# Patient Record
Sex: FEMALE | Race: ASIAN | NOT HISPANIC OR LATINO | Employment: OTHER | URBAN - METROPOLITAN AREA
[De-identification: names, ages, dates, MRNs, and addresses within clinical notes are randomized per-mention and may not be internally consistent; named-entity substitution may affect disease eponyms.]

---

## 2018-10-23 ENCOUNTER — OFFICE VISIT (OUTPATIENT)
Dept: URGENT CARE | Facility: CLINIC | Age: 43
End: 2018-10-23
Payer: MEDICAID

## 2018-10-23 VITALS
HEIGHT: 63 IN | DIASTOLIC BLOOD PRESSURE: 68 MMHG | OXYGEN SATURATION: 95 % | BODY MASS INDEX: 23.92 KG/M2 | TEMPERATURE: 100.1 F | WEIGHT: 135 LBS | HEART RATE: 107 BPM | RESPIRATION RATE: 16 BRPM | SYSTOLIC BLOOD PRESSURE: 115 MMHG

## 2018-10-23 DIAGNOSIS — N61.0 MASTITIS, LEFT, ACUTE: ICD-10-CM

## 2018-10-23 DIAGNOSIS — T78.40XA ALLERGIC REACTION TO DRUG, INITIAL ENCOUNTER: ICD-10-CM

## 2018-10-23 PROCEDURE — 99204 OFFICE O/P NEW MOD 45 MIN: CPT | Performed by: PHYSICIAN ASSISTANT

## 2018-10-23 RX ORDER — CLINDAMYCIN HYDROCHLORIDE 300 MG/1
300 CAPSULE ORAL 3 TIMES DAILY
COMMUNITY
End: 2018-11-02

## 2018-10-23 RX ORDER — CEPHALEXIN 500 MG/1
500 CAPSULE ORAL 4 TIMES DAILY
Qty: 40 CAP | Refills: 0 | Status: ON HOLD | OUTPATIENT
Start: 2018-10-23 | End: 2018-11-05

## 2018-10-23 ASSESSMENT — ENCOUNTER SYMPTOMS
HEADACHES: 0
PALPITATIONS: 0
SHORTNESS OF BREATH: 0
DIARRHEA: 1
NAUSEA: 0
SORE THROAT: 0
MYALGIAS: 0
COUGH: 0
VOMITING: 0
FEVER: 1
CHILLS: 1

## 2018-10-23 NOTE — PROGRESS NOTES
"Subjective:      Sofia Viramontes is a 43 y.o. female who presents with Medication Reaction (is taking antibiotic for mastitis and stomach flu but is giving her hives x 1 day. Lt breat inlarged and sore. )            Patient is here with complaints of left breast pain x 3 days. Patient started Clindamycin 3 days ago and has developed redness and itching. She denies any throat swelling or shortness of breath associated with the itching. She has low-grade fevers. She is also recovering from the stomach flu which she is gradually improving. She has history of Penicillin allergy and allergy to Sulfa- both of which caused hives in the past. She has no history of anaphylaxis from antibiotics.      History reviewed. No pertinent past medical history.    History reviewed. No pertinent surgical history.    History reviewed. No pertinent family history.    Allergies   Allergen Reactions   • Amoxicillin Hives   • Penicillins Hives   • Septra [Sulfamethoxazole W-Trimethoprim] Hives   • Sulfa Drugs Hives       Medications, Allergies, and current problem list reviewed today in Epic      Review of Systems   Constitutional: Positive for chills and fever. Negative for malaise/fatigue.   HENT: Negative for sore throat.    Respiratory: Negative for cough and shortness of breath.    Cardiovascular: Negative for chest pain, palpitations and leg swelling.   Gastrointestinal: Positive for diarrhea. Negative for nausea and vomiting.   Genitourinary:        Left breast swelling, redness, pain    Musculoskeletal: Negative for myalgias.   Neurological: Negative for headaches.     All other systems reviewed and are negative.          Objective:     /68   Pulse (!) 107   Temp 37.8 °C (100.1 °F)   Resp 16   Ht 1.6 m (5' 3\")   Wt 61.2 kg (135 lb)   SpO2 95%   BMI 23.91 kg/m²      Physical Exam   Constitutional: She is oriented to person, place, and time. She appears well-developed and well-nourished. No distress.   HENT:   Head: " Normocephalic and atraumatic.   Mouth/Throat: Oropharynx is clear and moist.   On oropharyngeal edema. Airway patent   Cardiovascular: Normal rate, regular rhythm and normal heart sounds.  Exam reveals no gallop and no friction rub.    No murmur heard.  Pulmonary/Chest: Effort normal and breath sounds normal. No respiratory distress. She has no wheezes. She has no rales. She exhibits tenderness, edema and swelling. She exhibits no crepitus. Left breast exhibits tenderness. Left breast exhibits no mass, no nipple discharge and no skin change.       Neurological: She is alert and oriented to person, place, and time. No cranial nerve deficit.   Skin: Rash (diffuse erythematous macular rash.) noted.   Psychiatric: She has a normal mood and affect. Her behavior is normal. Judgment and thought content normal.               Assessment/Plan:     1. Mastitis, left, acute  cephALEXin (KEFLEX) 500 MG Cap   2. Allergic reaction to drug, initial encounter         - cephALEXin (KEFLEX) 500 MG Cap; Take 1 Cap by mouth 4 times a day for 10 days.  Dispense: 40 Cap; Refill: 0    Discussed small risk for cross-reactivity reaction associated with Keflex- patient has history of Penicillin allergy. Unfortunately, we have no other options at this time.  Patient willing to try Keflex. Encouraged probiotic use.  WE did discuss Benadryl  Only prn. Limit use to avoid decreased milk supply and CNS depression in toddler.      Differential diagnoses, Supportive care, and indications for immediate follow-up discussed with patient.   Instructed to return to clinic or nearest emergency department for any change in condition, further concerns, or worsening of symptoms.    The patient demonstrated a good understanding and agreed with the treatment plan.    Hien Gonzalez P.A.-C.

## 2018-10-24 ENCOUNTER — OFFICE VISIT (OUTPATIENT)
Dept: INTERNAL MEDICINE | Facility: MEDICAL CENTER | Age: 43
End: 2018-10-24
Payer: MEDICAID

## 2018-10-24 VITALS
HEIGHT: 63 IN | SYSTOLIC BLOOD PRESSURE: 103 MMHG | WEIGHT: 135 LBS | BODY MASS INDEX: 23.92 KG/M2 | HEART RATE: 103 BPM | OXYGEN SATURATION: 95 % | TEMPERATURE: 100 F | DIASTOLIC BLOOD PRESSURE: 71 MMHG

## 2018-10-24 DIAGNOSIS — A08.4 STOMACH FLU: ICD-10-CM

## 2018-10-24 DIAGNOSIS — Z00.00 HEALTHCARE MAINTENANCE: ICD-10-CM

## 2018-10-24 DIAGNOSIS — T36.8X5D: ICD-10-CM

## 2018-10-24 DIAGNOSIS — N61.0 MASTITIS: ICD-10-CM

## 2018-10-24 PROCEDURE — 99204 OFFICE O/P NEW MOD 45 MIN: CPT | Performed by: INTERNAL MEDICINE

## 2018-10-24 RX ORDER — CLINDAMYCIN HYDROCHLORIDE 300 MG/1
300 CAPSULE ORAL 3 TIMES DAILY
COMMUNITY
End: 2018-10-25

## 2018-10-24 ASSESSMENT — PATIENT HEALTH QUESTIONNAIRE - PHQ9: CLINICAL INTERPRETATION OF PHQ2 SCORE: 0

## 2018-10-24 ASSESSMENT — PAIN SCALES - GENERAL: PAINLEVEL: 3=SLIGHT PAIN

## 2018-10-24 NOTE — PROGRESS NOTES
Sofia Graham is a 43 y.o. female who is here to Establish care and is new to the clinic.     CC: Left breast swelling and tenderness, abdominal complaints    HPI:    Patient is a 43-year-old female with no significant past medical history who is here to establish care today.  Patient does have a few complaints today.  First, as her complaint of left breast swelling.  She said it all started on Saturday when her left breast started getting swollen as well as erythematous.  She said that there is also a lot of redness as well as tenderness.  She was having a blocked milk duct at that time and she thinks that is what probably precipitated this.  That night, she says she was awake all night with complaints of nausea and vomiting.  She started taking antibiotics on Sunday.  She started taking clindamycin.  She says she got a lot of itching from the antibiotic.  She is also allergic to a few other antibiotics including penicillins and sulfa drugs.  Patient said that she then started getting a cough, a little bit of a sore throat, abdominal discomfort, and diarrhea.  She does not complain of any blood in her stool or her vomitus.  She is also complaining of vague symptoms of having numbness in her hands.  She did start taking Tylenol, probiotics, naman drinks, as well as Imodium.  She said the Imodium helped a little bit with her symptoms as well.  She then went to urgent care and was given a prescription for Keflex which she has not started taking yet.  She is breast-feeding her daughter but she says her milk supply has diminished and there is really nothing that is coming out of her left breast so she is not feeding well from that breast any longer.  She has not been around anybody who has been sick and has not been out of the city.  She is also complaining of having fever and chills.    She is also complaining of pain behind her head on the right.  She says this occurs on and off but has decreased in  "frequency recently.  We discussed possibly talking about this at the next visit.    She does not have any other past medical history and her surgical history is only significant for .  She does not smoke tobacco, denies any drug use, denied any alcohol use.  She said she had a Pap smear back in 2016 which was normal as well as a mammogram in spring 2016 which was normal.  Her routine labs were done about 5-6 years ago so she would be interested in getting those done again.    No problem-specific Assessment & Plan notes found for this encounter.      She  has no past medical history on file.    There are no active problems to display for this patient.      Allergies:Amoxicillin; Pcn [penicillins]; and Septra [sulfamethoxazole w-trimethoprim]    No current outpatient prescriptions on file.     No current facility-administered medications for this visit.        Social History   Substance Use Topics   • Smoking status: Never Smoker   • Smokeless tobacco: Never Used   • Alcohol use No       Family History   Problem Relation Age of Onset   • Hypertension Mother    • Arthritis Father      Review of Systems:   Pertinent positives as stated in HPI, all others reviewed as negative.    Physical Exam:  Blood pressure 103/71, pulse (!) 103, temperature 37.8 °C (100 °F), temperature source Temporal, height 1.6 m (5' 3\"), weight 61.2 kg (135 lb), SpO2 95 %. Body mass index is 23.91 kg/m².    General Appearance: healthy, alert, no distress, cooperative  Skin: No rashes or lesions.  Head: Normocephalic. No masses appreciated.   Eyes: PERRLA.  Ears: External ears normal.  Nose/Sinuses: Nares normal. Mucosa normal.  Oropharynx:  Oropharynx moist and without lesion. Mild erythema noted.   Neck: Neck supple. No adenopathy.   Lungs: Lungs clear to auscultation bilaterally.  Heart: Tachycardic without murmur, gallop, or rubs.  Breast: Left breast is engorged, erythematous, tender to palpation and warm to touch. No " fluctuant mass felt on examination. No discharge noted. No skin changes noted.   Abdomen: Soft, BS normal. Mild tenderness to palpation in the mid epigastric region. No rebound tenderness, no guarding noted.    Musculoskeletal: Extremities: Upper and lower extremities appear normal. No deformities, edema.   Peripheral Pulses: Pulses: radial=4/4, dorsalis pedis=4/4  Psychiatric: Mood appears normal.     Assessment/Plan:     1. Mastitis  With engorgement of the left breast, tenderness to palpation, erythema, and fever, we will treat this as mastitis.  She was placed on clindamycin earlier but had a reaction to the medication and is now been placed on Keflex.  I recommended for her to start taking the Keflex.  On examination, there is no sensation of an abscess but that is something to consider if her symptoms do not improve.  She does not have any risk factors to cover for MRSA at this point.  Recommended to start taking the Keflex and to call the office in 3 days if there is no improvement in her symptoms.  She has tried warm compresses but they have not helped.  Another thing to consider if her symptoms do not resolve is getting a culture of fluid from her breast and sending it for analysis.  Another thing to worry about if her symptoms do not improve is also breast abscess for which she may need an ultrasound.  We will see how she does and determine further steps from there onwards.  - CBC WITH DIFFERENTIAL; Future    2. Stomach flu  She had symptoms of nausea, vomiting, abdominal pain, and diarrhea before she started the clindamycin.  She may be having a concurrent systemic infection as well which is likely viral in nature.  No signs of concern at this point in relation to that but certainly if her symptoms do not improve, she will need further workup.  I did order basic labs for her to get done.  Recommended to maintain hydration, can take Tylenol for pain.  She has been taking Imodium for the diarrhea.  - CBC  WITH DIFFERENTIAL; Future  - COMP METABOLIC PANEL; Future    3. Healthcare maintenance  Will order routine labs for her.  Pap smear was done in 2016.  Mammogram was also in spring 2016.  I advised her to come back for another visit after this has resolved so that we can talk in more detail about that as well as her other complaints.  - CBC WITH DIFFERENTIAL; Future  - COMP METABOLIC PANEL; Future  - LIPID PROFILE; Future    4. Adverse effect of clindamycin, subsequent encounter  Patient did have intense itching with the clindamycin so she did stop taking that medication.  She does have allergies to penicillin and sulfa drugs.  We will continue to monitor this for now.  No other needs at this time.      Followup: Return in about 4 weeks (around 11/21/2018), or if symptoms worsen or fail to improve.    This note was created using voice recognition software. There may be unintended errors spelling, and grammar or content.

## 2018-10-26 ENCOUNTER — HOSPITAL ENCOUNTER (OUTPATIENT)
Dept: LAB | Facility: MEDICAL CENTER | Age: 43
End: 2018-10-26
Attending: INTERNAL MEDICINE
Payer: MEDICAID

## 2018-10-26 LAB
ALBUMIN SERPL BCP-MCNC: 3.3 G/DL (ref 3.2–4.9)
ALBUMIN/GLOB SERPL: 1 G/DL
ALP SERPL-CCNC: 63 U/L (ref 30–99)
ALT SERPL-CCNC: 16 U/L (ref 2–50)
ANION GAP SERPL CALC-SCNC: 7 MMOL/L (ref 0–11.9)
AST SERPL-CCNC: 11 U/L (ref 12–45)
BASOPHILS # BLD AUTO: 0 % (ref 0–1.8)
BASOPHILS # BLD: 0 K/UL (ref 0–0.12)
BILIRUB SERPL-MCNC: 0.6 MG/DL (ref 0.1–1.5)
BUN SERPL-MCNC: 13 MG/DL (ref 8–22)
CALCIUM SERPL-MCNC: 8.9 MG/DL (ref 8.5–10.5)
CHLORIDE SERPL-SCNC: 102 MMOL/L (ref 96–112)
CHOLEST SERPL-MCNC: 116 MG/DL (ref 100–199)
CO2 SERPL-SCNC: 27 MMOL/L (ref 20–33)
CREAT SERPL-MCNC: 0.76 MG/DL (ref 0.5–1.4)
EOSINOPHIL # BLD AUTO: 0.51 K/UL (ref 0–0.51)
EOSINOPHIL NFR BLD: 7.2 % (ref 0–6.9)
ERYTHROCYTE [DISTWIDTH] IN BLOOD BY AUTOMATED COUNT: 42.7 FL (ref 35.9–50)
FASTING STATUS PATIENT QL REPORTED: NORMAL
GLOBULIN SER CALC-MCNC: 3.2 G/DL (ref 1.9–3.5)
GLUCOSE SERPL-MCNC: 98 MG/DL (ref 65–99)
HCT VFR BLD AUTO: 36.4 % (ref 37–47)
HDLC SERPL-MCNC: 24 MG/DL
HGB BLD-MCNC: 12.1 G/DL (ref 12–16)
LDLC SERPL CALC-MCNC: 59 MG/DL
LYMPHOCYTES # BLD AUTO: 2.24 K/UL (ref 1–4.8)
LYMPHOCYTES NFR BLD: 31.5 % (ref 22–41)
MANUAL DIFF BLD: NORMAL
MCH RBC QN AUTO: 29.6 PG (ref 27–33)
MCHC RBC AUTO-ENTMCNC: 33.2 G/DL (ref 33.6–35)
MCV RBC AUTO: 89 FL (ref 81.4–97.8)
MONOCYTES # BLD AUTO: 0.89 K/UL (ref 0–0.85)
MONOCYTES NFR BLD AUTO: 12.6 % (ref 0–13.4)
MORPHOLOGY BLD-IMP: NORMAL
MYELOCYTES NFR BLD MANUAL: 0.9 %
NEUTROPHILS # BLD AUTO: 3.39 K/UL (ref 2–7.15)
NEUTROPHILS NFR BLD: 44.2 % (ref 44–72)
NEUTS BAND NFR BLD MANUAL: 3.6 % (ref 0–10)
NRBC # BLD AUTO: 0 K/UL
NRBC BLD-RTO: 0 /100 WBC
PLATELET # BLD AUTO: 126 K/UL (ref 164–446)
PLATELET BLD QL SMEAR: NORMAL
PMV BLD AUTO: 11.2 FL (ref 9–12.9)
POTASSIUM SERPL-SCNC: 3.8 MMOL/L (ref 3.6–5.5)
PROT SERPL-MCNC: 6.5 G/DL (ref 6–8.2)
RBC # BLD AUTO: 4.09 M/UL (ref 4.2–5.4)
RBC BLD AUTO: PRESENT
SODIUM SERPL-SCNC: 136 MMOL/L (ref 135–145)
TOXIC GRANULES BLD QL SMEAR: SLIGHT
TRIGL SERPL-MCNC: 165 MG/DL (ref 0–149)
VARIANT LYMPHS BLD QL SMEAR: NORMAL
WBC # BLD AUTO: 7.1 K/UL (ref 4.8–10.8)

## 2018-10-26 PROCEDURE — 85007 BL SMEAR W/DIFF WBC COUNT: CPT

## 2018-10-26 PROCEDURE — 36415 COLL VENOUS BLD VENIPUNCTURE: CPT

## 2018-10-26 PROCEDURE — 80053 COMPREHEN METABOLIC PANEL: CPT

## 2018-10-26 PROCEDURE — 80061 LIPID PANEL: CPT

## 2018-10-26 PROCEDURE — 85027 COMPLETE CBC AUTOMATED: CPT

## 2018-10-29 ENCOUNTER — OFFICE VISIT (OUTPATIENT)
Dept: MEDICAL GROUP | Facility: MEDICAL CENTER | Age: 43
End: 2018-10-29
Attending: NURSE PRACTITIONER
Payer: MEDICAID

## 2018-10-29 VITALS
SYSTOLIC BLOOD PRESSURE: 90 MMHG | WEIGHT: 134 LBS | DIASTOLIC BLOOD PRESSURE: 60 MMHG | TEMPERATURE: 98.3 F | HEART RATE: 78 BPM | RESPIRATION RATE: 16 BRPM | OXYGEN SATURATION: 100 % | HEIGHT: 63 IN | BODY MASS INDEX: 23.74 KG/M2

## 2018-10-29 DIAGNOSIS — N61.0 MASTITIS: ICD-10-CM

## 2018-10-29 DIAGNOSIS — N64.4 BREAST PAIN, LEFT: ICD-10-CM

## 2018-10-29 PROCEDURE — 99212 OFFICE O/P EST SF 10 MIN: CPT | Performed by: NURSE PRACTITIONER

## 2018-10-29 PROCEDURE — 99213 OFFICE O/P EST LOW 20 MIN: CPT | Performed by: NURSE PRACTITIONER

## 2018-10-29 RX ORDER — DOXYCYCLINE 100 MG/1
100 CAPSULE ORAL 2 TIMES DAILY
Qty: 14 CAP | Refills: 0 | Status: SHIPPED | OUTPATIENT
Start: 2018-10-29 | End: 2018-11-13

## 2018-10-29 RX ORDER — CLINDAMYCIN HYDROCHLORIDE 300 MG/1
CAPSULE ORAL
Refills: 0 | COMMUNITY
Start: 2018-10-21 | End: 2018-10-29

## 2018-10-29 RX ORDER — IBUPROFEN 800 MG/1
400 TABLET ORAL EVERY 8 HOURS PRN
Qty: 30 TAB | Refills: 0 | Status: SHIPPED | OUTPATIENT
Start: 2018-10-29 | End: 2018-12-19

## 2018-10-29 RX ORDER — CEPHALEXIN 500 MG/1
CAPSULE ORAL
Refills: 0 | COMMUNITY
Start: 2018-10-23 | End: 2018-11-13

## 2018-10-29 ASSESSMENT — PAIN SCALES - GENERAL: PAINLEVEL: 2=MINIMAL-SLIGHT

## 2018-10-29 ASSESSMENT — PATIENT HEALTH QUESTIONNAIRE - PHQ9: CLINICAL INTERPRETATION OF PHQ2 SCORE: 0

## 2018-10-29 NOTE — ASSESSMENT & PLAN NOTE
Patient reports is red and less swollen and unable to express milk from Left breast. States was to get checked

## 2018-10-29 NOTE — PROGRESS NOTES
"Chief Complaint:   Chief Complaint   Patient presents with   • Breast Problem     mastitis, left        HPI:  Sofia is here today for short appt for \"check up\"    She was just seen as a new patient by [facility] on 10/24/18.  Apparently they have no open appt's to see her today at [facility].    PMH    Allergies to Penicillin, Sulfa, probably Clindamycin.  Breast Feeding     Nev  Report:   No entries    Review of Records:  10/24/18 New Patient appt w [facility], Dr. Vizcarra.  Chief concern was left breast swelling and tenderness, abdominal complaints.  Numbness in hands, sore throat, breastfeeding w no milk flow from left breast.  Started Antibiotic, Clindamycin on 10/21/18 reportedly for probable Mastitis but   Became itchy.  In addition had right head pain.  PMH included , Pap Smear Dec 2016 reportedly normal and mammogram  also normal.  Allergies to Penicillin, Septra, and now Clindamycin---> Dx w Stomach flu, RXN to Clindamycin, Breast Engorgement,  Probable Mastitis and started on Keflex anf f/u by phone in 3 days and labs ordered and office visit in 4 wks. CBC, CMP, Lipid Profile ordered by [facility].      Breast pain, left  Patient reports is red and less swollen and unable to express milk from Left breast. States was to get checked  As above, recently started on Keflex after issue of Itching 'all over' w Clindamycin.  Unable to obtain fluid or breast milk as left breast no milk flow.  Sofia reports less redness to surface of left breast and left swelling and tenderness but still some discomfort  And redness to lower aspect of anterior breast.   Here as [facility] wanted her to have f/u check but no [facility] MD available today.  Pt requesting U/s of breast to r/o abscess. Denies fever or chills. Is no longer breast feeding.   Current antibiotic covers staph but not MRSA although unlikely is possible.  Will have her continue Keflex, add Doxycycline and order u/s per patient request.  She is to call for " "results ( or My Chart and given access info) and f/u w [facility] as discussed.    Patient Active Problem List    Diagnosis Date Noted   • Breast pain, left 10/29/2018       Allergies:Patient has no allergy information on record.    Medicines as of today:  Current Outpatient Prescriptions   Medication Sig Dispense Refill   • cephALEXin (KEFLEX) 500 MG Cap TAKE 1 C PO QID FOR 10 DAYS  0   • ibuprofen (MOTRIN) 800 MG Tab Take 0.5 Tabs by mouth every 8 hours as needed. 30 Tab 0   • doxycycline (MONODOX) 100 MG capsule Take 1 Cap by mouth 2 times a day. 14 Cap 0     No current facility-administered medications for this visit.        Social History   Substance Use Topics   • Smoking status: Never Smoker   • Smokeless tobacco: Never Used   • Alcohol use No       No past medical history on file.    No family history on file.    ROS:  Review of Systems   See HPI Above    Exam:  Blood pressure (!) 90/60, pulse 78, temperature 36.8 °C (98.3 °F), temperature source Temporal, resp. rate 16, height 1.6 m (5' 3\"), weight 60.8 kg (134 lb), last menstrual period 10/23/2018, SpO2 100 %. Body mass index is 23.74 kg/m².    General:  Well nourished, well developed female in NAD  HENT:Head is grossly normal.  Neck: Supple. Trachea is midline.  Pulmonary: Speaks in full sentences easily.  Normal effort. No    Cardiovascular: Regular rate and rhythm.  Chest- Left breast redness to bottom half of anterior breast.  Abdomen-Abdomen is soft  Upper extremities- MAEW  Lower extremities- neg for edema  Neuro- A & O x 4. Speech clear and appropriate.    Current medications, allergies, and problem list reviewed with patient and updated in [facility] today.    Assessment/Plan:  1. Breast pain, left  US-BREAST COMPLETE-LEFT    ibuprofen (MOTRIN) 800 MG Tab   2. Mastitis  US-BREAST COMPLETE-LEFT    ibuprofen (MOTRIN) 800 MG Tab    doxycycline (MONODOX) 100 MG capsule   Call for Results  F/u [facility] PCP as discussed    SEE her PCP in about 3 weeks (around 11/19/2018) " for w [facility] PCP, sooner if worse..

## 2018-10-30 ENCOUNTER — TELEPHONE (OUTPATIENT)
Dept: INTERNAL MEDICINE | Facility: MEDICAL CENTER | Age: 43
End: 2018-10-30

## 2018-10-30 DIAGNOSIS — N61.0 MASTITIS, LEFT, ACUTE: ICD-10-CM

## 2018-10-30 NOTE — PROGRESS NOTES
Please call patient and inform her of the breast ultrasound ordered. The ultrasound is ordered to be urgent and can you schedule the appointment as soon as possible for the patient. Thanks.

## 2018-10-30 NOTE — TELEPHONE ENCOUNTER
I called [facility] [employer] and was able to get patient scheduled for 10/30/18 with check in time at 2pm.  She will be seen at the [address] [place] [address]. I gave tech Dr. Vizcarra personal number so they can call her and give her the STAT results.     Called patient and notified.  She stated she will be there.      I also [facility] and cancelled her appointment for Nov. 1st.

## 2018-10-30 NOTE — TELEPHONE ENCOUNTER
Called [facility] and spoke to Wesley to set an US of breast STAT for pt. An appt was set up for Nov 1 at [facility].    Called pt to inform her that the appointment was set up for 9:00am check-in time for a 9:30am appointment at [facility] [place] [address]. Pt agreed with appt time and I let her know that if she needed to change, she can call [phone] to change appt.

## 2018-10-31 NOTE — TELEPHONE ENCOUNTER
Patient called and stated she had her ultrasound done and they told her there was no abscess, nut it was an infection and they recommend her switching anti biotics.  She also wanted to let you know that her pain is getting worse.  Her pain level is at 6 and she has been having fever and chills again.  She wants to know if we have any suggestions for her.     Please advise.

## 2018-11-01 ENCOUNTER — HOSPITAL ENCOUNTER (OUTPATIENT)
Dept: LAB | Facility: MEDICAL CENTER | Age: 43
End: 2018-11-01
Attending: INTERNAL MEDICINE
Payer: MEDICAID

## 2018-11-01 LAB
BASOPHILS # BLD AUTO: 0.5 % (ref 0–1.8)
BASOPHILS # BLD: 0.08 K/UL (ref 0–0.12)
EOSINOPHIL # BLD AUTO: 0.35 K/UL (ref 0–0.51)
EOSINOPHIL NFR BLD: 2.2 % (ref 0–6.9)
ERYTHROCYTE [DISTWIDTH] IN BLOOD BY AUTOMATED COUNT: 43.8 FL (ref 35.9–50)
HCT VFR BLD AUTO: 35.2 % (ref 37–47)
HGB BLD-MCNC: 11.8 G/DL (ref 12–16)
IMM GRANULOCYTES # BLD AUTO: 0.08 K/UL (ref 0–0.11)
IMM GRANULOCYTES NFR BLD AUTO: 0.5 % (ref 0–0.9)
LYMPHOCYTES # BLD AUTO: 2.67 K/UL (ref 1–4.8)
LYMPHOCYTES NFR BLD: 16.9 % (ref 22–41)
MCH RBC QN AUTO: 31.1 PG (ref 27–33)
MCHC RBC AUTO-ENTMCNC: 33.5 G/DL (ref 33.6–35)
MCV RBC AUTO: 92.6 FL (ref 81.4–97.8)
MONOCYTES # BLD AUTO: 1.3 K/UL (ref 0–0.85)
MONOCYTES NFR BLD AUTO: 8.2 % (ref 0–13.4)
NEUTROPHILS # BLD AUTO: 11.34 K/UL (ref 2–7.15)
NEUTROPHILS NFR BLD: 71.7 % (ref 44–72)
NRBC # BLD AUTO: 0 K/UL
NRBC BLD-RTO: 0 /100 WBC
PLATELET # BLD AUTO: 481 K/UL (ref 164–446)
PMV BLD AUTO: 9.5 FL (ref 9–12.9)
RBC # BLD AUTO: 3.8 M/UL (ref 4.2–5.4)
WBC # BLD AUTO: 15.8 K/UL (ref 4.8–10.8)

## 2018-11-01 PROCEDURE — 36415 COLL VENOUS BLD VENIPUNCTURE: CPT

## 2018-11-01 PROCEDURE — 85025 COMPLETE CBC W/AUTO DIFF WBC: CPT

## 2018-11-01 NOTE — TELEPHONE ENCOUNTER
Patient left a voicemail stating she had an appointment with OBGYN tomorrow morning and she did blood work but she did not know that she needed to be fasting so she will do them tomorrow afternoon.      I called patient back and notified that I would let Dr. Vizcarra know.

## 2018-11-01 NOTE — TELEPHONE ENCOUNTER
Called patient three times without a response.    Called her brother-in-law, Gucci Meyers, and left a message to return call. He returned the call. Advised him that I have not been able to get in touch with the patient. Updated on the results of the Ultrasound. Recommended that patient may need further work up and IV antibiotics because yesterday she stated she was still have a fever, fatigue, and the symptoms of mastitis were the same. He states patient has an appointment with Ob/Gyn tomorrow and that she is doing okay.

## 2018-11-02 ENCOUNTER — HOSPITAL ENCOUNTER (INPATIENT)
Facility: MEDICAL CENTER | Age: 43
LOS: 4 days | DRG: 600 | End: 2018-11-06
Attending: EMERGENCY MEDICINE | Admitting: INTERNAL MEDICINE
Payer: MEDICAID

## 2018-11-02 ENCOUNTER — APPOINTMENT (OUTPATIENT)
Dept: RADIOLOGY | Facility: MEDICAL CENTER | Age: 43
DRG: 600 | End: 2018-11-02
Attending: STUDENT IN AN ORGANIZED HEALTH CARE EDUCATION/TRAINING PROGRAM
Payer: MEDICAID

## 2018-11-02 ENCOUNTER — APPOINTMENT (OUTPATIENT)
Dept: RADIOLOGY | Facility: MEDICAL CENTER | Age: 43
DRG: 600 | End: 2018-11-02
Attending: EMERGENCY MEDICINE
Payer: MEDICAID

## 2018-11-02 PROBLEM — D64.9 NORMOCYTIC ANEMIA: Status: ACTIVE | Noted: 2018-11-02

## 2018-11-02 PROBLEM — D75.839 THROMBOCYTOSIS: Status: ACTIVE | Noted: 2018-11-02

## 2018-11-02 PROBLEM — N61.0 ACUTE MASTITIS OF LEFT BREAST: Status: ACTIVE | Noted: 2018-11-02

## 2018-11-02 PROBLEM — R07.9 CHEST PAIN: Status: ACTIVE | Noted: 2018-11-02

## 2018-11-02 LAB
ALBUMIN SERPL BCP-MCNC: 3.8 G/DL (ref 3.2–4.9)
ALBUMIN/GLOB SERPL: 1 G/DL
ALP SERPL-CCNC: 59 U/L (ref 30–99)
ALT SERPL-CCNC: 11 U/L (ref 2–50)
ANION GAP SERPL CALC-SCNC: 7 MMOL/L (ref 0–11.9)
AST SERPL-CCNC: 14 U/L (ref 12–45)
BASOPHILS # BLD AUTO: 1.2 % (ref 0–1.8)
BASOPHILS # BLD: 0.11 K/UL (ref 0–0.12)
BILIRUB SERPL-MCNC: 0.4 MG/DL (ref 0.1–1.5)
BUN SERPL-MCNC: 14 MG/DL (ref 8–22)
CALCIUM SERPL-MCNC: 9 MG/DL (ref 8.5–10.5)
CHLORIDE SERPL-SCNC: 106 MMOL/L (ref 96–112)
CO2 SERPL-SCNC: 26 MMOL/L (ref 20–33)
CREAT SERPL-MCNC: 0.74 MG/DL (ref 0.5–1.4)
EKG IMPRESSION: NORMAL
EKG IMPRESSION: NORMAL
EOSINOPHIL # BLD AUTO: 0.29 K/UL (ref 0–0.51)
EOSINOPHIL NFR BLD: 3.1 % (ref 0–6.9)
ERYTHROCYTE [DISTWIDTH] IN BLOOD BY AUTOMATED COUNT: 44.2 FL (ref 35.9–50)
GLOBULIN SER CALC-MCNC: 3.7 G/DL (ref 1.9–3.5)
GLUCOSE SERPL-MCNC: 90 MG/DL (ref 65–99)
HCG SERPL QL: NEGATIVE
HCT VFR BLD AUTO: 36.4 % (ref 37–47)
HGB BLD-MCNC: 11.8 G/DL (ref 12–16)
IMM GRANULOCYTES # BLD AUTO: 0.03 K/UL (ref 0–0.11)
IMM GRANULOCYTES NFR BLD AUTO: 0.3 % (ref 0–0.9)
IRON SATN MFR SERPL: 15 % (ref 15–55)
IRON SERPL-MCNC: 47 UG/DL (ref 40–170)
LDH SERPL L TO P-CCNC: 136 U/L (ref 107–266)
LYMPHOCYTES # BLD AUTO: 2.6 K/UL (ref 1–4.8)
LYMPHOCYTES NFR BLD: 28 % (ref 22–41)
MAGNESIUM SERPL-MCNC: 2.2 MG/DL (ref 1.5–2.5)
MCH RBC QN AUTO: 30.3 PG (ref 27–33)
MCHC RBC AUTO-ENTMCNC: 32.4 G/DL (ref 33.6–35)
MCV RBC AUTO: 93.3 FL (ref 81.4–97.8)
MONOCYTES # BLD AUTO: 0.57 K/UL (ref 0–0.85)
MONOCYTES NFR BLD AUTO: 6.1 % (ref 0–13.4)
NEUTROPHILS # BLD AUTO: 5.7 K/UL (ref 2–7.15)
NEUTROPHILS NFR BLD: 61.3 % (ref 44–72)
NRBC # BLD AUTO: 0 K/UL
NRBC BLD-RTO: 0 /100 WBC
PHOSPHATE SERPL-MCNC: 3.6 MG/DL (ref 2.5–4.5)
PLATELET # BLD AUTO: 546 K/UL (ref 164–446)
PMV BLD AUTO: 8.9 FL (ref 9–12.9)
POTASSIUM SERPL-SCNC: 3.9 MMOL/L (ref 3.6–5.5)
PROT SERPL-MCNC: 7.5 G/DL (ref 6–8.2)
RBC # BLD AUTO: 3.9 M/UL (ref 4.2–5.4)
SODIUM SERPL-SCNC: 139 MMOL/L (ref 135–145)
TIBC SERPL-MCNC: 322 UG/DL (ref 250–450)
TROPONIN I SERPL-MCNC: <0.01 NG/ML (ref 0–0.04)
WBC # BLD AUTO: 9.3 K/UL (ref 4.8–10.8)

## 2018-11-02 PROCEDURE — 93005 ELECTROCARDIOGRAM TRACING: CPT | Performed by: STUDENT IN AN ORGANIZED HEALTH CARE EDUCATION/TRAINING PROGRAM

## 2018-11-02 PROCEDURE — 700102 HCHG RX REV CODE 250 W/ 637 OVERRIDE(OP): Performed by: STUDENT IN AN ORGANIZED HEALTH CARE EDUCATION/TRAINING PROGRAM

## 2018-11-02 PROCEDURE — 700117 HCHG RX CONTRAST REV CODE 255: Performed by: STUDENT IN AN ORGANIZED HEALTH CARE EDUCATION/TRAINING PROGRAM

## 2018-11-02 PROCEDURE — 96375 TX/PRO/DX INJ NEW DRUG ADDON: CPT

## 2018-11-02 PROCEDURE — 83735 ASSAY OF MAGNESIUM: CPT

## 2018-11-02 PROCEDURE — 700111 HCHG RX REV CODE 636 W/ 250 OVERRIDE (IP): Performed by: EMERGENCY MEDICINE

## 2018-11-02 PROCEDURE — 700105 HCHG RX REV CODE 258: Performed by: EMERGENCY MEDICINE

## 2018-11-02 PROCEDURE — 84703 CHORIONIC GONADOTROPIN ASSAY: CPT

## 2018-11-02 PROCEDURE — 84484 ASSAY OF TROPONIN QUANT: CPT

## 2018-11-02 PROCEDURE — 93010 ELECTROCARDIOGRAM REPORT: CPT | Mod: 77 | Performed by: INTERNAL MEDICINE

## 2018-11-02 PROCEDURE — 83540 ASSAY OF IRON: CPT

## 2018-11-02 PROCEDURE — 93005 ELECTROCARDIOGRAM TRACING: CPT | Performed by: EMERGENCY MEDICINE

## 2018-11-02 PROCEDURE — 93010 ELECTROCARDIOGRAM REPORT: CPT | Performed by: INTERNAL MEDICINE

## 2018-11-02 PROCEDURE — A9270 NON-COVERED ITEM OR SERVICE: HCPCS | Performed by: STUDENT IN AN ORGANIZED HEALTH CARE EDUCATION/TRAINING PROGRAM

## 2018-11-02 PROCEDURE — 83550 IRON BINDING TEST: CPT

## 2018-11-02 PROCEDURE — 96365 THER/PROPH/DIAG IV INF INIT: CPT

## 2018-11-02 PROCEDURE — 700105 HCHG RX REV CODE 258: Performed by: INTERNAL MEDICINE

## 2018-11-02 PROCEDURE — 85025 COMPLETE CBC W/AUTO DIFF WBC: CPT

## 2018-11-02 PROCEDURE — 71260 CT THORAX DX C+: CPT

## 2018-11-02 PROCEDURE — 770020 HCHG ROOM/CARE - TELE (206)

## 2018-11-02 PROCEDURE — 83615 LACTATE (LD) (LDH) ENZYME: CPT

## 2018-11-02 PROCEDURE — 99222 1ST HOSP IP/OBS MODERATE 55: CPT | Mod: GC | Performed by: INTERNAL MEDICINE

## 2018-11-02 PROCEDURE — 80053 COMPREHEN METABOLIC PANEL: CPT

## 2018-11-02 PROCEDURE — 99285 EMERGENCY DEPT VISIT HI MDM: CPT

## 2018-11-02 PROCEDURE — 96366 THER/PROPH/DIAG IV INF ADDON: CPT

## 2018-11-02 PROCEDURE — 76604 US EXAM CHEST: CPT

## 2018-11-02 PROCEDURE — 700105 HCHG RX REV CODE 258: Performed by: STUDENT IN AN ORGANIZED HEALTH CARE EDUCATION/TRAINING PROGRAM

## 2018-11-02 PROCEDURE — 84100 ASSAY OF PHOSPHORUS: CPT

## 2018-11-02 RX ORDER — IBUPROFEN 600 MG/1
600 TABLET ORAL EVERY 6 HOURS PRN
Status: DISCONTINUED | OUTPATIENT
Start: 2018-11-02 | End: 2018-11-06 | Stop reason: HOSPADM

## 2018-11-02 RX ORDER — OXYCODONE HYDROCHLORIDE 5 MG/1
5 TABLET ORAL EVERY 6 HOURS PRN
Status: DISCONTINUED | OUTPATIENT
Start: 2018-11-02 | End: 2018-11-06 | Stop reason: HOSPADM

## 2018-11-02 RX ORDER — AMOXICILLIN 250 MG
2 CAPSULE ORAL 2 TIMES DAILY
Status: DISCONTINUED | OUTPATIENT
Start: 2018-11-02 | End: 2018-11-06 | Stop reason: HOSPADM

## 2018-11-02 RX ORDER — DIPHENHYDRAMINE HYDROCHLORIDE 50 MG/ML
25 INJECTION INTRAMUSCULAR; INTRAVENOUS ONCE
Status: COMPLETED | OUTPATIENT
Start: 2018-11-02 | End: 2018-11-02

## 2018-11-02 RX ORDER — ACETAMINOPHEN 325 MG/1
650 TABLET ORAL EVERY 6 HOURS PRN
Status: DISCONTINUED | OUTPATIENT
Start: 2018-11-02 | End: 2018-11-06 | Stop reason: HOSPADM

## 2018-11-02 RX ORDER — IBUPROFEN 200 MG
600 TABLET ORAL 2 TIMES DAILY PRN
COMMUNITY
End: 2018-12-19

## 2018-11-02 RX ORDER — BISACODYL 10 MG
10 SUPPOSITORY, RECTAL RECTAL
Status: DISCONTINUED | OUTPATIENT
Start: 2018-11-02 | End: 2018-11-06 | Stop reason: HOSPADM

## 2018-11-02 RX ORDER — DIPHENHYDRAMINE HCL 25 MG
25 TABLET ORAL EVERY 8 HOURS PRN
Status: DISCONTINUED | OUTPATIENT
Start: 2018-11-02 | End: 2018-11-06 | Stop reason: HOSPADM

## 2018-11-02 RX ORDER — POLYETHYLENE GLYCOL 3350 17 G/17G
1 POWDER, FOR SOLUTION ORAL
Status: DISCONTINUED | OUTPATIENT
Start: 2018-11-02 | End: 2018-11-06 | Stop reason: HOSPADM

## 2018-11-02 RX ORDER — SODIUM CHLORIDE 9 MG/ML
INJECTION, SOLUTION INTRAVENOUS CONTINUOUS
Status: DISCONTINUED | OUTPATIENT
Start: 2018-11-02 | End: 2018-11-06 | Stop reason: HOSPADM

## 2018-11-02 RX ORDER — ACETAMINOPHEN 500 MG
1000 TABLET ORAL 2 TIMES DAILY PRN
COMMUNITY
End: 2022-05-13

## 2018-11-02 RX ORDER — SODIUM CHLORIDE 9 MG/ML
2000 INJECTION, SOLUTION INTRAVENOUS ONCE
Status: COMPLETED | OUTPATIENT
Start: 2018-11-02 | End: 2018-11-02

## 2018-11-02 RX ADMIN — IOHEXOL 80 ML: 350 INJECTION, SOLUTION INTRAVENOUS at 21:56

## 2018-11-02 RX ADMIN — VANCOMYCIN HYDROCHLORIDE 1500 MG: 100 INJECTION, POWDER, LYOPHILIZED, FOR SOLUTION INTRAVENOUS at 15:20

## 2018-11-02 RX ADMIN — SODIUM CHLORIDE: 9 INJECTION, SOLUTION INTRAVENOUS at 19:01

## 2018-11-02 RX ADMIN — SODIUM CHLORIDE 2000 ML: 9 INJECTION, SOLUTION INTRAVENOUS at 23:37

## 2018-11-02 RX ADMIN — DIPHENHYDRAMINE HYDROCHLORIDE 25 MG: 50 INJECTION, SOLUTION INTRAMUSCULAR; INTRAVENOUS at 15:10

## 2018-11-02 RX ADMIN — ACETAMINOPHEN 650 MG: 325 TABLET, FILM COATED ORAL at 18:06

## 2018-11-02 ASSESSMENT — COGNITIVE AND FUNCTIONAL STATUS - GENERAL
DAILY ACTIVITIY SCORE: 24
SUGGESTED CMS G CODE MODIFIER DAILY ACTIVITY: CH
SUGGESTED CMS G CODE MODIFIER MOBILITY: CH
MOBILITY SCORE: 24

## 2018-11-02 ASSESSMENT — ENCOUNTER SYMPTOMS
PALPITATIONS: 0
DIZZINESS: 0
HEMOPTYSIS: 0
SPUTUM PRODUCTION: 0
DIARRHEA: 1
ORTHOPNEA: 0
CONSTIPATION: 0
WEAKNESS: 0
CHILLS: 1
SPEECH CHANGE: 0
EYE PAIN: 0
FEVER: 1
TREMORS: 0
BLOOD IN STOOL: 0
HEADACHES: 0
WEIGHT LOSS: 0
NAUSEA: 0
COUGH: 0
MYALGIAS: 0
HEARTBURN: 0
ABDOMINAL PAIN: 0
SHORTNESS OF BREATH: 0
SENSORY CHANGE: 0
FOCAL WEAKNESS: 0
CLAUDICATION: 0
BLURRED VISION: 0
NECK PAIN: 0
VOMITING: 1
BACK PAIN: 0

## 2018-11-02 ASSESSMENT — PATIENT HEALTH QUESTIONNAIRE - PHQ9
1. LITTLE INTEREST OR PLEASURE IN DOING THINGS: NOT AT ALL
2. FEELING DOWN, DEPRESSED, IRRITABLE, OR HOPELESS: NOT AT ALL
SUM OF ALL RESPONSES TO PHQ9 QUESTIONS 1 AND 2: 0

## 2018-11-02 ASSESSMENT — COPD QUESTIONNAIRES
DURING THE PAST 4 WEEKS HOW MUCH DID YOU FEEL SHORT OF BREATH: NONE/LITTLE OF THE TIME
COPD SCREENING SCORE: 0
DO YOU EVER COUGH UP ANY MUCUS OR PHLEGM?: NO/ONLY WITH OCCASIONAL COLDS OR INFECTIONS
HAVE YOU SMOKED AT LEAST 100 CIGARETTES IN YOUR ENTIRE LIFE: NO/DON'T KNOW

## 2018-11-02 ASSESSMENT — PAIN SCALES - GENERAL
PAINLEVEL_OUTOF10: 0
PAINLEVEL_OUTOF10: 4
PAINLEVEL_OUTOF10: 4
PAINLEVEL_OUTOF10: 2

## 2018-11-02 ASSESSMENT — LIFESTYLE VARIABLES
DO YOU DRINK ALCOHOL: NO
EVER_SMOKED: NEVER

## 2018-11-02 NOTE — ED TRIAGE NOTES
Pt ambulated to triage with   Chief Complaint   Patient presents with   • Sent by MD     pt has mastitis and seen by OB, ob reports possible staph infection.     Pt was started on clindamycin and changed to keflex, pt currently taking medication as prescribed.  Pt Informed regarding triage process and verbalized understanding to inform triage tech or RN for any changes in condition. Placed in lobby.

## 2018-11-02 NOTE — SENIOR ADMIT NOTE
"[facility] SENIOR ADMIT NOTE:    Patient ID:   Name:             Sofia Viramontes     YOB: 1975  Age:                 43 y.o.  female   MRN:               [id number]                                                          Chief Complaint:       Left Breast Pain    History of Present Illness:     Mr. Viramontes is a 43 y.o. female with no pertinent past medical history who presents for left breast pain. The pain has been present for approximately 2 days in duration. She states that has noted associated swelling and erythema of her breast. States that these symptoms have worsened over the last few days. She does complain of subjective chills and fever of 101.0 at home. Denies any purulent discharge. Patient was initially evaluated 2 weeks ago and given PO clindamycin.  Patient took this for a few days but then developed itching.  She was then given Keflex which provided some improvement however her symptoms acutely worsened over the last few days.  Patient's daughter is currently about 2 years old.  She was already in the process of weaning off breast-feeding.  Does not plan to breast-feed after this episode.  She is noted to have multiple drug allergies including allergies to penicillin and sulfa.       Active Ambulatory Problems     Diagnosis Date Noted   • No Active Ambulatory Problems     Resolved Ambulatory Problems     Diagnosis Date Noted   • No Resolved Ambulatory Problems     No Additional Past Medical History       PHYSICAL EXAM  Vitals:   Weight/BMI: Body mass index is 24.06 kg/m².  Blood pressure 112/74, pulse 76, temperature 36.4 °C (97.6 °F), temperature source Temporal, resp. rate 18, height 1.6 m (5' 3\"), weight 61.6 kg (135 lb 12.9 oz), last menstrual period 10/29/2018, SpO2 100 %.  Vitals:    11/02/18 1315 11/02/18 1345 11/02/18 1515 11/02/18 1559   BP:       Pulse: 74 71 70 76   Resp: (!) 22 13 17 18   Temp:       TempSrc:       SpO2: 98% 99% 97% 100%   Weight:       Height:     "     Oxygen Therapy:  Pulse Oximetry: 100 %, O2 (LPM): 0, FiO2%: 21 %    Physical Exam   Constitutional: She is oriented to person, place, and time and well-developed, well-nourished, and in no distress.   HENT:   Head: Normocephalic and atraumatic.   Mouth/Throat: No oropharyngeal exudate.   Eyes: Pupils are equal, round, and reactive to light. Conjunctivae are normal. No scleral icterus.   Neck: Normal range of motion. Neck supple. No JVD present. No thyromegaly present.   Cardiovascular: Normal rate, regular rhythm and normal heart sounds.    No murmur heard.  Pulmonary/Chest: Effort normal and breath sounds normal. No respiratory distress. She has no wheezes.   Abdominal: Soft. Bowel sounds are normal. She exhibits no distension. There is no tenderness. There is no rebound.   Musculoskeletal: Normal range of motion. She exhibits no edema.   Neurological: She is alert and oriented to person, place, and time. No cranial nerve deficit.   Skin: Skin is dry. There is erythema.   Patient noted to have erythema of left breast, tender to palpation, positive fluctuance with possible fluid collection   Psychiatric: Affect normal.       Imaging:  US-CHEST   Final Result      1.  No abscess is identified.      2.  Edema in the subcutaneous tissues of the left breast.          ASSESSMENT:  1) left breast mastitis, rule out possible abscess  2) left chest pain  3) normocytic anemia      PLAN:  -Patient presents with 2-week history of left breast pain, fevers, worsening symptoms  -Received outpatient trial of clindamycin and Keflex  -SIRS 1/4, qSOFA 1/3 on admit, no evidence of leukocytosis  -Ultrasound of left breast negative for abscess  -Given patient's presentation of fevers, worsening symptoms we will start patient on empiric antibiotics at this time, she is allergic to penicillins and sulfa, will give empiric vancomycin  -General surgery consulted, appreciate recommendations, n.p.o. until evaluation  -CT scan of chest  with contrast pending to evaluate for abscess  -Iron panel, ferritin pending  -EKG shows some nonspecific, diffuse ST elevation, some concern for pericarditis, will obtain echo, consider treating with ibuprofen if pain does not improve      Baylee Sandhu M.D.

## 2018-11-02 NOTE — ED PROVIDER NOTES
ED Provider Note    CHIEF COMPLAINT  Chief Complaint   Patient presents with   • Sent by MD     pt has mastitis and seen by OB, ob reports possible staph infection.       [facility]  Sofia Viramontes is a 43 y.o. female who presents for evaluation of persistent and worsening left breast pain and redness with swelling.  The patient is currently breast-feeding.  She has a 2-year-old child that is in the process of weaning.  She developed some redness and irritation in her left breast consistent with mastitis.  Shes has already finished a course of Keflex and is now on clindamycin.  She was being managed by her PCP but then was referred to OB/GYN and saw Dr. Hurtado today.  Out of concern for breast abscess.  She did have a ultrasound last week which did not demonstrate an abscess at that time.  She denies high fevers or chills denies pregnancy    REVIEW OF SYSTEMS  See HPI for further details.  No night sweats weight loss numbness tingling weakness rash all other systems are negative.     PAST MEDICAL HISTORY  No past medical history on file.  History of mastitis  FAMILY HISTORY  Noncontributory    SOCIAL HISTORY  Social History     Social History   • Marital status:      Spouse name: N/A   • Number of children: N/A   • Years of education: N/A     Social History Main Topics   • Smoking status: Never Smoker   • Smokeless tobacco: Never Used   • Alcohol use No   • Drug use: No   • Sexual activity: Not on file     Other Topics Concern   • Not on file     Social History Narrative   • No narrative on file   No IV drug    SURGICAL HISTORY  Past Surgical History:   Procedure Laterality Date   • GYN SURGERY             CURRENT MEDICATIONS  Home Medications     Reviewed by Janessa Emmanuel R.N. (Registered Nurse) on 18 at 1035  Med List Status: Complete   Medication Last Dose Status   cephALEXin (KEFLEX) 500 MG Cap 2018 Active   clindamycin (CLEOCIN) 300 MG Cap not taking Active           "      ALLERGIES  Allergies   Allergen Reactions   • Amoxicillin Hives   • Clindamycin Itching   • Penicillins Hives   • Septra [Sulfamethoxazole W-Trimethoprim] Hives   • Sulfa Drugs Hives       PHYSICAL EXAM  VITAL SIGNS: /74   Pulse (!) 103   Temp 36.4 °C (97.6 °F) (Temporal)   Resp 16   Ht 1.6 m (5' 3\")   Wt 61.6 kg (135 lb 12.9 oz)   LMP 10/29/2018   SpO2 (!) 86%   BMI 24.06 kg/m²       Constitutional: Well developed, Well nourished, No acute distress, Non-toxic appearance.   HENT: Normocephalic, Atraumatic, Bilateral external ears normal, Oropharynx moist, No oral exudates, Nose normal.   Eyes: PERRLA, EOMI, Conjunctiva normal, No discharge.   Neck: Normal range of motion, No tenderness, Supple, No stridor.   Cardiovascular: Normal heart rate, Normal rhythm, No murmurs, No rubs, No gallops.   Thorax & Lungs: Normal breath sounds, No respiratory distress, No wheezing, No chest tenderness.  Breast exam: Female nurse Adela was in the room during the exam.  Right breast is normal.  Left breast is notable for significant swelling on the superior aspect above the nipple no nipple retraction or nipple discharge there is erythema, possible fluctuance concerning for possible abscess  Abdomen: Bowel sounds normal, Soft, No tenderness, No masses, No pulsatile masses.   Skin: Warm, Dry, No erythema, No rash.   Back: No tenderness, No CVA tenderness.   Extremities: Intact distal pulses, No edema, No tenderness, No cyanosis, No clubbing.   Neurologic: Alert & oriented x 3, Normal motor function, Normal sensory function, No focal deficits noted.   Psychiatric: Anxious      RADIOLOGY/PROCEDURES  US-CHEST   Final Result      1.  No abscess is identified.      2.  Edema in the subcutaneous tissues of the left breast.        Results for orders placed or performed during the hospital encounter of 11/02/18   CBC WITH DIFFERENTIAL   Result Value Ref Range    WBC 9.3 4.8 - 10.8 K/uL    RBC 3.90 (L) 4.20 - 5.40 M/uL "    Hemoglobin 11.8 (L) 12.0 - 16.0 g/dL    Hematocrit 36.4 (L) 37.0 - 47.0 %    MCV 93.3 81.4 - 97.8 fL    MCH 30.3 27.0 - 33.0 pg    MCHC 32.4 (L) 33.6 - 35.0 g/dL    RDW 44.2 35.9 - 50.0 fL    Platelet Count 546 (H) 164 - 446 K/uL    MPV 8.9 (L) 9.0 - 12.9 fL    Neutrophils-Polys 61.30 44.00 - 72.00 %    Lymphocytes 28.00 22.00 - 41.00 %    Monocytes 6.10 0.00 - 13.40 %    Eosinophils 3.10 0.00 - 6.90 %    Basophils 1.20 0.00 - 1.80 %    Immature Granulocytes 0.30 0.00 - 0.90 %    Nucleated RBC 0.00 /100 WBC    Neutrophils (Absolute) 5.70 2.00 - 7.15 K/uL    Lymphs (Absolute) 2.60 1.00 - 4.80 K/uL    Monos (Absolute) 0.57 0.00 - 0.85 K/uL    Eos (Absolute) 0.29 0.00 - 0.51 K/uL    Baso (Absolute) 0.11 0.00 - 0.12 K/uL    Immature Granulocytes (abs) 0.03 0.00 - 0.11 K/uL    NRBC (Absolute) 0.00 K/uL   COMP METABOLIC PANEL   Result Value Ref Range    Sodium 139 135 - 145 mmol/L    Potassium 3.9 3.6 - 5.5 mmol/L    Chloride 106 96 - 112 mmol/L    Co2 26 20 - 33 mmol/L    Anion Gap 7.0 0.0 - 11.9    Glucose 90 65 - 99 mg/dL    Bun 14 8 - 22 mg/dL    Creatinine 0.74 0.50 - 1.40 mg/dL    Calcium 9.0 8.5 - 10.5 mg/dL    AST(SGOT) 14 12 - 45 U/L    ALT(SGPT) 11 2 - 50 U/L    Alkaline Phosphatase 59 30 - 99 U/L    Total Bilirubin 0.4 0.1 - 1.5 mg/dL    Albumin 3.8 3.2 - 4.9 g/dL    Total Protein 7.5 6.0 - 8.2 g/dL    Globulin 3.7 (H) 1.9 - 3.5 g/dL    A-G Ratio 1.0 g/dL   HCG QUAL SERUM   Result Value Ref Range    Beta-Hcg Qualitative Serum Negative Negative   ESTIMATED GFR   Result Value Ref Range    GFR If African American >60 >60 mL/min/1.73 m 2    GFR If Non African American >60 >60 mL/min/1.73 m 2   EKG   Result Value Ref Range    Report       [facility]    Test Date:  2018  Pt Name:    [facility]                  Department: ER  MRN:        [id number]                      Room:        09  Gender:     Female                       Technician: [id number]  :        1975                    Requested By:FRANCISCO COVINGTON  Order #:    [id number]                    Reading MD:    Measurements  Intervals                                Axis  Rate:       69                           P:  MO:                                      QRS:        50  QRSD:       104                          T:          45  QT:         408  QTc:        437    Interpretive Statements  ACCELERATED JUNCTIONAL ESCAPE RHYTHM  ST ELEVATION SUGGESTS PERICARDITIS  ARTIFACT IN LEAD(S) I,II,aVR,aVL,aVF  No previous ECG available for comparison           COURSE & MEDICAL DECISION MAKING  Pertinent Labs & Imaging studies reviewed. (See chart for details)  Patient presents here with worsening erythema and pain in her left breast.  I was concerned about possible abscess however ultrasound did not demonstrate any obvious abscess.  She is Andrea been on 2 oral antibiotics without any improvement.  She was given IV vancomycin and will be admitted to [facility]    FINAL IMPRESSION  1.  Acute left breast mastitis with cellulitis failed outpatient management      Electronically signed by: Francisco Covington, 11/2/2018 11:21 AM

## 2018-11-02 NOTE — ED NOTES
Pt medicated per MAR.  Admitting MD at bedside and female chaperone at bedside for breast assessment.

## 2018-11-02 NOTE — TELEPHONE ENCOUNTER
Noted. Also, called the patient today and spoke with her. She did see Ob/Gyn this morning who recommended she go the ER for IV antibiotics. Patient is currently in the ER getting further evaluation and treatment.

## 2018-11-02 NOTE — PROGRESS NOTES
"Pharmacy Kinetics 43 y.o. female on vancomycin day # 1 2018    Currently on Vancomycin 1500 mg iv once (25 mg/kg)    Indication for Treatment: SSTI/Mastitis    Pertinent history per medical record: Admitted on 2018 for left mastitis.  Patient is currently breastfeeding and had noted swelling and erythema extending from her L armpit to breast.  She was started on oral antibiotics as an outpatient but had worsening symptoms and admitted to the hospital for IV antibiotics and surgical consultation.  US was negative for abscess formation.    Other antibiotics: None    Allergies: Amoxicillin; Clindamycin; Penicillins; Septra [sulfamethoxazole w-trimethoprim]; and Sulfa drugs     List concerns for renal function : None    Pertinent cultures to date:    - peripheral blood culture - to be collected    Recent Labs      18   1120   WBC  9.3   NEUTSPOLYS  61.30     Recent Labs      18   1120   BUN  14   CREATININE  0.74   ALBUMIN  3.8     No results for input(s): VANCOTROUGH, VANCOPEAK, VANCORANDOM in the last 72 hours.No intake or output data in the 24 hours ending 18 1621   Blood pressure 112/74, pulse 95, temperature 36.4 °C (97.6 °F), temperature source Temporal, resp. rate (!) 22, height 1.6 m (5' 3\"), weight 61.6 kg (135 lb 12.9 oz), last menstrual period 10/29/2018, SpO2 95 %. Temp (24hrs), Av.4 °C (97.6 °F), Min:36.4 °C (97.6 °F), Max:36.4 °C (97.6 °F)      A/P   1. Vancomycin dose change: Vancomycin 1000 mg (17 mg/kg) IV q12h  2. Next vancomycin level: ~2 days (not ordered)  3. Goal trough: 12-16 mcg/mL  4. Comments: No previous vancomycin administrations per chart review.  No concerns for accumulation.  Recommend prompt de-escalation if MRSA can be ruled out.  Pharmacy will continue to monitor.    Norma Leija, PharmD., BCPS      "

## 2018-11-02 NOTE — PROGRESS NOTES
Med rec complete per pt at bedside  Interviewed pt with family at bedside with permission from pt  Allergies reviewed and updated.    Pt stopped Clindamycin 10/24/18 after she started itching . Pt was then prescribed keflex and started 10/24/18 for 10 days.

## 2018-11-03 ENCOUNTER — APPOINTMENT (OUTPATIENT)
Dept: CARDIOLOGY | Facility: MEDICAL CENTER | Age: 43
DRG: 600 | End: 2018-11-03
Attending: STUDENT IN AN ORGANIZED HEALTH CARE EDUCATION/TRAINING PROGRAM
Payer: MEDICAID

## 2018-11-03 LAB
ALBUMIN SERPL BCP-MCNC: 2.9 G/DL (ref 3.2–4.9)
ALBUMIN/GLOB SERPL: 0.9 G/DL
ALP SERPL-CCNC: 49 U/L (ref 30–99)
ALT SERPL-CCNC: 9 U/L (ref 2–50)
ANION GAP SERPL CALC-SCNC: 6 MMOL/L (ref 0–11.9)
AST SERPL-CCNC: 12 U/L (ref 12–45)
BASOPHILS # BLD AUTO: 1.2 % (ref 0–1.8)
BASOPHILS # BLD: 0.1 K/UL (ref 0–0.12)
BILIRUB SERPL-MCNC: 0.2 MG/DL (ref 0.1–1.5)
BUN SERPL-MCNC: 12 MG/DL (ref 8–22)
CALCIUM SERPL-MCNC: 8 MG/DL (ref 8.5–10.5)
CHLORIDE SERPL-SCNC: 111 MMOL/L (ref 96–112)
CO2 SERPL-SCNC: 23 MMOL/L (ref 20–33)
CORTIS SERPL-MCNC: 1.9 UG/DL (ref 0–23)
CREAT SERPL-MCNC: 0.71 MG/DL (ref 0.5–1.4)
EKG IMPRESSION: NORMAL
EOSINOPHIL # BLD AUTO: 0.3 K/UL (ref 0–0.51)
EOSINOPHIL NFR BLD: 3.5 % (ref 0–6.9)
ERYTHROCYTE [DISTWIDTH] IN BLOOD BY AUTOMATED COUNT: 43.4 FL (ref 35.9–50)
FERRITIN SERPL-MCNC: 104.5 NG/ML (ref 10–291)
GLOBULIN SER CALC-MCNC: 3.1 G/DL (ref 1.9–3.5)
GLUCOSE SERPL-MCNC: 99 MG/DL (ref 65–99)
HCT VFR BLD AUTO: 33 % (ref 37–47)
HGB BLD-MCNC: 10.9 G/DL (ref 12–16)
IMM GRANULOCYTES # BLD AUTO: 0.03 K/UL (ref 0–0.11)
IMM GRANULOCYTES NFR BLD AUTO: 0.3 % (ref 0–0.9)
LYMPHOCYTES # BLD AUTO: 2.72 K/UL (ref 1–4.8)
LYMPHOCYTES NFR BLD: 31.7 % (ref 22–41)
MAGNESIUM SERPL-MCNC: 1.9 MG/DL (ref 1.5–2.5)
MCH RBC QN AUTO: 30.4 PG (ref 27–33)
MCHC RBC AUTO-ENTMCNC: 33 G/DL (ref 33.6–35)
MCV RBC AUTO: 92.2 FL (ref 81.4–97.8)
MONOCYTES # BLD AUTO: 0.7 K/UL (ref 0–0.85)
MONOCYTES NFR BLD AUTO: 8.1 % (ref 0–13.4)
NEUTROPHILS # BLD AUTO: 4.74 K/UL (ref 2–7.15)
NEUTROPHILS NFR BLD: 55.2 % (ref 44–72)
NRBC # BLD AUTO: 0 K/UL
NRBC BLD-RTO: 0 /100 WBC
PHOSPHATE SERPL-MCNC: 3.3 MG/DL (ref 2.5–4.5)
PLATELET # BLD AUTO: 510 K/UL (ref 164–446)
PMV BLD AUTO: 9 FL (ref 9–12.9)
POTASSIUM SERPL-SCNC: 4 MMOL/L (ref 3.6–5.5)
PROCALCITONIN SERPL-MCNC: <0.05 NG/ML
PROT SERPL-MCNC: 6 G/DL (ref 6–8.2)
RBC # BLD AUTO: 3.58 M/UL (ref 4.2–5.4)
SODIUM SERPL-SCNC: 140 MMOL/L (ref 135–145)
TSH SERPL DL<=0.005 MIU/L-ACNC: 1.25 UIU/ML (ref 0.38–5.33)
VIT B12 SERPL-MCNC: >1500 PG/ML (ref 211–911)
WBC # BLD AUTO: 8.6 K/UL (ref 4.8–10.8)

## 2018-11-03 PROCEDURE — 99232 SBSQ HOSP IP/OBS MODERATE 35: CPT | Mod: GC | Performed by: INTERNAL MEDICINE

## 2018-11-03 PROCEDURE — 82607 VITAMIN B-12: CPT

## 2018-11-03 PROCEDURE — 85025 COMPLETE CBC W/AUTO DIFF WBC: CPT

## 2018-11-03 PROCEDURE — 700105 HCHG RX REV CODE 258: Performed by: STUDENT IN AN ORGANIZED HEALTH CARE EDUCATION/TRAINING PROGRAM

## 2018-11-03 PROCEDURE — 83735 ASSAY OF MAGNESIUM: CPT

## 2018-11-03 PROCEDURE — 700111 HCHG RX REV CODE 636 W/ 250 OVERRIDE (IP): Performed by: INTERNAL MEDICINE

## 2018-11-03 PROCEDURE — 90471 IMMUNIZATION ADMIN: CPT

## 2018-11-03 PROCEDURE — 82533 TOTAL CORTISOL: CPT

## 2018-11-03 PROCEDURE — 84100 ASSAY OF PHOSPHORUS: CPT

## 2018-11-03 PROCEDURE — 700102 HCHG RX REV CODE 250 W/ 637 OVERRIDE(OP): Performed by: STUDENT IN AN ORGANIZED HEALTH CARE EDUCATION/TRAINING PROGRAM

## 2018-11-03 PROCEDURE — 87040 BLOOD CULTURE FOR BACTERIA: CPT

## 2018-11-03 PROCEDURE — 84145 PROCALCITONIN (PCT): CPT

## 2018-11-03 PROCEDURE — 80053 COMPREHEN METABOLIC PANEL: CPT

## 2018-11-03 PROCEDURE — 700111 HCHG RX REV CODE 636 W/ 250 OVERRIDE (IP): Performed by: STUDENT IN AN ORGANIZED HEALTH CARE EDUCATION/TRAINING PROGRAM

## 2018-11-03 PROCEDURE — 90686 IIV4 VACC NO PRSV 0.5 ML IM: CPT | Performed by: INTERNAL MEDICINE

## 2018-11-03 PROCEDURE — 93306 TTE W/DOPPLER COMPLETE: CPT

## 2018-11-03 PROCEDURE — 82728 ASSAY OF FERRITIN: CPT

## 2018-11-03 PROCEDURE — 700105 HCHG RX REV CODE 258: Performed by: INTERNAL MEDICINE

## 2018-11-03 PROCEDURE — A9270 NON-COVERED ITEM OR SERVICE: HCPCS | Performed by: STUDENT IN AN ORGANIZED HEALTH CARE EDUCATION/TRAINING PROGRAM

## 2018-11-03 PROCEDURE — 93306 TTE W/DOPPLER COMPLETE: CPT | Mod: 26 | Performed by: INTERNAL MEDICINE

## 2018-11-03 PROCEDURE — 770006 HCHG ROOM/CARE - MED/SURG/GYN SEMI*

## 2018-11-03 PROCEDURE — 84443 ASSAY THYROID STIM HORMONE: CPT

## 2018-11-03 PROCEDURE — 36415 COLL VENOUS BLD VENIPUNCTURE: CPT

## 2018-11-03 PROCEDURE — 3E0234Z INTRODUCTION OF SERUM, TOXOID AND VACCINE INTO MUSCLE, PERCUTANEOUS APPROACH: ICD-10-PCS | Performed by: INTERNAL MEDICINE

## 2018-11-03 RX ADMIN — SODIUM CHLORIDE: 9 INJECTION, SOLUTION INTRAVENOUS at 05:09

## 2018-11-03 RX ADMIN — INFLUENZA A VIRUS A/MICHIGAN/45/2015 X-275 (H1N1) ANTIGEN (FORMALDEHYDE INACTIVATED), INFLUENZA A VIRUS A/SINGAPORE/INFIMH-16-0019/2016 IVR-186 (H3N2) ANTIGEN (FORMALDEHYDE INACTIVATED), INFLUENZA B VIRUS B/PHUKET/3073/2013 ANTIGEN (FORMALDEHYDE INACTIVATED), AND INFLUENZA B VIRUS B/MARYLAND/15/2016 BX-69A ANTIGEN (FORMALDEHYDE INACTIVATED) 0.5 ML: 15; 15; 15; 15 INJECTION, SUSPENSION INTRAMUSCULAR at 12:21

## 2018-11-03 RX ADMIN — VANCOMYCIN HYDROCHLORIDE 1000 MG: 100 INJECTION, POWDER, LYOPHILIZED, FOR SOLUTION INTRAVENOUS at 05:09

## 2018-11-03 RX ADMIN — ACETAMINOPHEN 650 MG: 325 TABLET, FILM COATED ORAL at 07:25

## 2018-11-03 RX ADMIN — SODIUM CHLORIDE: 9 INJECTION, SOLUTION INTRAVENOUS at 20:56

## 2018-11-03 RX ADMIN — VANCOMYCIN HYDROCHLORIDE 1000 MG: 100 INJECTION, POWDER, LYOPHILIZED, FOR SOLUTION INTRAVENOUS at 16:11

## 2018-11-03 RX ADMIN — ACETAMINOPHEN 650 MG: 325 TABLET, FILM COATED ORAL at 16:15

## 2018-11-03 RX ADMIN — SODIUM CHLORIDE: 9 INJECTION, SOLUTION INTRAVENOUS at 11:49

## 2018-11-03 ASSESSMENT — ENCOUNTER SYMPTOMS
COUGH: 0
BLURRED VISION: 0
PALPITATIONS: 0
NECK PAIN: 0
EYE PAIN: 0
HEARTBURN: 0
FEVER: 0
HEMOPTYSIS: 0
HEADACHES: 0
DIZZINESS: 0
CHILLS: 0
NAUSEA: 0
MYALGIAS: 0

## 2018-11-03 ASSESSMENT — PAIN SCALES - GENERAL
PAINLEVEL_OUTOF10: 1
PAINLEVEL_OUTOF10: 0
PAINLEVEL_OUTOF10: 4
PAINLEVEL_OUTOF10: 0
PAINLEVEL_OUTOF10: 4
PAINLEVEL_OUTOF10: 0
PAINLEVEL_OUTOF10: 2

## 2018-11-03 NOTE — CARE PLAN
Problem: Safety  Goal: Will remain free from injury  Outcome: PROGRESSING AS EXPECTED  .Patient and their room was assessed for risk factors that contribute to falls.  Patient was educated on using the call light and the light was kept within the patient's reach. Room was kept free of clutter and the patient was assisted to bathroom.  Patient was educated on why bed alarms are on. Bed in lowest position and upper side rails up.         Problem: Infection  Goal: Will remain free from infection  Outcome: PROGRESSING AS EXPECTED  .Patient received verbal  instructions on infection prevention. Hand hygiene implemented for standard precautions against spread of infection.

## 2018-11-03 NOTE — PROGRESS NOTES
Internal Medicine Interval Note  Note Author: Kael Umanzor M.D.     Name Sofia Viramontes       1975   Age/Sex 43 y.o. female   MRN [id number]   Code Status Full     After 5PM or if no immediate response to page, please call for cross-coverage  Attending/Team: Dr. Norman / Malcolm See Patient List for primary contact information  Call [phone] to page    1st Call - Day Intern (R1):   Dr. Umanzor 2nd Call - Day Sr. Resident (R2/R3):   Dr. Sandhu     Reason for interval visit  (Principal Problem)   Likely mastitis    Interval Problem Daily Status Update  (24 hours, problem oriented, brief subjective history, new lab/imaging data pertinent to that problem)   No fevers, chills, or night sweats overnight. She states that the redness and swelling is unchanged but the breast is not as warm as it was. Chest pain has decreased and it is very mild now.    Review of Systems   Constitutional: Negative for chills and fever.   HENT: Negative for congestion and ear pain.    Eyes: Negative for blurred vision and pain.   Respiratory: Negative for cough and hemoptysis.    Cardiovascular: Negative for chest pain and palpitations.   Gastrointestinal: Negative for heartburn and nausea.   Genitourinary: Negative for dysuria and urgency.   Musculoskeletal: Negative for myalgias and neck pain.   Skin: Negative for itching and rash.   Neurological: Negative for dizziness and headaches.     Disposition/Barriers to discharge:   None    Consultants/Specialty  Surgery  PCP: Pcp Pt States None    Quality Measures  Quality-Core Measures   Reviewed items::  Labs reviewed, Medications reviewed, EKG reviewed and Radiology images reviewed  Mercedes catheter::  No Mercedes  DVT prophylaxis - mechanical:  SCDs    Physical Exam       Vitals:    18 2025 18 0000 18 0402 18 0845   BP: 110/73 106/75 102/64 108/68   Pulse: 77 75 80 76   Resp: 16 16 14 17   Temp: 37 °C (98.6 °F) 36.7 °C (98.1 °F) 37.2 °C (99 °F) 37.2  °C (98.9 °F)   TempSrc:       SpO2: 97% 94% 97% 94%   Weight:       Height:         Body mass index is 23.39 kg/m². Weight: 59.9 kg (132 lb 0.9 oz)  Oxygen Therapy:  Pulse Oximetry: 94 %, O2 (LPM): 0, FiO2%: 21 %, O2 Delivery: None (Room Air)    Physical Exam   Constitutional: She is well-developed, well-nourished, and in no distress. No distress.   HENT:   Head: Normocephalic and atraumatic.   Eyes: Conjunctivae are normal. Right eye exhibits no discharge. Left eye exhibits no discharge.   Neck: Normal range of motion. Neck supple. No tracheal deviation present. No thyromegaly present.   Cardiovascular: Normal rate, regular rhythm, normal heart sounds and intact distal pulses.  Exam reveals no gallop and no friction rub.    No murmur heard.  Pulmonary/Chest: Effort normal and breath sounds normal. No respiratory distress. She has no wheezes. She has no rales. She exhibits no tenderness.   Abdominal: Soft. Bowel sounds are normal. She exhibits no distension and no mass. There is no tenderness. There is no rebound and no guarding.   Musculoskeletal: Normal range of motion.   Neurological: She is alert. She exhibits normal muscle tone. Coordination normal.   Skin: Skin is warm. She is not diaphoretic. There is erythema.   Mild improvement and regression of erythema on her left breast, continues to be significantly swollen, tender and erythematous spreading outwards from nipple     Assessment/Plan     Acute mastitis of left breast- (present on admission)   Assessment & Plan    MILD IMPROVEMENT  Started 2 weeks ago. Failed outpatient antibiotic treatment: keflex, clindamycin. Fevers+. Ultrasound shows no abscess.  Most likely acute mastitis less likely inflammatory breast cancer, plugged duct, abscess. Procal WNL. No leukocytosis. CT scan shows no abscess but likely mastoiditis and skin thickening most likely due to cellulitis but inflammatory carcinoma cannot be excluded.  - SIRS 1/4, qSOFA 1/3  - blood cultures  -  vancomycin  - general surgery: no intervention as of now, monitor for improvement  - IVF          Normocytic anemia- (present on admission)   Assessment & Plan    Asymptomatic. Possibly due to blood loss or chronic inflammation. TSH, LDH, Iron studies WNL  - ferritin  - B12  - folate  - reticulocytes        Chest pain- (present on admission)   Assessment & Plan    New onset today. Mild. Worse with laying down. Diffuse non-significant ST elevations ok EKG. Troponin negative.  - ECHO          Thrombocytosis (HCC)- (present on admission)   Assessment & Plan    Likely reactive,CTM

## 2018-11-03 NOTE — ASSESSMENT & PLAN NOTE
New onset today. Mild. Worse with laying down. Diffuse non-significant ST elevations ok EKG. Troponin negative. Echo is WNL.

## 2018-11-03 NOTE — CARE PLAN
Problem: Communication  Goal: The ability to communicate needs accurately and effectively will improve  Outcome: PROGRESSING AS EXPECTED  Pt updated on plan of care, discussed orders and medications    Problem: Safety  Goal: Will remain free from injury  Outcome: PROGRESSING AS EXPECTED  Pt instructed to use call light, fall education provided

## 2018-11-03 NOTE — H&P
Internal Medicine Admitting History and Physical    Note Author: Kael Umanzor M.D.       Name Sofia Viramontes       1975   Age/Sex 43 y.o. female   MRN [id number]   Code Status Full     After 5PM or if no immediate response to page, please call for cross-coverage  Attending/Team: Malcolm See See Patient List for primary contact information  Call [phone] to page    1st Call - Day Intern (R1):   Dr. Umanzor 2nd Call - Day Sr. Resident (R2/R3):   Dr. Sandhu     Chief Complaint:   Breast pain and redness    HPI:  43 year old female with no significant past medical history presents after failed outpatient for suspected left mastitis. She has been breastfeeding her 2 year old and about 1 month ago, her left breast started to become painful. She had similar pains about one year ago for the same child, but this pain resolved. Two weeks ago she noticed a painful nodule in her left arm. She also noted an episode of 2 weeks ago when she had vomiting and diarrhea. Further, the the swelling and erythema started to spread from armpit to the breast. She was initially seen and started on 10/23/18 with keflex. However, she developed a severe rash and she was switched to clindamycin around 10/25/18. She noticed some improvement around 10/30/18, but then symptoms have been progressively worse and she started to have fevers. She was evaluated on 10/31/18 by OBGYN with U/S that did not show an abscess per patient. The symptoms did not improve and she was evaluated again on 2018 and sent to emergency room. She tried other supportive therapies that included cold and warm compresses, tylenol, and ibuprofen. Patient also states that she has been developing today mild non-radiating substernal chest pain that is nonexertional but it is worsened by laying flat. She has a history of grandmother with breast cancer at age of 60.    Review of Systems   Constitutional: Positive for chills and fever. Negative for  malaise/fatigue and weight loss.   HENT: Negative for congestion and ear pain.    Eyes: Negative for blurred vision and pain.   Respiratory: Negative for cough, hemoptysis, sputum production and shortness of breath.    Cardiovascular: Positive for chest pain. Negative for palpitations, orthopnea, claudication and leg swelling.   Gastrointestinal: Positive for diarrhea (2 weeks ago, resolved) and vomiting (2 weeks ago, resolved). Negative for abdominal pain, blood in stool, constipation, heartburn and nausea.   Genitourinary: Negative for dysuria, frequency, hematuria and urgency.   Musculoskeletal: Negative for back pain, myalgias and neck pain.   Skin: Positive for itching (improved) and rash (generalized, due to keflex, but now resolved).   Neurological: Negative for dizziness, tremors, sensory change, speech change, focal weakness, weakness and headaches.     Past Medical History (Chronic medical problem, known complications and current treatment)    History reviewed. No pertinent past medical history.    Past Surgical History:  Past Surgical History:   Procedure Laterality Date   • GYN SURGERY             Current Outpatient Medications:  Home Medications     Reviewed by Fran Craig (Pharmacy Tech) on 18 at 1452  Med List Status: Complete   Medication Last Dose Status   acetaminophen (TYLENOL) 500 MG Tab 2018 Active   cephALEXin (KEFLEX) 500 MG Cap 2018 Active   Diaper Rash Products (DESITIN EX) 10/24/2018 Active   ibuprofen (MOTRIN) 200 MG Tab 2018 Active   multivitamin (THERAGRAN) Tab 2018 Active                Medication Allergy/Sensitivities:  Allergies   Allergen Reactions   • Amoxicillin Hives     RXN=early 20's   • Clindamycin Itching     RXN=10/24/18   • Penicillins Hives     RXN=early 20's   • Septra [Sulfamethoxazole W-Trimethoprim] Hives     RXN=early 20's   • Sulfa Drugs Hives     RXN=early 20's         Family History (mandatory)   History reviewed. No  "pertinent family history.  Grandmother breast cancer at age 60    Social History (mandatory)   Social History     Social History   • Marital status:      Spouse name: N/A   • Number of children: N/A   • Years of education: N/A     Occupational History   • Not on file.     Social History Main Topics   • Smoking status: Never Smoker   • Smokeless tobacco: Never Used   • Alcohol use No   • Drug use: No   • Sexual activity: Not on file     Other Topics Concern   • Not on file     Social History Narrative   • No narrative on file     Living situation: With family  PCP : Pcp Pt States None    Physical Exam     Vitals:    11/02/18 1545 11/02/18 1559 11/02/18 1615 11/02/18 1738   BP:    (!) 92/63   Pulse: 73 76 95 67   Resp: 18 18 (!) 22 20   Temp:    37.8 °C (100.1 °F)   TempSrc:       SpO2: 100% 100% 95% 96%   Weight:    59.9 kg (132 lb 0.9 oz)   Height:    1.6 m (5' 3\")     Body mass index is 23.39 kg/m².  BP (!) 92/63   Pulse 67   Temp 37.8 °C (100.1 °F)   Resp 20   Ht 1.6 m (5' 3\")   Wt 59.9 kg (132 lb 0.9 oz)   LMP 10/29/2018   SpO2 96%   Breastfeeding? Yes   BMI 23.39 kg/m²   O2 therapy: Pulse Oximetry: 96 %, O2 (LPM): 0, FiO2%: 21 %, O2 Delivery: None (Room Air)    Physical Exam   Constitutional: She is well-developed, well-nourished, and in no distress. No distress.   HENT:   Head: Normocephalic and atraumatic.   Eyes: Conjunctivae are normal. Right eye exhibits no discharge.   Neck: Normal range of motion. Neck supple. No tracheal deviation present. No thyromegaly present.   Cardiovascular: Normal rate, regular rhythm, normal heart sounds and intact distal pulses.  Exam reveals no gallop and no friction rub.    No murmur heard.  Pulmonary/Chest: Effort normal and breath sounds normal. No respiratory distress. She has no wheezes. She has no rales. She exhibits no tenderness.   Abdominal: Soft. Bowel sounds are normal. She exhibits no distension and no mass. There is no tenderness. There is no " rebound and no guarding.   Musculoskeletal: Normal range of motion. She exhibits edema (left breast).   Neurological: She is alert. She exhibits normal muscle tone. Coordination normal.   Skin: Skin is warm. No rash noted. She is not diaphoretic.        Left breast much larger than right  Left breast erythematous, warm to touch, tender to palpation, there is a soft small that can be palpated laterally  No left axilla lymphadenopathy   Psychiatric: Affect and judgment normal.         Data Review       Old Records Request:   Completed  Current Records review/summary: Completed    Lab Data Review:  Recent Results (from the past 24 hour(s))   CBC WITH DIFFERENTIAL    Collection Time: 11/02/18 11:20 AM   Result Value Ref Range    WBC 9.3 4.8 - 10.8 K/uL    RBC 3.90 (L) 4.20 - 5.40 M/uL    Hemoglobin 11.8 (L) 12.0 - 16.0 g/dL    Hematocrit 36.4 (L) 37.0 - 47.0 %    MCV 93.3 81.4 - 97.8 fL    MCH 30.3 27.0 - 33.0 pg    MCHC 32.4 (L) 33.6 - 35.0 g/dL    RDW 44.2 35.9 - 50.0 fL    Platelet Count 546 (H) 164 - 446 K/uL    MPV 8.9 (L) 9.0 - 12.9 fL    Neutrophils-Polys 61.30 44.00 - 72.00 %    Lymphocytes 28.00 22.00 - 41.00 %    Monocytes 6.10 0.00 - 13.40 %    Eosinophils 3.10 0.00 - 6.90 %    Basophils 1.20 0.00 - 1.80 %    Immature Granulocytes 0.30 0.00 - 0.90 %    Nucleated RBC 0.00 /100 WBC    Neutrophils (Absolute) 5.70 2.00 - 7.15 K/uL    Lymphs (Absolute) 2.60 1.00 - 4.80 K/uL    Monos (Absolute) 0.57 0.00 - 0.85 K/uL    Eos (Absolute) 0.29 0.00 - 0.51 K/uL    Baso (Absolute) 0.11 0.00 - 0.12 K/uL    Immature Granulocytes (abs) 0.03 0.00 - 0.11 K/uL    NRBC (Absolute) 0.00 K/uL   COMP METABOLIC PANEL    Collection Time: 11/02/18 11:20 AM   Result Value Ref Range    Sodium 139 135 - 145 mmol/L    Potassium 3.9 3.6 - 5.5 mmol/L    Chloride 106 96 - 112 mmol/L    Co2 26 20 - 33 mmol/L    Anion Gap 7.0 0.0 - 11.9    Glucose 90 65 - 99 mg/dL    Bun 14 8 - 22 mg/dL    Creatinine 0.74 0.50 - 1.40 mg/dL    Calcium 9.0 8.5 -  10.5 mg/dL    AST(SGOT) 14 12 - 45 U/L    ALT(SGPT) 11 2 - 50 U/L    Alkaline Phosphatase 59 30 - 99 U/L    Total Bilirubin 0.4 0.1 - 1.5 mg/dL    Albumin 3.8 3.2 - 4.9 g/dL    Total Protein 7.5 6.0 - 8.2 g/dL    Globulin 3.7 (H) 1.9 - 3.5 g/dL    A-G Ratio 1.0 g/dL   HCG QUAL SERUM    Collection Time: 18 11:20 AM   Result Value Ref Range    Beta-Hcg Qualitative Serum Negative Negative   ESTIMATED GFR    Collection Time: 18 11:20 AM   Result Value Ref Range    GFR If African American >60 >60 mL/min/1.73 m 2    GFR If Non African American >60 >60 mL/min/1.73 m 2   EKG    Collection Time: 18 12:14 PM   Result Value Ref Range    Report       [facility] Emergency Dept.    Test Date:  2018  Pt Name:    [facility]                  Department: ER  MRN:        [id number]                      Room:       RD 09  Gender:     Female                       Technician: [id number]  :        1975                   Requested By:JELLY MATSON  Order #:    [id number]                    Reading MD:    Measurements  Intervals                                Axis  Rate:       69                           P:  ME:                                      QRS:        50  QRSD:       104                          T:          45  QT:         408  QTc:        437    Interpretive Statements  ACCELERATED JUNCTIONAL ESCAPE RHYTHM  ST ELEVATION SUGGESTS PERICARDITIS  ARTIFACT IN LEAD(S) I,II,aVR,aVL,aVF  No previous ECG available for comparison     EKG    Collection Time: 18  5:52 PM   Result Value Ref Range    Report       [facility]    Test Date:  2018  Pt Name:    [facility]                  Department: ER  MRN:        [id number]                      Room:       T835  Gender:     Female                       Technician: [id number]  :        1975                   Requested By:ANGELICA DAMICO  Order #:    [id number]                    Reading MD: Wesley Hand  MD    Measurements  Intervals                                Axis  Rate:       75                           P:          49  FL:         133                          QRS:        45  QRSD:       86                           T:          45  QT:         382  QTc:        427    Interpretive Statements  SINUS RHYTHM      Electronically Signed On 11-2-2018 18:00:39 PDT by Wesley Hand MD         Imaging/Procedures Review:    Independant Imaging Review: Completed  US-CHEST   Final Result      1.  No abscess is identified.      2.  Edema in the subcutaneous tissues of the left breast.      EC-ECHOCARDIOGRAM COMPLETE W/ CONT    (Results Pending)   CT-CHEST (THORAX) WITH    (Results Pending)      EKG:   EKG Independant Review: Completed  QTc:437, HR: 69, Normal Sinus Rhythm,  ST elevated, non significant height, multiple leads  Poor quality EKG    Records reviewed and summarized in current documentation :  Yes  [employer] teaching service handout given to patient:  Yes       Assessment/Plan     Acute mastitis of left breast   Assessment & Plan    2 weeks. Failed outpatient antibiotic treatment: keflex, clindamycin. Fevers+. Ultrasound shows no abscess.  Most likely acute mastitis less likely inflammatory breast cancer, plugged duct, abscess  - SIRS 1/4, qSOFA 1/3  - no leukocytosis  - blood cultures  - vancomycin  - general surgery consult  - CT scan          Normocytic anemia   Assessment & Plan    Asymptomatic  - iron studies  - ferritin  - LDH  - haptoglobin  - TSH  - B12  - folate  - reticulocytes        Chest pain   Assessment & Plan    New onset today. Mild. Worse with laying down. Diffuse non-significant ST elevations ok EKG.   - troponin & EKG x2 q6h  - telemetry  - we will consider ibuprofen treatment if chest pain unresolved  - ECHO              Anticipated Hospital stay:  >2 midnights    Quality Measures  Quality-Core Measures   Reviewed items::  EKG reviewed, Medications reviewed, Labs reviewed and Radiology images  reviewed  Mercedes catheter::  No Mercedes    PCP: Pcp Pt States None

## 2018-11-03 NOTE — PROGRESS NOTES
DATE OF SERVICE:  11/03/2018    SUBJECTIVE:  No acute changes overnight.  The patient has no new complaints   this morning.  She has been started on vancomycin infusion for mastitis.  The   CT scan showed no evidence of abscess.  She is undergoing an echocardiogram   right now.    OBJECTIVE:  VITAL SIGNS:  Afebrile with stable vitals.    EXAM:  Exam deferred as she is undergoing an echocardiogram at this point in   time    Of note, the patient does not report any changes since I evaluated her last   night.    ASSESSMENT AND PLAN:  Left-sided mastitis resistant to oral antibiotics.  We   will manage the patient with IV antibiotics and monitor her progress.  If she   does not improve with that she can be reimaged for abscess or potentially   require biopsy to rule out other causes of the cellulitis such as a possible   inflammatory malignancy.       ____________________________________     MD JORGE SerratoJ / NTS    DD:  11/03/2018 10:09:25  DT:  11/03/2018 10:35:38    D#:  [id number]  Job#:  [id number]

## 2018-11-03 NOTE — CONSULTS
General Surgery Consult    Date of Service: 2018    Consulting Physician: Mikey Louie M.D. [facility]    -------------------------------------------------------------------------------------------------    Chief complaint: left breast mastitis    HPI: This is a 43 y.o. female with a persistent worsening mastitis of the left breast which is been present for about 2 weeks.  She was started on oral outpatient antibiotics but has not noticed any improvement.  She was nursing her 2-year-old daughter up until the onset of the mastitis but once it set in she was not able to nurse anymore.  An ultrasound has been done showing no abscess.  She is being admitted to the hospital for IV antibiotic therapy.  Her family history is positive for both grandmothers having breast cancer.  Her mother does not have breast cancer  Patient had a baseline mammogram about 3 years ago and there were no concerning findings per the patient's report.    Past medical history:  No significant past medical history    Past Surgical History:   Procedure Laterality Date   • GYN SURGERY             Current Facility-Administered Medications   Medication Dose Route Frequency Provider Last Rate Last Dose   • senna-docusate (PERICOLACE or SENOKOT S) 8.6-50 MG per tablet 2 Tab  2 Tab Oral BID Kael Umanzor M.D.        And   • polyethylene glycol/lytes (MIRALAX) PACKET 1 Packet  1 Packet Oral QDAY PRN Kael Umanzor M.D.        And   • magnesium hydroxide (MILK OF MAGNESIA) suspension 30 mL  30 mL Oral QDAY PRN Kael Umanzor M.D.        And   • bisacodyl (DULCOLAX) suppository 10 mg  10 mg Rectal QDAY PRN Kael Umanzor M.D.       • Respiratory Care per Protocol   Nebulization Continuous RT Kael Umanzor M.D.       • acetaminophen (TYLENOL) tablet 650 mg  650 mg Oral Q6HRS PRN Kael Umanzor M.D.   650 mg at 18 1806   • MD Alert...Vancomycin per Pharmacy   Other pharmacy to dose  "Kael Umanzor M.D.       • oxyCODONE immediate-release (ROXICODONE) tablet 5 mg  5 mg Oral Q6HRS PRN Baylee Sandhu M.D.       • ibuprofen (MOTRIN) tablet 600 mg  600 mg Oral Q6HRS PRN Baylee Sandhu M.D.       • diphenhydrAMINE (BENADRYL) tablet/capsule 25 mg  25 mg Oral Q8HRS PRN Baylee Sandhu M.D.       • Influenza Vaccine Quad pf injection 0.5 mL  0.5 mL Intramuscular Once PRN Bulmaro Norman M.D.       • NS infusion   Intravenous Continuous Bulmaro Norman M.D. 200 mL/hr at 11/02/18 2027     • NS (BOLUS) infusion 2,000 mL  2,000 mL Intravenous Once Bulmaro Norman M.D.           Social History     Social History   • Marital status:      Spouse name: N/A   • Number of children: N/A   • Years of education: N/A     Occupational History   • Not on file.     Social History Main Topics   • Smoking status: Never Smoker   • Smokeless tobacco: Never Used   • Alcohol use No   • Drug use: No   • Sexual activity: Not on file     Other Topics Concern   • Not on file     Social History Narrative   • No narrative on file       History reviewed. No pertinent family history.    Allergies:  Amoxicillin; Clindamycin; Penicillins; Septra [sulfamethoxazole w-trimethoprim]; and Sulfa drugs    Review of Systems:  Noncontributory except as per HPI    Physical Exam:  Blood pressure 110/73, pulse 77, temperature 37 °C (98.6 °F), resp. rate 16, height 1.6 m (5' 3\"), weight 59.9 kg (132 lb 0.9 oz), last menstrual period 10/29/2018, SpO2 97 %, currently breastfeeding.    Constitutional: Alert, oriented, no acute distress  HEENT:  Normocephalic and atraumatic, EOMI  Neck:   Supple, no JVD,   Cardiovascular: Regular rate and rhythm,   Pulmonary:  Good air entry bilaterally,   Breast exam:  Erythema and induration of the left upper outer quadrant of the left breast     No discrete fluctuance to suggest abscess     No nipple drainage     No evidence of left axillary lymphadenopathy     Right breast is " unremarkable  Abdominal:  Soft,  Non-distended  Musculoskeletal: No edema, no tenderness  Neurological:  CN II-XII grossly intact, no focal deficits  Skin:   Skin is warm and dry. No rash noted.  Psychiatric:  Normal mood and affect.    Labs:  Recent Labs      11/02/18   1120   WBC  9.3   RBC  3.90*   HEMOGLOBIN  11.8*   HEMATOCRIT  36.4*   MCV  93.3   MCH  30.3   MCHC  32.4*   RDW  44.2   PLATELETCT  546*   MPV  8.9*     Recent Labs      11/02/18   1120   SODIUM  139   POTASSIUM  3.9   CHLORIDE  106   CO2  26   GLUCOSE  90   BUN  14   CREATININE  0.74   CALCIUM  9.0         Recent Labs      11/02/18   1120   ASTSGOT  14   ALTSGPT  11   TBILIRUBIN  0.4   ALKPHOSPHAT  59   GLOBULIN  3.7*       Radiology:  Ultrasound: No evidence of left breast abscess  CT: Pending    Assessment: This is a 43 y.o. female with a persistent left breast mastitis.  A discrete abscess is not been identified.    Plan:  Patient will be started on empiric IV antibiotics to help calm the mastitis.  There is a CT scan pending to further evaluate for possible underlying abscess.  If we do not identify an abscess and if the mastitis does not improve with IV antibiotics then consideration for possible inflammatory breast cancer needs to be considered although I think the likelihood of that being present is very low.  We will see how she responds to the treatment for the mastitis and then further testing and intervention will be determined at that point.    Mikey Louie M.D.  [facility]  [phone]  Cell: [phone]

## 2018-11-03 NOTE — ASSESSMENT & PLAN NOTE
Normocytic, improving and the patient has been asymptomatic  Normal TSH, LDH, iron panel, ferritin and reticulocyte count    Follow-up on repeat CBC at PCP follow-up in 3-5 days

## 2018-11-03 NOTE — CARE PLAN
Problem: Communication  Goal: The ability to communicate needs accurately and effectively will improve  Outcome: PROGRESSING AS EXPECTED  Pt updated on plan of care    Problem: Safety  Goal: Will remain free from injury  Outcome: PROGRESSING AS EXPECTED  Pt instructed to use call light, fall education provided

## 2018-11-03 NOTE — ASSESSMENT & PLAN NOTE
Started 2 weeks ago with fever, failed outpatient antibiotic treatment with keflex and clindamycin.  Afebrile and without leukocytosis since admission  11/2/18 blood cx negative x 2, cx of L breast discharge from 11/4/18 showed some WBCs but no organisms  11/2/18 chest US and CT showed no evidence of an abscess  Clinical presentation and workup most suggestive of acute mastitis, abscess unlikely, inflammatory breast cancer less likely but still a consideration     The patient has shown clinical improvement on PO Linezolid  Per inpatient ID and Surgery consults, okay to discharge patient home on PO linezolid with close outpatient follow-up  PCP follow up in 3-5 days  Outpatient surgery follow up in 2 weeks  Patient advised to return to the ED if her symptoms do not improve in the next few days or worsen   Surgical intervention versus screening mammogram to rule out malinancy per patient's clinical course

## 2018-11-03 NOTE — PROGRESS NOTES
"Pharmacy Kinetics 43 y.o. female on vancomycin day # 2  11/3/2018    Currently on Vancomycin 1000 mg iv q12hr    Indication for Treatment: SSTI/Mastitis     Pertinent history per medical record: Admitted on 2018 for left mastitis.  Patient is currently breastfeeding and had noted swelling and erythema extending from her L armpit to breast.  She was started on oral antibiotics as an outpatient but had worsening symptoms and admitted to the hospital for IV antibiotics and surgical consultation.  US was negative for abscess formation. CT scan did not show evidence of abscess.      Other antibiotics: None    Allergies: Amoxicillin; Clindamycin; Penicillins; Septra [sulfamethoxazole w-trimethoprim]; and Sulfa drugs     List concerns for renal function: CT scan with contrast     Pertinent cultures to date:   None to date    Recent Labs      18   1120  18   0232   WBC  9.3  8.6   NEUTSPOLYS  61.30  55.20     Recent Labs      18   1120  18   0232   BUN  14  12   CREATININE  0.74  0.71   ALBUMIN  3.8  2.9*     No results for input(s): VANCOTROUGH, VANCOPEAK, VANCORANDOM in the last 72 hours.  Intake/Output Summary (Last 24 hours) at 18 1454  Last data filed at 18 0509   Gross per 24 hour   Intake             3748 ml   Output                0 ml   Net             3748 ml      Blood pressure 108/58, pulse 72, temperature 37 °C (98.6 °F), resp. rate 16, height 1.6 m (5' 3\"), weight 59.9 kg (132 lb 0.9 oz), last menstrual period 10/29/2018, SpO2 96 %, currently breastfeeding. Temp (24hrs), Av.2 °C (98.9 °F), Min:36.7 °C (98.1 °F), Max:37.8 °C (100.1 °F)      A/P   1. Vancomycin dose change: No  2. Next vancomycin level: 0330 on   3. Goal trough: 12-16 mcg/ml  4. Comments: Pt started vanco maintenance dosing this morning. Will check a vanco level prior to the 4th total dose tomorrow morning. CT scan did not show evidence of abscess. Continue current vanco dosing.    Shay " Rod, PharmD

## 2018-11-04 ENCOUNTER — APPOINTMENT (OUTPATIENT)
Dept: RADIOLOGY | Facility: MEDICAL CENTER | Age: 43
DRG: 600 | End: 2018-11-04
Attending: STUDENT IN AN ORGANIZED HEALTH CARE EDUCATION/TRAINING PROGRAM
Payer: MEDICAID

## 2018-11-04 PROBLEM — M79.89 LEFT ARM SWELLING: Status: ACTIVE | Noted: 2018-11-04

## 2018-11-04 PROBLEM — R60.1 GENERALIZED EDEMA: Status: ACTIVE | Noted: 2018-11-04

## 2018-11-04 LAB
ALBUMIN SERPL BCP-MCNC: 2.7 G/DL (ref 3.2–4.9)
ALBUMIN/GLOB SERPL: 0.9 G/DL
ALP SERPL-CCNC: 40 U/L (ref 30–99)
ALT SERPL-CCNC: 8 U/L (ref 2–50)
ANION GAP SERPL CALC-SCNC: 6 MMOL/L (ref 0–11.9)
AST SERPL-CCNC: 11 U/L (ref 12–45)
BASOPHILS # BLD AUTO: 2.2 % (ref 0–1.8)
BASOPHILS # BLD: 0.14 K/UL (ref 0–0.12)
BILIRUB SERPL-MCNC: 0.4 MG/DL (ref 0.1–1.5)
BUN SERPL-MCNC: 8 MG/DL (ref 8–22)
CALCIUM SERPL-MCNC: 7.8 MG/DL (ref 8.5–10.5)
CHLORIDE SERPL-SCNC: 112 MMOL/L (ref 96–112)
CO2 SERPL-SCNC: 21 MMOL/L (ref 20–33)
CREAT SERPL-MCNC: 0.54 MG/DL (ref 0.5–1.4)
EOSINOPHIL # BLD AUTO: 0.26 K/UL (ref 0–0.51)
EOSINOPHIL NFR BLD: 4.1 % (ref 0–6.9)
ERYTHROCYTE [DISTWIDTH] IN BLOOD BY AUTOMATED COUNT: 44 FL (ref 35.9–50)
GLOBULIN SER CALC-MCNC: 2.9 G/DL (ref 1.9–3.5)
GLUCOSE SERPL-MCNC: 99 MG/DL (ref 65–99)
HCT VFR BLD AUTO: 30.7 % (ref 37–47)
HGB BLD-MCNC: 9.8 G/DL (ref 12–16)
HGB RETIC QN AUTO: 30.3 PG/CELL (ref 29–35)
IMM GRANULOCYTES # BLD AUTO: 0.02 K/UL (ref 0–0.11)
IMM GRANULOCYTES NFR BLD AUTO: 0.3 % (ref 0–0.9)
IMM RETICS NFR: 10.6 % (ref 9.3–17.4)
LYMPHOCYTES # BLD AUTO: 2.75 K/UL (ref 1–4.8)
LYMPHOCYTES NFR BLD: 43 % (ref 22–41)
MCH RBC QN AUTO: 30.2 PG (ref 27–33)
MCHC RBC AUTO-ENTMCNC: 31.9 G/DL (ref 33.6–35)
MCV RBC AUTO: 94.5 FL (ref 81.4–97.8)
MONOCYTES # BLD AUTO: 0.52 K/UL (ref 0–0.85)
MONOCYTES NFR BLD AUTO: 8.1 % (ref 0–13.4)
NEUTROPHILS # BLD AUTO: 2.7 K/UL (ref 2–7.15)
NEUTROPHILS NFR BLD: 42.3 % (ref 44–72)
NRBC # BLD AUTO: 0 K/UL
NRBC BLD-RTO: 0 /100 WBC
PLATELET # BLD AUTO: 489 K/UL (ref 164–446)
PMV BLD AUTO: 9 FL (ref 9–12.9)
POTASSIUM SERPL-SCNC: 3.7 MMOL/L (ref 3.6–5.5)
PROT SERPL-MCNC: 5.6 G/DL (ref 6–8.2)
RBC # BLD AUTO: 3.25 M/UL (ref 4.2–5.4)
RETICS # AUTO: 0.05 M/UL (ref 0.04–0.06)
RETICS/RBC NFR: 1.5 % (ref 0.8–2.1)
SODIUM SERPL-SCNC: 139 MMOL/L (ref 135–145)
VANCOMYCIN TROUGH SERPL-MCNC: 9.1 UG/ML (ref 10–20)
WBC # BLD AUTO: 6.4 K/UL (ref 4.8–10.8)

## 2018-11-04 PROCEDURE — A9270 NON-COVERED ITEM OR SERVICE: HCPCS | Performed by: INTERNAL MEDICINE

## 2018-11-04 PROCEDURE — 700102 HCHG RX REV CODE 250 W/ 637 OVERRIDE(OP): Performed by: STUDENT IN AN ORGANIZED HEALTH CARE EDUCATION/TRAINING PROGRAM

## 2018-11-04 PROCEDURE — 700102 HCHG RX REV CODE 250 W/ 637 OVERRIDE(OP): Performed by: INTERNAL MEDICINE

## 2018-11-04 PROCEDURE — 85025 COMPLETE CBC W/AUTO DIFF WBC: CPT

## 2018-11-04 PROCEDURE — 93971 EXTREMITY STUDY: CPT | Mod: LT

## 2018-11-04 PROCEDURE — 87070 CULTURE OTHR SPECIMN AEROBIC: CPT

## 2018-11-04 PROCEDURE — 770006 HCHG ROOM/CARE - MED/SURG/GYN SEMI*

## 2018-11-04 PROCEDURE — 80053 COMPREHEN METABOLIC PANEL: CPT

## 2018-11-04 PROCEDURE — 99255 IP/OBS CONSLTJ NEW/EST HI 80: CPT | Performed by: INTERNAL MEDICINE

## 2018-11-04 PROCEDURE — 700105 HCHG RX REV CODE 258: Performed by: STUDENT IN AN ORGANIZED HEALTH CARE EDUCATION/TRAINING PROGRAM

## 2018-11-04 PROCEDURE — 71045 X-RAY EXAM CHEST 1 VIEW: CPT

## 2018-11-04 PROCEDURE — A9270 NON-COVERED ITEM OR SERVICE: HCPCS | Performed by: STUDENT IN AN ORGANIZED HEALTH CARE EDUCATION/TRAINING PROGRAM

## 2018-11-04 PROCEDURE — 85046 RETICYTE/HGB CONCENTRATE: CPT

## 2018-11-04 PROCEDURE — 36415 COLL VENOUS BLD VENIPUNCTURE: CPT

## 2018-11-04 PROCEDURE — 99232 SBSQ HOSP IP/OBS MODERATE 35: CPT | Mod: GC | Performed by: INTERNAL MEDICINE

## 2018-11-04 PROCEDURE — 80202 ASSAY OF VANCOMYCIN: CPT

## 2018-11-04 PROCEDURE — 87205 SMEAR GRAM STAIN: CPT

## 2018-11-04 PROCEDURE — 700111 HCHG RX REV CODE 636 W/ 250 OVERRIDE (IP): Performed by: STUDENT IN AN ORGANIZED HEALTH CARE EDUCATION/TRAINING PROGRAM

## 2018-11-04 RX ORDER — LINEZOLID 600 MG/1
600 TABLET, FILM COATED ORAL EVERY 12 HOURS
Status: DISCONTINUED | OUTPATIENT
Start: 2018-11-04 | End: 2018-11-06 | Stop reason: HOSPADM

## 2018-11-04 RX ORDER — FERROUS SULFATE 325(65) MG
325 TABLET ORAL
Status: DISCONTINUED | OUTPATIENT
Start: 2018-11-04 | End: 2018-11-06 | Stop reason: HOSPADM

## 2018-11-04 RX ADMIN — ACETAMINOPHEN 650 MG: 325 TABLET, FILM COATED ORAL at 04:47

## 2018-11-04 RX ADMIN — ACETAMINOPHEN 650 MG: 325 TABLET, FILM COATED ORAL at 14:03

## 2018-11-04 RX ADMIN — FERROUS SULFATE TAB 325 MG (65 MG ELEMENTAL FE) 325 MG: 325 (65 FE) TAB at 11:45

## 2018-11-04 RX ADMIN — SODIUM CHLORIDE: 9 INJECTION, SOLUTION INTRAVENOUS at 04:47

## 2018-11-04 RX ADMIN — ENOXAPARIN SODIUM 40 MG: 100 INJECTION SUBCUTANEOUS at 18:29

## 2018-11-04 RX ADMIN — LINEZOLID 600 MG: 600 TABLET, FILM COATED ORAL at 22:07

## 2018-11-04 RX ADMIN — LINEZOLID 600 MG: 600 TABLET, FILM COATED ORAL at 11:45

## 2018-11-04 RX ADMIN — ACETAMINOPHEN 650 MG: 325 TABLET, FILM COATED ORAL at 22:06

## 2018-11-04 RX ADMIN — VANCOMYCIN HYDROCHLORIDE 1000 MG: 100 INJECTION, POWDER, LYOPHILIZED, FOR SOLUTION INTRAVENOUS at 04:47

## 2018-11-04 ASSESSMENT — ENCOUNTER SYMPTOMS
DIZZINESS: 0
PALPITATIONS: 0
EYE PAIN: 0
HEADACHES: 0
MYALGIAS: 0
FEVER: 0
NECK PAIN: 0
BLURRED VISION: 0
COUGH: 0
CHILLS: 0
HEMOPTYSIS: 0
NAUSEA: 0
HEARTBURN: 0

## 2018-11-04 ASSESSMENT — PAIN SCALES - GENERAL
PAINLEVEL_OUTOF10: 0
PAINLEVEL_OUTOF10: 2
PAINLEVEL_OUTOF10: 3
PAINLEVEL_OUTOF10: 1

## 2018-11-04 NOTE — ASSESSMENT & PLAN NOTE
"Patient reported to feel \"puffy\" 11/4/2018 morning. X-Ray shows no acute cardiopulmonary disease.  - IVF stopped  "

## 2018-11-04 NOTE — CARE PLAN
Problem: Bowel/Gastric:  Goal: Normal bowel function is maintained or improved  Outcome: PROGRESSING AS EXPECTED  + flatus  + bowel movement 11/3.  Pt reports bowel function is within normal pattern.     Problem: Pain Management  Goal: Pain level will decrease to patient's comfort goal  Outcome: PROGRESSING AS EXPECTED  Pt reports intermittent headache that is relieved by tylenol.

## 2018-11-04 NOTE — CARE PLAN
Problem: Infection  Goal: Will remain free from infection  Outcome: PROGRESSING AS EXPECTED  Patient will remain free from further infection and current infection will improve during shift.    Problem: Venous Thromboembolism (VTW)/Deep Vein Thrombosis (DVT) Prevention:  Goal: Patient will participate in Venous Thrombosis (VTE)/Deep Vein Thrombosis (DVT)Prevention Measures  Outcome: PROGRESSING AS EXPECTED  Patient will not develop DVTs during shift by ambulating around unit.    Problem: Pain Management  Goal: Pain level will decrease to patient's comfort goal  Outcome: PROGRESSING AS EXPECTED  Patient will maintain comfort at rest during shift.

## 2018-11-04 NOTE — PROGRESS NOTES
Received report from Telemetry RN and pt arrived on our unit. Pt is AOx4 and has no complaints of pain, nausea, or dizziness. Pt is tolerating regular diet adequately. Pt is voiding adequately in the restroom and last BM was 11/3. Pt mobility status is independent.   RN Reviewed plan of care with patient, bed in lowest position and locked, pt resting comfortably now, call light within reach, all needs met at this time

## 2018-11-04 NOTE — CARE PLAN
Problem: Safety  Goal: Will remain free from injury    Intervention: Provide assistance with mobility   11/03/18 2301   OTHER   Assistance / Tolerance No assistance required   Educated pt on the use of using her call light when not feeling well, complaints of pain, dizziness, or chest pain.      Problem: Pain Management  Goal: Pain level will decrease to patient's comfort goal    Intervention: Educate and implement non-pharmacologic comfort measures. Examples: relaxation, distration, play therapy, activity therapy, massage, etc.   11/03/18 2436   OTHER   Intervention Declines   Educated on the use of ice packs

## 2018-11-04 NOTE — CONSULTS
DATE OF SERVICE:  11/04/2018    INFECTIOUS DISEASE CONSULTATION    REFERRING PHYSICIAN:  [facility].    REASON FOR CONSULTATION:  Mastitis of the left breast.    HISTORY OF PRESENT ILLNESS:  The patient is a 43-year-old  female who has   no significant past medical history and had a child 2 years ago.  She has   been breast feeding.  She was in the process of bleeding.  About 2 weeks ago,   she developed a lump in her left axilla, which subsequently spread as redness   to her left breast.  It became hard and she had slight fevers and chills.  She   was given clindamycin by her OB, but it did not resolve.  She was   subsequently given Keflex, but that did not help as well.  Hence, she was sent   to the emergency room.  Since she has been here, she has remained mostly   afebrile, except one low grade fever up to 100.1.  She has had no   leukocytosis.  Ultrasound of the chest does not show any abscess, but shows   some edema.  The CAT scan of the chest is consistent with enlargement of the   left breast tissue without focal enhancing abscess and with skin thickening   and underlying subcutaneous fat strandings.  There is also associated left   axillary adenopathy.  She was started on vancomycin, but she continues to have   significant erythema of the left breast in spite of the antibiotics.  In view   of that, she was also evaluated by surgery in case this is an inflammatory   breast cancer and infectious disease was called for antibiotics.    REVIEW OF SYSTEMS:  The patient had fevers and chills at that time, denies any   currently.  She has swelling, redness and induration of her left breast.  No   cough.  No shortness of breath.  No chest pain, no nausea, vomiting, diarrhea.    Other review of systems is negative per AMA and CMS criteria.    ALLERGIES:  SHE HAS MULTIPLE ALLERGIES INCLUDING AMOXICILLIN, CLINDAMYCIN,   PENICILLIN, AND SEPTRA.    PAST MEDICAL HISTORY:  Nil.    PAST SURGICAL HISTORY:   .    SOCIAL HISTORY:  Does not smoke, does not drink.    FAMILY HISTORY:  She has breast cancer history in both her grandmothers.    PHYSICAL EXAMINATION:  GENERAL:  The patient is nontoxic, in no acute distress.  VITAL SIGNS:  T-max is 99.8, blood pressure is 108/74, pulse is 78.  HEAD AND ENT:  Mucosa is moist.  No oral thrush.  NECK:  Supple.  No lymphadenopathy.  PULMONARY:  Bilateral air entry, clear to auscultation.  CARDIOVASCULAR:  Regular.  No murmurs or gallops heard.  ABDOMEN:  Soft, nontender.  EXTREMITIES:  No edema.  CENTRAL NERVOUS SYSTEM:  Alert and oriented x3.  No focal neurological   deficit.  BREASTS:  Left breast is erythematous.  There is induration.  Slightly   engorged.  Mild left axillary adenopathy palpated.    ASSESSMENT:  1.  Left breast mastitis.  2.  Multiple allergies.  3.  Family history of breast cancer.    RECOMMENDATION:  At this time, I would recommend to discontinue the   vancomycin.  Start her on Zyvox.  The patient has already stopped   breastfeeding.  If she fails to improve on 2 days of Zyvox, I would recommend   getting a biopsy to rule out inflammatory breast cancer.  We will also try to   culture her breast milk if she is still producing some.  I have reviewed all   the records.  Plan was discussed with internal medicine.    Thanks for the consultation.       ____________________________________     AMISHA STEIN MD JD / NEHEMIAS    DD:  2018 10:48:37  DT:  2018 11:41:19    D#:  [id number]  Job#:  [id number]

## 2018-11-04 NOTE — PROGRESS NOTES
Two RN skin check complete.     Redness and swelling noted to Left breast.   Skin over bony prominences remains intact.

## 2018-11-04 NOTE — PROGRESS NOTES
Bedside report received.  Assessment complete.  A&O x 4. Patient calls appropriately.  Patient up with no assist.    Patient has 3/10 pain. Ice packs in place- pt states tylenol works best, discussed next available dose with patient.   Denies N&V. Pt is currently NPO sips with meds.  positive void, positive flatus  Patient denies SOB.  Patient resting in bed.  Review plan with of care with patient. Call light and personal belongings with in reach. Hourly rounding in place. All needs met at this time.

## 2018-11-04 NOTE — PROGRESS NOTES
Internal Medicine Interval Note  Note Author: Kael Umanzor M.D.     Name Sofia Viramontes       1975   Age/Sex 43 y.o. female   MRN [id number]   Code Status Full     After 5PM or if no immediate response to page, please call for cross-coverage  Attending/Team: Dr. Norman / Malcolm See Patient List for primary contact information  Call [phone] to page    1st Call - Day Intern (R1):   Dr. Umanzor 2nd Call - Day Sr. Resident (R2/R3):   Dr. Sandhu     Reason for interval visit  (Principal Problem)   Likely mastitis    Interval Problem Daily Status Update  (24 hours, problem oriented, brief subjective history, new lab/imaging data pertinent to that problem)   She states there was no improvement overnight. The extent of redness is present in the same location if it is not worse. No fevers, chills, or night sweats overnight. She also had episode of feeling puffy, but denies any shortness of breath.    Review of Systems   Constitutional: Negative for chills and fever.   HENT: Negative for congestion and ear pain.    Eyes: Negative for blurred vision and pain.   Respiratory: Negative for cough and hemoptysis.    Cardiovascular: Negative for chest pain and palpitations.   Gastrointestinal: Negative for heartburn and nausea.   Genitourinary: Negative for dysuria and urgency.   Musculoskeletal: Negative for myalgias and neck pain.   Skin: Negative for itching and rash.   Neurological: Negative for dizziness and headaches.     Disposition/Barriers to discharge:   None    Consultants/Specialty  Surgery  PCP: Pcp Pt States None    Quality Measures  Quality-Core Measures   Reviewed items::  Labs reviewed, Medications reviewed, EKG reviewed and Radiology images reviewed  Mercedes catheter::  No Mercedes  DVT prophylaxis pharmacological::  Enoxaparin (Lovenox)  DVT prophylaxis - mechanical:  SCDs    Physical Exam       Vitals:    18 1707 18 2016 18 2258 18 0314   BP: 113/81 110/78 104/77  112/73   Pulse: 69 65 77 94   Resp: 16 18 18 18   Temp: 37.4 °C (99.4 °F) 36.9 °C (98.5 °F) 36.2 °C (97.2 °F) 37.7 °C (99.8 °F)   TempSrc:       SpO2: 97% 93% 96% 96%   Weight:  64.9 kg (143 lb 1.3 oz)     Height:         Body mass index is 25.35 kg/m². Weight: 64.9 kg (143 lb 1.3 oz)  Oxygen Therapy:  Pulse Oximetry: 96 %, O2 (LPM): 0, O2 Delivery: None (Room Air)    Physical Exam   Constitutional: She is well-developed, well-nourished, and in no distress. No distress.   HENT:   Head: Normocephalic and atraumatic.   Eyes: Conjunctivae are normal. Right eye exhibits no discharge. Left eye exhibits no discharge.   Neck: Normal range of motion. Neck supple. No tracheal deviation present. No thyromegaly present.   Cardiovascular: Normal rate, regular rhythm, normal heart sounds and intact distal pulses.  Exam reveals no gallop and no friction rub.    No murmur heard.  Pulmonary/Chest: Effort normal and breath sounds normal. No respiratory distress. She has no wheezes. She has no rales. She exhibits no tenderness.   Abdominal: Soft. Bowel sounds are normal. She exhibits no distension and no mass. There is no tenderness. There is no rebound and no guarding.   Musculoskeletal: Normal range of motion.   Neurological: She is alert. She exhibits normal muscle tone. Coordination normal.   Skin: Skin is warm. She is not diaphoretic. There is erythema.   Erythema on her left breast, unchanged, continues to be significantly swollen, tender and erythematous spreading outwards from nipple     Assessment/Plan     Acute mastitis of left breast- (present on admission)   Assessment & Plan    NO IMPROVEMENT  Started 2 weeks ago. Failed outpatient antibiotic treatment: keflex, clindamycin. Fevers+. Ultrasound shows no abscess.  Most likely acute mastitis less likely inflammatory breast cancer, plugged duct, abscess. Procal WNL. No leukocytosis. CT scan shows no abscess but likely mastoiditis and skin thickening most likely due to  "cellulitis but inflammatory carcinoma cannot be excluded.  - blood cultures NGTD  - vancomycin  - general surgery: appreciate recs  - It is likely an infection and patient is on appropriate antibiotics for common organisms, so we will call ID to see if we are missing a coverage for less common organisms that could cause this presentation     Generalized edema   Assessment & Plan    Patient reported to feel \"puffy\" 11/4/2018 morning. X-Ray shows no acute cardiopulmonary disease.  - IVF stopped     Thrombocytosis (HCC)- (present on admission)   Assessment & Plan    Likely reactive,CTM     Normocytic anemia- (present on admission)   Assessment & Plan    Hyperproliferation, low iron storage. Asymptomatic. Possibly due to blood loss or chronic inflammation. TSH, LDH, Iron studies WNL. Ferritin 104. B12. Folate.     Chest pain- (present on admission)   Assessment & Plan    New onset today. Mild. Worse with laying down. Diffuse non-significant ST elevations ok EKG. Troponin negative. Echo is WNL.         "

## 2018-11-05 ENCOUNTER — APPOINTMENT (OUTPATIENT)
Dept: RADIOLOGY | Facility: MEDICAL CENTER | Age: 43
DRG: 600 | End: 2018-11-05
Attending: STUDENT IN AN ORGANIZED HEALTH CARE EDUCATION/TRAINING PROGRAM
Payer: MEDICAID

## 2018-11-05 ENCOUNTER — PATIENT OUTREACH (OUTPATIENT)
Dept: HEALTH INFORMATION MANAGEMENT | Facility: OTHER | Age: 43
End: 2018-11-05

## 2018-11-05 PROBLEM — R07.9 CHEST PAIN: Status: RESOLVED | Noted: 2018-11-02 | Resolved: 2018-11-05

## 2018-11-05 PROBLEM — R60.1 GENERALIZED EDEMA: Status: RESOLVED | Noted: 2018-11-04 | Resolved: 2018-11-05

## 2018-11-05 LAB
ANION GAP SERPL CALC-SCNC: 6 MMOL/L (ref 0–11.9)
BASOPHILS # BLD AUTO: 2.4 % (ref 0–1.8)
BASOPHILS # BLD: 0.13 K/UL (ref 0–0.12)
BUN SERPL-MCNC: 7 MG/DL (ref 8–22)
CALCIUM SERPL-MCNC: 8.5 MG/DL (ref 8.5–10.5)
CHLORIDE SERPL-SCNC: 106 MMOL/L (ref 96–112)
CO2 SERPL-SCNC: 24 MMOL/L (ref 20–33)
CREAT SERPL-MCNC: 0.71 MG/DL (ref 0.5–1.4)
EOSINOPHIL # BLD AUTO: 0.25 K/UL (ref 0–0.51)
EOSINOPHIL NFR BLD: 4.7 % (ref 0–6.9)
ERYTHROCYTE [DISTWIDTH] IN BLOOD BY AUTOMATED COUNT: 42.3 FL (ref 35.9–50)
GLUCOSE SERPL-MCNC: 90 MG/DL (ref 65–99)
GRAM STN SPEC: NORMAL
HCT VFR BLD AUTO: 32.8 % (ref 37–47)
HGB BLD-MCNC: 10.9 G/DL (ref 12–16)
IMM GRANULOCYTES # BLD AUTO: 0.02 K/UL (ref 0–0.11)
IMM GRANULOCYTES NFR BLD AUTO: 0.4 % (ref 0–0.9)
LYMPHOCYTES # BLD AUTO: 2.43 K/UL (ref 1–4.8)
LYMPHOCYTES NFR BLD: 45.4 % (ref 22–41)
MCH RBC QN AUTO: 30.8 PG (ref 27–33)
MCHC RBC AUTO-ENTMCNC: 33.2 G/DL (ref 33.6–35)
MCV RBC AUTO: 92.7 FL (ref 81.4–97.8)
MONOCYTES # BLD AUTO: 0.46 K/UL (ref 0–0.85)
MONOCYTES NFR BLD AUTO: 8.6 % (ref 0–13.4)
NEUTROPHILS # BLD AUTO: 2.06 K/UL (ref 2–7.15)
NEUTROPHILS NFR BLD: 38.5 % (ref 44–72)
NRBC # BLD AUTO: 0 K/UL
NRBC BLD-RTO: 0 /100 WBC
PLATELET # BLD AUTO: 511 K/UL (ref 164–446)
PMV BLD AUTO: 9.1 FL (ref 9–12.9)
POTASSIUM SERPL-SCNC: 3.9 MMOL/L (ref 3.6–5.5)
RBC # BLD AUTO: 3.54 M/UL (ref 4.2–5.4)
SIGNIFICANT IND 70042: NORMAL
SITE SITE: NORMAL
SODIUM SERPL-SCNC: 136 MMOL/L (ref 135–145)
SOURCE SOURCE: NORMAL
WBC # BLD AUTO: 5.4 K/UL (ref 4.8–10.8)

## 2018-11-05 PROCEDURE — 700102 HCHG RX REV CODE 250 W/ 637 OVERRIDE(OP): Performed by: INTERNAL MEDICINE

## 2018-11-05 PROCEDURE — 36415 COLL VENOUS BLD VENIPUNCTURE: CPT

## 2018-11-05 PROCEDURE — 700111 HCHG RX REV CODE 636 W/ 250 OVERRIDE (IP): Performed by: STUDENT IN AN ORGANIZED HEALTH CARE EDUCATION/TRAINING PROGRAM

## 2018-11-05 PROCEDURE — A9270 NON-COVERED ITEM OR SERVICE: HCPCS | Performed by: INTERNAL MEDICINE

## 2018-11-05 PROCEDURE — 85025 COMPLETE CBC W/AUTO DIFF WBC: CPT

## 2018-11-05 PROCEDURE — 770006 HCHG ROOM/CARE - MED/SURG/GYN SEMI*

## 2018-11-05 PROCEDURE — 80048 BASIC METABOLIC PNL TOTAL CA: CPT

## 2018-11-05 PROCEDURE — 99239 HOSP IP/OBS DSCHRG MGMT >30: CPT | Mod: GC | Performed by: INTERNAL MEDICINE

## 2018-11-05 PROCEDURE — 700102 HCHG RX REV CODE 250 W/ 637 OVERRIDE(OP): Performed by: STUDENT IN AN ORGANIZED HEALTH CARE EDUCATION/TRAINING PROGRAM

## 2018-11-05 PROCEDURE — A9270 NON-COVERED ITEM OR SERVICE: HCPCS | Performed by: STUDENT IN AN ORGANIZED HEALTH CARE EDUCATION/TRAINING PROGRAM

## 2018-11-05 PROCEDURE — 99232 SBSQ HOSP IP/OBS MODERATE 35: CPT | Performed by: INTERNAL MEDICINE

## 2018-11-05 RX ORDER — LINEZOLID 600 MG/1
600 TABLET, FILM COATED ORAL EVERY 12 HOURS
Qty: 20 TAB | Refills: 0 | Status: SHIPPED | OUTPATIENT
Start: 2018-11-05 | End: 2018-11-16 | Stop reason: SDUPTHER

## 2018-11-05 RX ORDER — FERROUS SULFATE 325(65) MG
325 TABLET ORAL
Qty: 60 TAB | Refills: 1 | Status: SHIPPED | OUTPATIENT
Start: 2018-11-06 | End: 2018-12-19

## 2018-11-05 RX ADMIN — ENOXAPARIN SODIUM 40 MG: 100 INJECTION SUBCUTANEOUS at 16:59

## 2018-11-05 RX ADMIN — FERROUS SULFATE TAB 325 MG (65 MG ELEMENTAL FE) 325 MG: 325 (65 FE) TAB at 09:15

## 2018-11-05 RX ADMIN — ACETAMINOPHEN 650 MG: 325 TABLET, FILM COATED ORAL at 10:13

## 2018-11-05 RX ADMIN — LINEZOLID 600 MG: 600 TABLET, FILM COATED ORAL at 16:58

## 2018-11-05 RX ADMIN — LINEZOLID 600 MG: 600 TABLET, FILM COATED ORAL at 09:15

## 2018-11-05 ASSESSMENT — ENCOUNTER SYMPTOMS
DIZZINESS: 0
SHORTNESS OF BREATH: 0
COUGH: 0
HEADACHES: 0
FEVER: 0
SORE THROAT: 0
CONSTIPATION: 0
HEMOPTYSIS: 0
PALPITATIONS: 0
CHILLS: 0
DIARRHEA: 0
BLURRED VISION: 0
HEARTBURN: 0
MYALGIAS: 0
NAUSEA: 0
FOCAL WEAKNESS: 0
BLOOD IN STOOL: 0
ABDOMINAL PAIN: 0
EYE PAIN: 0

## 2018-11-05 ASSESSMENT — PAIN SCALES - GENERAL
PAINLEVEL_OUTOF10: 0
PAINLEVEL_OUTOF10: 4

## 2018-11-05 NOTE — PROGRESS NOTES
Report received from night RN, assumed Care.   Patient is AOx4, responds appropriately.      L breast is large, edematous, hot, red.   States some pus comes out from nipple intermittently.  Culture sent last night.   Patient is curious for results of US of arm.  Patient states her stool is getting looser, however is not diarrhea, ordered yogurt.  Pain controlled at this time, declines headache.  Patient is tolerating regular diet, denies nausea/vomiting. + flatus  Up self with steady gait.    Plan of care discussed, all questions answered.      Call light and belongings within reach, treaded slipper socks on, bed in lowest locked position.  Hourly rounding in place, all needs met at this time

## 2018-11-05 NOTE — PROGRESS NOTES
Internal Medicine Interval Note  Note Author: Corinne Hewitt M.D.     Name Sofia Viramontes       1975   Age, Sex 43 y.o. female   MRN [id number]   Code Status Full     After 5PM or if no immediate response to page, please call for cross-coverage  Attending: Dr. Figueroa  Team: Blue See patient list for primary contact information  Call [phone] to page    1st Call - Day Intern (R1)  Dr. Hewitt 2nd Call - Day Sr. Resident (R2)  Dr. Sandhu     Principal Problem  Left breast mastitis    Interval Problem Daily Status Update  Reports improvement in left breast redness and tenderness following craniosacral therapy by her mother. Notes expression of some purulent material from the nipple following massage therapy that was sent to the microbiology lab for analysis.    Reports improvement in left arm swelling and pain, informed of the results of her upper extremity ultrasound and advised to use elevation and heat for symptomatic relief of superficial venous thrombi  Denies fevers, chills and night sweats overnight.      Culture of L breast discharge from 18 showed some WBCs, no organisms  Per ID consult, okay for patient to be discharged home on PO Linezolid with close PCP follow-up  Per surgery consult, okay for patient to be discharged home on PO antibiotics with 2-week outpatient surgery follow-up    Review of Systems   Constitutional: Negative for chills and fever.   HENT: Negative for congestion, ear pain and sore throat.    Eyes: Negative for blurred vision and pain.   Respiratory: Negative for cough, hemoptysis and shortness of breath.    Cardiovascular: Negative for chest pain and palpitations.   Gastrointestinal: Negative for abdominal pain, blood in stool, constipation, diarrhea, heartburn and nausea.   Genitourinary: Negative for dysuria and urgency.   Musculoskeletal: Negative for joint pain and myalgias.   Skin: Positive for itching and rash (improvement in inflammatory skin changes surrounding L  breast since prior exam).   Neurological: Negative for dizziness, focal weakness and headaches.   Psych: Negative for depression and substance abuse  Hematologic: Negative for abnormal bruising and bleeding    Disposition  Stable within her chronic conditions to be discharged home on oral linezolid with close PCP and 2-week outpatient surgery follow-up per ID and Surgery.    Inpatient Consultants  Surgery  Infectious disease    PCP  Dr. Juan Vizcarra    Quality-Core Measures   DVT prophylaxis: SCDs and Enoxaparin  Reviewed Items: Labs, EKG, Medications, Imaging    Vitals:    11/04/18 1635 11/04/18 1854 11/05/18 0326 11/05/18 0835   BP: 125/78 133/86 101/62 117/82   Pulse: 68 61 79 68   Resp: 16 16 16 16   Temp: 36.8 °C (98.2 °F) 36.5 °C (97.7 °F) 37.4 °C (99.4 °F) 36.9 °C (98.4 °F)   TempSrc:       SpO2: 97% 99% 96% 98%   Weight:       Height:         Physical Exam   General: well developed, well nourished, no apparent distress  HEN: normocephalic, atraumatic, nose and BL external ears intact   Eyes: EOMI, no scleral icterus, no conjunctival pallor   Throat: oropharynx clear, moist oral mucosa   Neck: supple, no thyromegaly, no lymphadenopathy    Left Breast: nontender, firm to palpation in L upper and lower quadrants, no fluctulence, normal nipple and areola, no active nipple discharge, improvement in erythema per the border outlined during yesterday's exam   Cardio: regular rate and rhythm, no murmurs, rubs or gallops   Pulm: normal work of breathing, clear to auscultation bilaterally  Gastro: soft, non-tender, no peritoneal signs   Extremities: no cyanosis, deformity or edema in BL upper and lower extremities   Neuro: alert, oriented x 4, CN 2-12 grossly intact, normal gait, no FNDs   Psych: normal mood, appropriate affect, normal concentration and judgment    Assessment and Plan    Chest pain  New onset today. Mild. Worse with laying down. Diffuse non-significant ST elevations ok EKG. Troponin negative. Echo is  "WNL.      Acute mastitis of left breast  Started 2 weeks ago with fever, failed outpatient antibiotic treatment with keflex and clindamycin.  Afebrile and without leukocytosis since admission  11/2/18 blood cx negative x 2, cx of L breast discharge from 11/4/18 showed some WBCs but no organisms  11/2/18 chest US and CT showed no evidence of an abscess  Clinical presentation and workup most suggestive of acute mastitis, abscess unlikely, inflammatory breast cancer less likely but still a consideration     The patient has shown clinical improvement on PO Linezolid  Per inpatient ID and Surgery consults, okay to discharge patient home on PO linezolid with close outpatient follow-up  PCP follow up in 3-5 days  Outpatient surgery follow up in 2 weeks  Patient advised to return to the ED if her symptoms do not improve in the next few days or worsen   Surgical intervention versus screening mammogram to rule out malinancy per patient's clinical course     Normocytic anemia  Normocytic, improving and the patient has been asymptomatic  Normal TSH, LDH, iron panel, ferritin and reticulocyte count    Follow-up on repeat CBC at PCP follow-up in 3-5 days    Thrombocytosis (HCC)  Likely reactive    Recommend repeat CBC prior to PCP follow-up in 3-5 days      Generalized edema  Patient reported to feel \"puffy\" 11/4/2018 morning. X-Ray shows no acute cardiopulmonary disease.  - IVF stopped    Left arm swelling  The patient complained of acute left UE swelling on 11/4/18, stat US showed acute superficial thrombus in the proximal forearm near the patient's IV site, but was negative for DVT  11/5/18 interview and exam significant for improvement in left UE swelling and discomfort    Advised the patient to continue with left UE elevation and heat therapy for symptomatic relief    "

## 2018-11-05 NOTE — CARE PLAN
Problem: Safety  Goal: Will remain free from falls  Outcome: PROGRESSING AS EXPECTED    Intervention: Assess risk factors for falls   11/05/18 0104   OTHER   Fall Risk Risk to Fall - 0 - 1 point   Risk for Injury-Any positive answers results in the pt being at high risk for fall related injury Not Applicable   Mobility Status Assessment 0-Ambulates & Transfers Independently. No Assistance Required   History of fall 0   Pt Calls for Assistance No assistance required     Intervention: Implement fall precautions   11/04/18 2000 11/05/18 0104   OTHER   Environmental Precautions Treaded Slipper Socks on Patient;Personal Belongings, Wastebasket, Call Bell etc. in Easy Reach;Transferred to Stronger Side;Report Given to Other Health Care Providers Regarding Fall Risk;Bed in Low Position;Communication Sign for Patients & Families;Mobility Assessed & Appropriate Sign Placed --    Bedrails --  Bedrails Closest to Bathroom Down         Problem: Venous Thromboembolism (VTW)/Deep Vein Thrombosis (DVT) Prevention:  Goal: Patient will participate in Venous Thrombosis (VTE)/Deep Vein Thrombosis (DVT)Prevention Measures  Outcome: PROGRESSING AS EXPECTED   11/04/18 2000   OTHER   Pharmacologic Prophylaxis Used LMWH: Enoxaparin(Lovenox)     Intervention: Encourage ambulation/mobilization at level directed by Physical Therapy in collaboration with Interdisciplinary Team  Pt ambulates frequently.

## 2018-11-05 NOTE — DISCHARGE SUMMARY
Internal Medicine Discharge Summary  Note Author: Corinne Hewitt M.D.       Admit Date:  11/2/2018       Discharge Date:   11/6/2018    Service:   [facility] Internal Medicine Blue Team  Attending Physician(s):   Dr. Norman/Henry       Senior Resident(s):   Dr. Sandhu  Rubens Resident(s):   Dr. Hewitt      Primary Diagnosis:   Acute mastitis of left breast       Secondary Diagnoses:                Active Problems:    Acute mastitis of left breast POA: Yes    Normocytic anemia POA: Yes    Thrombocytosis (HCC) POA: Yes    Left arm swelling POA: Unknown  Resolved Problems:    Generalized edema POA: Unknown    Chest pain POA: Yes      Hospital Summary (Brief Narrative):       Ms. Viramontes is a 43 y.o. female with no pertinent past medical history who presents for left breast pain. The pain had been present for approximately 2 weeks in duration prior to admission, with associated swelling and erythema of her breast. She complained of subjective chills and fever of 101.0 at home.  Patient was initially given clindamycin but then developed itching.  She was then given Keflex which provided minimal improvement. Does not plan to breast-feed after this episode.  She is noted to have multiple drug allergies including allergies to penicillin and sulfa.    In the ED, patient was noted to be afebrile and normotensive. Labs showed a normal leukocyte count, decreased Hb to 11.8, normal kidney and liver function. Blood cultures were drawn and found to be negative. Ultrasound of left breast showed no evidence of fluid collection. Physical exam showed some continued concern for abscess so CT w contrast was ordered which showed no evidence of abscess. Patient was started on vancomycin. Surgery was consulted and no acute intervention. ID was consulted and switched the patient to Zyvox. The patients symptoms slowly improved including her erythema and pain.     Patient was discharged in stable condition on 11/6/18. She is to complete 9  additional days of Zyvox. She is to follow up with Dr. Louie at his outpatient clinic on 11/7/18 at 9:00 AM for a punch biopsy. She is encouraged to follow up with Dr. Juan Vizcarra, her PCP, in 1-2 weeks as well.     We have called [facility], [phone] and scheduled an appointment with Dr. Louie at 9:00 AM on 11/7/18 for a punch biopsy to rule out malignancy.      Patient /Hospital Summary (Details -- Problem Oriented) :          Acute mastitis of left breast   Assessment & Plan    Started 2 weeks ago. Failed outpatient antibiotic treatment: keflex, clindamycin.   Ultrasound shows no abscess.  CT scan shows no abscess but likely mastoiditis and skin thickening most likely due to cellulitis  Blood cultures negative  Repeat US on 11/6/18 negative for abscess  Complete course of Zyvox  Meet with Dr. Louie for a left breast punch biopsy on 11/7/18 at 9:00 AM  Follow with PCP in 1-2 weeks     Generalized edema   Assessment & Plan    Resolved     Thrombocytosis (HCC)   Assessment & Plan    Likely reactive  Stable     Normocytic anemia   Assessment & Plan    low iron storage.   TSH, LDH, Iron studies WNL. Ferritin 104. B12. Folate.  Iron supplementation     Chest pain   Assessment & Plan    Resolved     Lung Nodule  4.9mm lung nodule, low risk  Consider f/u CT scan in 1 year      Consultants:     ID  General Surgery      Procedures:        None    Imaging/ Testing:      US-CHEST   Final Result      No drainable abscess.      US-EXTREMITY VENOUS UPPER UNILAT LEFT   Final Result      DX-CHEST-PORTABLE (1 VIEW)   Final Result         1.  No acute cardiopulmonary disease.      EC-ECHOCARDIOGRAM COMPLETE W/O CONT   Final Result      CT-CHEST (THORAX) WITH   Final Result         1.  Enlargement of left breast tissue without focal enhancing abscess, likely mastoiditis given history. Follow-up mammography to exclude underlying breast mass as clinically appropriate.   2.  Overlying skin thickening and  underlying subcutaneous fat stranding is seen, appearance likely cellulitis given history.  Note that inflammatory carcinoma could have radiographically similar appearance and should be excluded as clinically    appropriate.   3.  Left axillary adenopathy.   4.  Scattered subcentimeter low-density hepatic lesions, could represent small cysts or hemangiomas, too small to definitively characterize.   5.  4.9 millimeter pulmonary nodule, see nodule follow-up recommendations below.      Low Risk: No routine follow-up      High Risk: Optional CT at 12 months      Comments: Nodules less than 6 mm do not require routine follow-up, but certain patients at high risk with suspicious nodule morphology, upper lobe location, or both may warrant 12-month follow-up.      Low Risk - Minimal or absent history of smoking and of other known risk factors.      High Risk - History of smoking or of other known risk factors.      Note: These recommendations do not apply to lung cancer screening, patients with immunosuppression, or patients with known primary cancer.      Fleischner Society 2017 Guidelines for Management of Incidentally Detected Pulmonary Nodules in Adults      US-CHEST   Final Result      1.  No abscess is identified.      2.  Edema in the subcutaneous tissues of the left breast.            Discharge Medications:         Medication Reconciliation: Completed       Medication List      START taking these medications      Instructions   ferrous sulfate 325 (65 Fe) MG tablet  Start taking on:  11/6/2018   Take 1 Tab by mouth every morning with breakfast.  Dose:  325 mg     linezolid 600 MG Tabs  Commonly known as:  ZYVOX   Take 1 Tab by mouth every 12 hours.  Dose:  600 mg        CONTINUE taking these medications      Instructions   acetaminophen 500 MG Tabs  Commonly known as:  TYLENOL   Take 1,000 mg by mouth 2 times a day as needed.  Dose:  1000 mg     DESITIN EX   Apply 1 Each to affected area(s) 1 time daily as  needed.  Dose:  1 Each     ibuprofen 200 MG Tabs  Commonly known as:  MOTRIN   Take 600 mg by mouth 2 times a day as needed.  Dose:  600 mg     multivitamin Tabs   Take 1 Tab by mouth 1 time daily as needed.  Dose:  1 Tab        STOP taking these medications    cephALEXin 500 MG Caps  Commonly known as:  KEFLEX            Disposition:   Stable, Home    Diet:  Regular    Activity:   As tolerated    Instructions:      Please follow up with PCP in 1-2 weeks  Please Call [facility], [phone] to schedule and appointment with Dr. Louie in 2 weeks from discharge date.       The patient was instructed to return to the ER in the event of worsening symptoms. I have counseled the patient on the importance of compliance and the patient has agreed to proceed with all medical recommendations and follow up plan indicated above.   The patient understands that all medications come with benefits and risks. Risks may include permanent injury or death and these risks can be minimized with close reassessment and monitoring.        Primary Care Provider:    Dr. [facility]      Follow up appointment details :      Please follow up with PCP in 1-2 weeks  Please Call [facility], [phone] to schedule and appointment with Dr. Louie in 2 weeks from discharge date.       Pending Studies:        None      Discharge Time (Minutes) :    Greater than 35 minutes spent on discharge day patient visit, preparing discharge paperwork and arranging for patient follow up.    Hospital Course Type:  Inpatient Stay >2 midnights      Condition on Discharge    ______________________________________________________________________    Interval history/exam for day of discharge:     Stable, pain and erythema improving    Vitals:    11/04/18 1635 11/04/18 1854 11/05/18 0326 11/05/18 0835   BP: 125/78 133/86 101/62 117/82   Pulse: 68 61 79 68   Resp: 16 16 16 16   Temp: 36.8 °C (98.2 °F) 36.5 °C (97.7 °F) 37.4 °C (99.4 °F)  36.9 °C (98.4 °F)   TempSrc:       SpO2: 97% 99% 96% 98%   Weight:       Height:         Weight/BMI: Body mass index is 25.35 kg/m².  Pulse Oximetry: 98 %, O2 (LPM): 0, O2 Delivery: None (Room Air)      Physical Exam   Constitutional: She is well-developed, well-nourished, and in no distress. No distress.   HENT:   Head: Normocephalic and atraumatic.   Eyes: Conjunctivae are normal. Right eye exhibits no discharge. Left eye exhibits no discharge.   Neck: Normal range of motion. Neck supple. No tracheal deviation present. No thyromegaly present.   Cardiovascular: Normal rate, regular rhythm, normal heart sounds and intact distal pulses.  Exam reveals no gallop and no friction rub.    No murmur heard.  Pulmonary/Chest: Effort normal and breath sounds normal. No respiratory distress. She has no wheezes. She has no rales. She exhibits no tenderness.   Abdominal: Soft. Bowel sounds are normal. She exhibits no distension and no mass. There is no tenderness. There is no rebound and no guarding.   Musculoskeletal: Normal range of motion.   Neurological: She is alert. She exhibits normal muscle tone. Coordination normal.   Skin: Skin is warm. She is not diaphoretic. There is erythema.   Erythema on her left breast is improved      Most Recent Labs:    Lab Results   Component Value Date/Time    WBC 5.4 11/05/2018 12:20 PM    RBC 3.54 (L) 11/05/2018 12:20 PM    HEMOGLOBIN 10.9 (L) 11/05/2018 12:20 PM    HEMATOCRIT 32.8 (L) 11/05/2018 12:20 PM    MCV 92.7 11/05/2018 12:20 PM    MCH 30.8 11/05/2018 12:20 PM    MCHC 33.2 (L) 11/05/2018 12:20 PM    MPV 9.1 11/05/2018 12:20 PM    NEUTSPOLYS 38.50 (L) 11/05/2018 12:20 PM    LYMPHOCYTES 45.40 (H) 11/05/2018 12:20 PM    MONOCYTES 8.60 11/05/2018 12:20 PM    EOSINOPHILS 4.70 11/05/2018 12:20 PM    BASOPHILS 2.40 (H) 11/05/2018 12:20 PM      Lab Results   Component Value Date/Time    SODIUM 136 11/05/2018 12:20 PM    POTASSIUM 3.9 11/05/2018 12:20 PM    CHLORIDE 106 11/05/2018 12:20 PM     CO2 24 11/05/2018 12:20 PM    GLUCOSE 90 11/05/2018 12:20 PM    BUN 7 (L) 11/05/2018 12:20 PM    CREATININE 0.71 11/05/2018 12:20 PM      Lab Results   Component Value Date/Time    ALTSGPT 8 11/04/2018 03:19 AM    ASTSGOT 11 (L) 11/04/2018 03:19 AM    ALKPHOSPHAT 40 11/04/2018 03:19 AM    TBILIRUBIN 0.4 11/04/2018 03:19 AM    ALBUMIN 2.7 (L) 11/04/2018 03:19 AM    GLOBULIN 2.9 11/04/2018 03:19 AM     No results found for: PROTHROMBTM, INR

## 2018-11-05 NOTE — ASSESSMENT & PLAN NOTE
The patient complained of acute left UE swelling on 11/4/18, stat US showed acute superficial thrombus in the proximal forearm near the patient's IV site, but was negative for DVT  11/5/18 interview and exam significant for improvement in left UE swelling and discomfort    Advised the patient to continue with left UE elevation and heat therapy for symptomatic relief

## 2018-11-05 NOTE — PROGRESS NOTES
"Pt A&O x4. Calm.    Vitals: /86   Pulse 61   Temp 36.5 °C (97.7 °F)   Resp 16   Ht 1.6 m (5' 3\")   Wt 64.9 kg (143 lb 1.3 oz)   LMP 10/29/2018   SpO2 99%   Breastfeeding? Yes   BMI 25.35 kg/m²     Pt rates pain 2 out of 10 for a headache. Medicated for pain with Tylenol.     Neuro: MUNOZ. Denies new onset of numbness/ tingling.    Cardiac: Denies new onset of chest pain.    Vascular: Pulses 2+ BUE, BLE. Left breast edema 2+.    Respiratory: Lungs sound clear to auscultation. On RA. Denies SOB.    GI: Abdomen soft. + BM today. On regular diet, tolerating well. - nausea/ vomiting.    : Pt voiding adequately.      MSK: Pt up to bathroom self, tolerating well. Ambulating halls. Pt reports doing stretches in room.     Integumentary: Left breast is swollen, red, semi-firm, blanching. Pt was able to collect a small amount of purulent drainage on a swab stick, placed order for wound culture with gram stain, sent sample down to lab. Unable to obtain breast milk sample per order at this time, pt states she is unable to produce any.     Labs noted. Pt does not have IV access, refusing to place another. MD aware.     Fall precautions in place: Bed locked in lowest position, Upper bed rails up, treaded socks in place, personal belongings within reach, call light within reach, appropriate mobility signs in place, - bed alarm. Pt calls appropriately.     Pt updated on POC.   "

## 2018-11-05 NOTE — DISCHARGE INSTRUCTIONS
Discharge Instructions    Discharged to home by car with relative. Discharged via wheelchair, hospital escort: Yes.  Special equipment needed: Not Applicable    Be sure to schedule a follow-up appointment with your primary care doctor or any specialists as instructed.     Discharge Plan:   Diet Plan: Discussed  Activity Level: Discussed  Confirmed Follow up Appointment: Appointment Scheduled  Confirmed Symptoms Management: Discussed  Medication Reconciliation Updated: Yes  Influenza Vaccine Indication: Indicated: 9 to 64 years of age  Influenza Vaccine Given - only chart on this line when given: Influenza Vaccine Given (See MAR)    I understand that a diet low in cholesterol, fat, and sodium is recommended for good health. Unless I have been given specific instructions below for another diet, I accept this instruction as my diet prescription.   Other diet: follow your regular diet as tolerated    Special Instructions: None    · Is patient discharged on Warfarin / Coumadin?   No     Depression / Suicide Risk    As you are discharged from this [facility] facility, it is important to learn how to keep safe from harming yourself.    Recognize the warning signs:  · Abrupt changes in personality, positive or negative- including increase in energy   · Giving away possessions  · Change in eating patterns- significant weight changes-  positive or negative  · Change in sleeping patterns- unable to sleep or sleeping all the time   · Unwillingness or inability to communicate  · Depression  · Unusual sadness, discouragement and loneliness  · Talk of wanting to die  · Neglect of personal appearance   · Rebelliousness- reckless behavior  · Withdrawal from people/activities they love  · Confusion- inability to concentrate     If you or a loved one observes any of these behaviors or has concerns about self-harm, here's what you can do:  · Talk about it- your feelings and reasons for harming yourself  · Remove any means that you  might use to hurt yourself (examples: pills, rope, extension cords, firearm)  · Get professional help from the community (Mental Health, Substance Abuse, psychological counseling)  · Do not be alone:Call your Safe Contact- someone whom you trust who will be there for you.  · Call your local CRISIS HOTLINE 345-6913 or 051-520-5327  · Call your local Children's Mobile Crisis Response Team [place] (720) 990-4214 or wwwCinemacraft  · Call the toll free National Suicide Prevention Hotlines   · National Suicide Prevention Lifeline 776-559-WQBV (3831)  · [employer] Line Network 800-SUICIDE (468-1668)      Mastitis  Mastitis is redness, soreness, and puffiness (inflammation) in an area of the breast. It is often caused by an infection that occurs when bacteria enter the skin. The infection is often helped by antibiotic medicine.  Follow these instructions at home:  · Only take medicines as told by your doctor.  · If your doctor prescribed an antibiotic medicine, take it as told. Finish it even if you start to feel better.  · Do not wear a tight or underwire bra. Wear a soft support bra.  · Drink more fluids, especially if you have a fever.  · If you are breastfeeding:  ¨ Keep emptying the breast. Your doctor can tell you if the milk is safe. Use a breast pump if you are told to stop nursing.  ¨ Keep your nipples clean and dry.  ¨ Empty the first breast before going to the other breast. Use a breast pump if your baby is not emptying your breast.  ¨ If you go back to work, pump your breasts while at work.  ¨ Avoid letting your breasts get overly filled with milk (engorged).  Contact a doctor if:  · You have pus-like fluid leaking from your breast.  · Your symptoms do not get better within 2 days.  Get help right away if:  · Your pain and puffiness are getting worse.  · Your pain is not helped by medicine.  · You have a red line going from your breast toward your armpit.  · You have a fever or lasting symptoms  for more than 2-3 days.  · You have a fever and your symptoms suddenly get worse.  This information is not intended to replace advice given to you by your health care provider. Make sure you discuss any questions you have with your health care provider.  Document Released: 12/06/2010 Document Revised: 05/25/2017 Document Reviewed: 07/18/2014  [employer] Interactive Patient Education © 2017 [employer] Inc.  Linezolid tablets  What is this medicine?  LINEZOLID (li NE zoarpan lid) is an oxazolidinone antibiotic. It is used to treat certain kinds of bacterial infections. It will not work for colds, flu, or other viral infections.  This medicine may be used for other purposes; ask your health care provider or pharmacist if you have questions.  COMMON BRAND NAME(S): Zyvox  What should I tell my health care provider before I take this medicine?  They need to know if you have any of these conditions:  -  cancer  -diabetes  -  heart disease  -  high blood pressure  -  kidney disease  -  pheochromocytoma  -  untreated thyroid disease  -  an unusual or allergic reaction to linezolid, other antibiotics or medicines, foods, dyes, or preservatives  -  pregnant or trying to get pregnant  -  breast-feeding  How should I use this medicine?  Take this medicine by mouth with a glass of water. Follow the directions on the prescription label. Take with food or on an empty stomach. Take your medicine at regular intervals. Do not take your medicine more often than directed. Take all of your medicine as directed even if you think your are better. Do not skip doses or stop your medicine early.  Talk to your pediatrician regarding the use of this medicine in children. While this drug may be prescribed for selected conditions, precautions do apply.  Overdosage: If you think you have taken too much of this medicine contact a poison control center or emergency room at once.  NOTE: This medicine is only for you. Do not share this medicine with  others.  What if I miss a dose?  If you miss a dose, take it as soon as you can. If it is almost time for your next dose, take only that dose. Do not take double or extra doses.  What may interact with this medicine?  Do not take this medicine with any of the following medications:  -bupropion  -certain medicines for depression, anxiety, or psychotic disturbances  -doxepin  -fluoxetine  -furazolidone  -green tea  -MAOIs like Carbex, Eldepryl, Marplan, Nardil, and Parnate  -milnacipran  -procarbazine  -rasagiline  -selegiline  -Lonny's wort  -tryptophan  This medicine may also interact with the following medications:  -birth control pills  -medicines for allergies or colds like phenylpropanolamine, pseudoephedrine  -medicines for blood pressure  -stimulant medicines for attention disorders, weight loss, or to stay awake  This list may not describe all possible interactions. Give your health care provider a list of all the medicines, herbs, non-prescription drugs, or dietary supplements you use. Also tell them if you smoke, drink alcohol, or use illegal drugs. Some items may interact with your medicine.  What should I watch for while using this medicine?  Tell your doctor or health care professional if your symptoms do not begin to improve or if you get new symptoms.  You will need to be on a special diet while taking this medicine. Ask your doctor or health care professional for a list of foods that you should try to avoid. This includes, but is not limited to, smoked or processed meats, aged cheeses, soy sauce, red carlos and beer.  Do not treat diarrhea with over-the-counter products. Contact your doctor if you have diarrhea that lasts more than 2 days or if the diarrhea is severe and watery.  What side effects may I notice from receiving this medicine?  Side effects that you should report to your doctor or health care professional as soon as possible:  -allergic reactions like skin rash, itching or hives,  swelling of the face, lips, or tongue  -breathing problems  -burning, numbness, or tingling  -changes in vision  -confused, restless  -discolored, sore mouth  -fever  -irregular heart beat, blood pressure  -seizures  -tremor, trouble walking  -unusual bleeding or bruising  -unusually weak or tired  Side effects that usually do not require medical attention (report to your doctor or health care professional if they continue or are bothersome):  -changes in taste  -constipation or diarrhea  -dizzy  -headache  -nausea, vomiting  -stomach upset  -trouble sleeping  -vaginal itch, irritation  This list may not describe all possible side effects. Call your doctor for medical advice about side effects. You may report side effects to FDA at 7-435-FDA-8137.  Where should I keep my medicine?  Keep out of the reach of children.  Store at room temperature between 15 and 30 degrees C (59 and 86 degrees F). Keep container tightly closed to protect from light and moisture. Throw away any unused medicine after the expiration date.  NOTE: This sheet is a summary. It may not cover all possible information. If you have questions about this medicine, talk to your doctor, pharmacist, or health care provider.  © 2018 Elsevier/Gold Standard (2014-07-25 14:02:20)    Continue Zyvox for 10 more days

## 2018-11-05 NOTE — PROGRESS NOTES
Infectious Disease Progress Note    Author: Becky Heaton M.D. Date & Time of service: 2018  11:51 AM    Chief Complaint:  Mastitis of the left breast      Interval History:  44 yo female admitted with above   AF WBC 6.4 feels better Hopeful to go home-denies SE abx  Labs Reviewed, Medications Reviewed, Radiology Reviewed and Wound Reviewed.    Review of Systems:  Review of Systems   Constitutional: Negative for chills and fever.   Gastrointestinal: Negative for abdominal pain and nausea.   Skin:        Decreased erythema and edema left breast   All other systems reviewed and are negative.      Hemodynamics:  Temp (24hrs), Av.9 °C (98.4 °F), Min:36.5 °C (97.7 °F), Max:37.4 °C (99.4 °F)  Temperature: 36.9 °C (98.4 °F)  Pulse  Av.7  Min: 61  Max: 103   Blood Pressure: 117/82       Physical Exam:  Physical Exam   Constitutional: She is oriented to person, place, and time. She appears well-developed.   HENT:   Head: Normocephalic and atraumatic.   Eyes: Pupils are equal, round, and reactive to light. EOM are normal.   Neck: Neck supple.   Cardiovascular: Normal rate.    Pulmonary/Chest: Effort normal. No respiratory distress.   Abdominal: Soft. She exhibits no distension. There is no tenderness.   Musculoskeletal: She exhibits no edema.   Neurological: She is alert and oriented to person, place, and time.   Skin: Skin is warm. She is not diaphoretic. There is erythema.   Decreased erythema and induration left breast   Nursing note and vitals reviewed.      Meds:    Current Facility-Administered Medications:   •  ferrous sulfate  •  linezolid  •  enoxaparin (LOVENOX) injection  •  senna-docusate **AND** polyethylene glycol/lytes **AND** magnesium hydroxide **AND** bisacodyl  •  Respiratory Care per Protocol  •  acetaminophen  •  oxyCODONE immediate-release  •  ibuprofen  •  diphenhydrAMINE  •  NS    Labs:  Recent Labs      18   0232  18   0319   WBC  8.6  6.4   RBC  3.58*  3.25*    HEMOGLOBIN  10.9*  9.8*   HEMATOCRIT  33.0*  30.7*   MCV  92.2  94.5   MCH  30.4  30.2   RDW  43.4  44.0   PLATELETCT  510*  489*   MPV  9.0  9.0   NEUTSPOLYS  55.20  42.30*   LYMPHOCYTES  31.70  43.00*   MONOCYTES  8.10  8.10   EOSINOPHILS  3.50  4.10   BASOPHILS  1.20  2.20*     Recent Labs      11/03/18   0232  11/04/18   0319   SODIUM  140  139   POTASSIUM  4.0  3.7   CHLORIDE  111  112   CO2  23  21   GLUCOSE  99  99   BUN  12  8     Recent Labs      11/03/18   0232  11/04/18   0319   ALBUMIN  2.9*  2.7*   TBILIRUBIN  0.2  0.4   ALKPHOSPHAT  49  40   TOTPROTEIN  6.0  5.6*   ALTSGPT  9  8   ASTSGOT  12  11*   CREATININE  0.71  0.54       Imaging:  Ct-chest (thorax) With    Result Date: 11/2/2018 11/2/2018 9:28 PM HISTORY/REASON FOR EXAM:  Left breast abscess. TECHNIQUE/EXAM DESCRIPTION:  CT scan of the chest with contrast. Thin-section helical images were obtained from the lung apices through the adrenal glands following the bolus administration of contrast. 80 mL of Omnipaque 350 nonionic contrast was utilized. Low dose optimization technique was utilized for this CT exam including automated exposure control and adjustment of the mA and/or kV according to patient size. COMPARISON:  None. FINDINGS: The pulmonary parenchyma is clear. 4.9 mm lingular pulmonary nodule is seen on image 79. There is no mediastinal, hilar, or axillary adenopathy. The cardiac chambers, great vessels, and aorta are normal in CT appearance. There are no pleural effusions or other abnormality of the pleura. The extrathoracic soft tissues and thoracic cage are normal in appearance. The adrenal glands are normal.  The visualized portions of the spleen, pancreas, and kidneys are normal. Scattered subcentimeter low-density hepatic lesions are seen. There is no upper abdominal adenopathy. There is heterogeneous enlargement of the left breast soft tissue identified, no focal enhancing soft tissue abscess is identified. There is skin  thickening of the anterior left breast with subcutaneous fat stranding. Enlarged left axillary lymph nodes are seen.     1.  Enlargement of left breast tissue without focal enhancing abscess, likely mastoiditis given history. Follow-up mammography to exclude underlying breast mass as clinically appropriate. 2.  Overlying skin thickening and underlying subcutaneous fat stranding is seen, appearance likely cellulitis given history.  Note that inflammatory carcinoma could have radiographically similar appearance and should be excluded as clinically appropriate. 3.  Left axillary adenopathy. 4.  Scattered subcentimeter low-density hepatic lesions, could represent small cysts or hemangiomas, too small to definitively characterize. 5.  4.9 millimeter pulmonary nodule, see nodule follow-up recommendations below. Low Risk: No routine follow-up High Risk: Optional CT at 12 months Comments: Nodules less than 6 mm do not require routine follow-up, but certain patients at high risk with suspicious nodule morphology, upper lobe location, or both may warrant 12-month follow-up. Low Risk - Minimal or absent history of smoking and of other known risk factors. High Risk - History of smoking or of other known risk factors. Note: These recommendations do not apply to lung cancer screening, patients with immunosuppression, or patients with known primary cancer. Fleischner Society 2017 Guidelines for Management of Incidentally Detected Pulmonary Nodules in Adults    Dx-chest-portable (1 View)    Result Date: 11/4/2018 11/4/2018 5:59 AM HISTORY/REASON FOR EXAM:  Possible fluid overload TECHNIQUE/EXAM DESCRIPTION:  Single AP view of the chest. COMPARISON: FINDINGS: The cardiac silhouette appears within normal limits. The mediastinal contour appears within normal limits.  The central pulmonary vasculature appears normal. The lungs appear well expanded bilaterally.  Bilateral lungs are clear. No significant pleural effusions are  identified. The bony structures appear age-appropriate.     1.  No acute cardiopulmonary disease.    Us-chest    Result Date: 2018 12:44 PM HISTORY/REASON FOR EXAM:  Mass/lump Red and swollen left breast. Evaluate for abscess TECHNIQUE/EXAM DESCRIPTION AND NUMBER OF VIEWS: Limited sonographic images of the left breast COMPARISON: None FINDINGS: Sonographic images of the left breast targeted to the area of redness demonstrate edema in the subcutaneous tissues. No fluid collection is identified.     1.  No abscess is identified. 2.  Edema in the subcutaneous tissues of the left breast.    Us-extremity Venous Upper Unilat Left    Result Date: 2018   Upper Extremity  Venous Duplex Report  Vascular Laboratory  CONCLUSIONS  Left upper extremity.  Acute superficial thrombus fills the cephalic vein from the mid bicep  through the antecubital fossa, inclusive of the median cubital vein, as  well as the basilic vein at the proximal forearm at the site of the IV.  Thrombus appears near-occlusive in the cephalic vein at the mid bicep, but  totally occlusive distal to this point. Thrombus appears near-occlusive in  the basilic vein at the proximal forearm.  No evidence of deep venous thrombosis.  Soft tissue edema.  [place], VIRGIL  Exam Date:     2018 10:06  Room #:     Inpatient  Priority:     Routine  Ht (in):             Wt (lb):  Ordering Physician:        ANDREW ESPARZA  Referring Physician:       [id number] Galaviz  Sonographer:               Matt Mcarthur RVT  Study Type:                Complete Unilateral  Technical Quality:         Adequate  Age:    43    Gender:     F  MRN:    [id number]  :    1975      BSA:  Indications:     Swelling of Limb  CPT Codes:       03615  ICD Codes:       729.81  History:         Left upper extremity swelling  Limitations:  PROCEDURES:  Left upper extremity venous duplex imaging.  The following venous structures were evaluated: internal jugular,  subclavian,  axillary, brachial, radial, ulnar, cephalic and basilic veins.  Serial compression, augmentation maneuvers,  color and spectral Doppler  flow evaluations were performed.  FINDINGS:  Left upper extremity.  Echolucent material consistent with acute superficial thrombus fills the  cephalic vein from the mid bicep through the antecubital fossa, inclusive  of the median cubital vein, as well as the basilic vein at the proximal  forearm at the site of the IV. Thrombus appears near-occlusive in the  cephalic vein at the mid bicep, but totally occlusive distal to this point.  Thrombus appears near-occlusive in the basilic vein at the proximal  forearm.  No evidence of deep venous thrombosis.  Flow was evaluated in the contralateral subclavian vein and normal venous  flow dynamics including spontaneous flow, respiratory phasic variation and  augmentation were demonstrated.  Mirna Noble  (Electronically Signed)  Final Date:      2018                   11:53    Ec-echocardiogram Complete W/o Cont    Result Date: 11/3/2018  Transthoracic Echo Report Echocardiography Laboratory CONCLUSIONS Normal left ventricular systolic and diastolic function. Normal right ventricular function. No significant valvular abnormalities. No pericardial effusion is seen. [facility] Exam Date:         2018                    09:45 Exam Location:     Inpatient Priority:          Routine Ordering Physician:        ANDREW ESPARZA Referring Physician: Sonographer:               Hernandez Erazo RDCS Age:    43     Gender:    F MRN:    [id number] :    1975 BSA:    1.64   Ht (in):    63     Wt (lb):    135 Exam Type:     Complete Indications:     Infective pericarditis ICD Codes:       I301 CPT Codes:       41821 BP:   112    /   74     HR: Technical Quality:       Good MEASUREMENTS  (Male / Female) Normal Values M-MODE LV Diastolic Diameter MM          4.3 cm                4.2 - 5.9 / 3.9 - 5.3 cm LV Systolic Diameter  MM           2.4 cm                LV Fractional Shortening MM       44.9 %                25 - 43 / 27 - 45 % IVS Diastolic Thickness MM        0.75 cm               0.6 - 1.0 / 0.6 - 0.9 cm LVPW Diastolic Thickness MM       0.78 cm               0.6 - 1.0 / 0.6 - 0.9 cm Aortic Root Diameter MM           2.5 cm                DOPPLER AV Peak Velocity                  1.3 m/s               AV Peak Gradient                  6.7 mmHg              AV Mean Gradient                  3.5 mmHg              LVOT Peak Velocity                1.2 m/s               Mitral E Point Velocity           0.86 m/s              Mitral E to A Ratio               1.8                   Mitral A Duration                 143 ms                MV Pressure Half Time             59.2 ms               MV Area PHT                       3.7 cm²               MV Deceleration Time              204 ms                TR Peak Velocity                  242 cm/s              TR Peak Gradient                  23.5 mmHg             PV Peak Velocity                  0.97 m/s              PV Peak Gradient                  3.7 mmHg              * Indicates values subject to auto-interpretation LV EF:        % FINDINGS Left Ventricle Normal left ventricular size, thickness, systolic function, and diastolic function. Left ventricular ejection fraction is visually estimated to be 65%. Right Ventricle Normal right ventricular size and systolic function. Right Atrium Normal right atrial size. Left Atrium Normal left atrial size. Mitral Valve Structurally normal mitral valve. Trace mitral regurgitation. Aortic Valve Structurally normal aortic valve without significant stenosis or regurgitation. Tricuspid Valve Structurally normal tricuspid valve. Mild tricuspid regurgitation. Right ventricular systolic pressure is estimated to be 26 mmHg. Right atrial pressure is estimated to be 3 mmHg. Pulmonic Valve Structurally normal pulmonic valve without significant  "stenosis . Trace regurgitation. Pericardium No pericardial effusion. Aorta Normal aortic root for body surface area. Leonides Jimenez (Electronically Signed) Final Date:     03 November 2018                 13:03      Micro:  Results     Procedure Component Value Units Date/Time    CULTURE WOUND W/ GRAM STAIN [[id number]] Collected:  11/04/18 2350    Order Status:  Completed Specimen:  Wound from Chest Updated:  11/05/18 0010    Narrative:       Collected By:[id number] JOHN ACEVEDO  Left breast    FLUID CULTURE [[id number]]     Order Status:  No result Specimen:  Body Fluid from Other Body Fluid     BLOOD CULTURE [[id number]] Collected:  11/03/18 0232    Order Status:  Completed Specimen:  Blood from Peripheral Updated:  11/04/18 0617     Significant Indicator NEG     Source BLD     Site PERIPHERAL     Blood Culture No Growth    Note: Blood cultures are incubated for 5 days and  are monitored continuously.Positive blood cultures  are called to the RN and reported as soon as  they are identified.      Narrative:       Per Hospital Policy: Only change Specimen Src: to \"Line\" if  specified by physician order.    BLOOD CULTURE [[id number]] Collected:  11/03/18 0233    Order Status:  Completed Specimen:  Blood from Peripheral Updated:  11/04/18 0617     Significant Indicator NEG     Source BLD     Site PERIPHERAL     Blood Culture No Growth    Note: Blood cultures are incubated for 5 days and  are monitored continuously.Positive blood cultures  are called to the RN and reported as soon as  they are identified.      Narrative:       Per Hospital Policy: Only change Specimen Src: to \"Line\" if  specified by physician order.    Blood Culture [[id number]]     Order Status:  Canceled Specimen:  Blood from Peripheral           Assessment:  Active Hospital Problems    Diagnosis   • Acute mastitis of left breast [N61.0]   • Generalized edema [R60.1]   • Chest pain [R07.9]   • Normocytic anemia [D64.9]   • Left arm swelling [M79.89] "   • Thrombocytosis (HCC) [D47.3]       Plan:  Mastitis of the left breast  Afebrile  No leukocytosis  Clinically improving slowly  Monitor closely for need for abscess drainage  Bcxs neg  No further breastfeeding  Continue Zyvox for 10 more days    FU ID clinic as needed    Discussed with internal medicine/[facility] resident.

## 2018-11-06 ENCOUNTER — PATIENT OUTREACH (OUTPATIENT)
Dept: HEALTH INFORMATION MANAGEMENT | Facility: OTHER | Age: 43
End: 2018-11-06

## 2018-11-06 ENCOUNTER — APPOINTMENT (OUTPATIENT)
Dept: RADIOLOGY | Facility: REHABILITATION | Age: 43
DRG: 600 | End: 2018-11-06
Attending: STUDENT IN AN ORGANIZED HEALTH CARE EDUCATION/TRAINING PROGRAM
Payer: MEDICAID

## 2018-11-06 VITALS
DIASTOLIC BLOOD PRESSURE: 77 MMHG | HEART RATE: 85 BPM | WEIGHT: 143.08 LBS | RESPIRATION RATE: 16 BRPM | OXYGEN SATURATION: 96 % | SYSTOLIC BLOOD PRESSURE: 106 MMHG | BODY MASS INDEX: 25.35 KG/M2 | HEIGHT: 63 IN | TEMPERATURE: 98.3 F

## 2018-11-06 LAB
GRAM STN SPEC: NORMAL
SIGNIFICANT IND 70042: NORMAL
SITE SITE: NORMAL
SOURCE SOURCE: NORMAL

## 2018-11-06 PROCEDURE — 700102 HCHG RX REV CODE 250 W/ 637 OVERRIDE(OP): Performed by: INTERNAL MEDICINE

## 2018-11-06 PROCEDURE — 700102 HCHG RX REV CODE 250 W/ 637 OVERRIDE(OP): Performed by: STUDENT IN AN ORGANIZED HEALTH CARE EDUCATION/TRAINING PROGRAM

## 2018-11-06 PROCEDURE — 76604 US EXAM CHEST: CPT

## 2018-11-06 PROCEDURE — 87205 SMEAR GRAM STAIN: CPT

## 2018-11-06 PROCEDURE — 99232 SBSQ HOSP IP/OBS MODERATE 35: CPT | Performed by: INTERNAL MEDICINE

## 2018-11-06 PROCEDURE — A9270 NON-COVERED ITEM OR SERVICE: HCPCS | Performed by: INTERNAL MEDICINE

## 2018-11-06 PROCEDURE — A9270 NON-COVERED ITEM OR SERVICE: HCPCS | Performed by: STUDENT IN AN ORGANIZED HEALTH CARE EDUCATION/TRAINING PROGRAM

## 2018-11-06 PROCEDURE — 87070 CULTURE OTHR SPECIMN AEROBIC: CPT

## 2018-11-06 RX ORDER — LINEZOLID 600 MG/1
600 TABLET, FILM COATED ORAL EVERY 12 HOURS
Qty: 18 TAB | Refills: 0 | Status: SHIPPED | OUTPATIENT
Start: 2018-11-06 | End: 2018-11-15

## 2018-11-06 RX ADMIN — FERROUS SULFATE TAB 325 MG (65 MG ELEMENTAL FE) 325 MG: 325 (65 FE) TAB at 08:39

## 2018-11-06 RX ADMIN — LINEZOLID 600 MG: 600 TABLET, FILM COATED ORAL at 17:35

## 2018-11-06 RX ADMIN — LINEZOLID 600 MG: 600 TABLET, FILM COATED ORAL at 05:16

## 2018-11-06 ASSESSMENT — ENCOUNTER SYMPTOMS
NAUSEA: 0
CHILLS: 0
ABDOMINAL PAIN: 0
FEVER: 0

## 2018-11-06 ASSESSMENT — PAIN SCALES - GENERAL: PAINLEVEL_OUTOF10: 0

## 2018-11-06 NOTE — PROGRESS NOTES
Report received from night RN, assumed Care.   Patient is AOx4, responds appropriately.      Patient states L breast feels similar to yesterday, no increasing pain.  L breast is edematous, red, no discharge at present.  Redness outline appears to be decreased slightly.  Sister is at bedside.   Updated on POC.   Patient is tolerating regular diet, denies nausea/vomiting. + flatus  Up self with steady gait.    Plan of care discussed, all questions answered.       Call light and belongings within reach, treaded slipper socks on, bed in lowest locked position.  Hourly rounding in place, all needs met at this time

## 2018-11-06 NOTE — PROGRESS NOTES
Patient attempting to collect another gram stain/culture from nipple/breast discharge.  Unable to at this time due to lack of discharge.

## 2018-11-06 NOTE — PROGRESS NOTES
Infectious Disease Progress Note    Author: Becky Heaton M.D. Date & Time of service: 2018  11:58 AM    Chief Complaint:  Mastitis of the left breast      Interval History:  42 yo female admitted with above  / AF WBC 6.4 feels better Hopeful to go home-denies SE abx   AF had USG this am-denies SE abx  Labs Reviewed, Medications Reviewed, Radiology Reviewed and Wound Reviewed.    Review of Systems:  Review of Systems   Constitutional: Negative for chills and fever.   Cardiovascular: Negative for chest pain.   Gastrointestinal: Negative for abdominal pain and nausea.   Skin:        Decreased erythema and edema left breast   All other systems reviewed and are negative.      Hemodynamics:  Temp (24hrs), Av °C (98.6 °F), Min:36.5 °C (97.7 °F), Max:37.3 °C (99.2 °F)  Temperature: 36.9 °C (98.5 °F)  Pulse  Av.8  Min: 61  Max: 103   Blood Pressure: 105/75       Physical Exam:  Physical Exam   Constitutional: She is oriented to person, place, and time. She appears well-developed.   HENT:   Head: Normocephalic and atraumatic.   Eyes: Pupils are equal, round, and reactive to light. EOM are normal. No scleral icterus.   Neck: Neck supple.   Cardiovascular: Normal rate.    Pulmonary/Chest: Effort normal. No respiratory distress. She has no wheezes. She has no rales.   Abdominal: Soft. She exhibits no distension. There is no tenderness.   Musculoskeletal: She exhibits no edema.   Neurological: She is alert and oriented to person, place, and time.   Skin: Skin is warm. She is not diaphoretic. There is erythema.   Decreased erythema and induration left breast  No palpable abscess   Nursing note and vitals reviewed.      Meds:    Current Facility-Administered Medications:   •  ferrous sulfate  •  linezolid  •  enoxaparin (LOVENOX) injection  •  senna-docusate **AND** polyethylene glycol/lytes **AND** magnesium hydroxide **AND** bisacodyl  •  Respiratory Care per Protocol  •  acetaminophen  •  oxyCODONE  immediate-release  •  ibuprofen  •  diphenhydrAMINE  •  NS    Labs:  Recent Labs      11/04/18 0319 11/05/18   1220   WBC  6.4  5.4   RBC  3.25*  3.54*   HEMOGLOBIN  9.8*  10.9*   HEMATOCRIT  30.7*  32.8*   MCV  94.5  92.7   MCH  30.2  30.8   RDW  44.0  42.3   PLATELETCT  489*  511*   MPV  9.0  9.1   NEUTSPOLYS  42.30*  38.50*   LYMPHOCYTES  43.00*  45.40*   MONOCYTES  8.10  8.60   EOSINOPHILS  4.10  4.70   BASOPHILS  2.20*  2.40*     Recent Labs      11/04/18 0319 11/05/18   1220   SODIUM  139  136   POTASSIUM  3.7  3.9   CHLORIDE  112  106   CO2  21  24   GLUCOSE  99  90   BUN  8  7*     Recent Labs      11/04/18 0319 11/05/18   1220   ALBUMIN  2.7*   --    TBILIRUBIN  0.4   --    ALKPHOSPHAT  40   --    TOTPROTEIN  5.6*   --    ALTSGPT  8   --    ASTSGOT  11*   --    CREATININE  0.54  0.71       Imaging:  Ct-chest (thorax) With    Result Date: 11/2/2018 11/2/2018 9:28 PM HISTORY/REASON FOR EXAM:  Left breast abscess. TECHNIQUE/EXAM DESCRIPTION:  CT scan of the chest with contrast. Thin-section helical images were obtained from the lung apices through the adrenal glands following the bolus administration of contrast. 80 mL of Omnipaque 350 nonionic contrast was utilized. Low dose optimization technique was utilized for this CT exam including automated exposure control and adjustment of the mA and/or kV according to patient size. COMPARISON:  None. FINDINGS: The pulmonary parenchyma is clear. 4.9 mm lingular pulmonary nodule is seen on image 79. There is no mediastinal, hilar, or axillary adenopathy. The cardiac chambers, great vessels, and aorta are normal in CT appearance. There are no pleural effusions or other abnormality of the pleura. The extrathoracic soft tissues and thoracic cage are normal in appearance. The adrenal glands are normal.  The visualized portions of the spleen, pancreas, and kidneys are normal. Scattered subcentimeter low-density hepatic lesions are seen. There is no upper abdominal  adenopathy. There is heterogeneous enlargement of the left breast soft tissue identified, no focal enhancing soft tissue abscess is identified. There is skin thickening of the anterior left breast with subcutaneous fat stranding. Enlarged left axillary lymph nodes are seen.     1.  Enlargement of left breast tissue without focal enhancing abscess, likely mastoiditis given history. Follow-up mammography to exclude underlying breast mass as clinically appropriate. 2.  Overlying skin thickening and underlying subcutaneous fat stranding is seen, appearance likely cellulitis given history.  Note that inflammatory carcinoma could have radiographically similar appearance and should be excluded as clinically appropriate. 3.  Left axillary adenopathy. 4.  Scattered subcentimeter low-density hepatic lesions, could represent small cysts or hemangiomas, too small to definitively characterize. 5.  4.9 millimeter pulmonary nodule, see nodule follow-up recommendations below. Low Risk: No routine follow-up High Risk: Optional CT at 12 months Comments: Nodules less than 6 mm do not require routine follow-up, but certain patients at high risk with suspicious nodule morphology, upper lobe location, or both may warrant 12-month follow-up. Low Risk - Minimal or absent history of smoking and of other known risk factors. High Risk - History of smoking or of other known risk factors. Note: These recommendations do not apply to lung cancer screening, patients with immunosuppression, or patients with known primary cancer. Fleischner Society 2017 Guidelines for Management of Incidentally Detected Pulmonary Nodules in Adults    Dx-chest-portable (1 View)    Result Date: 11/4/2018 11/4/2018 5:59 AM HISTORY/REASON FOR EXAM:  Possible fluid overload TECHNIQUE/EXAM DESCRIPTION:  Single AP view of the chest. COMPARISON: FINDINGS: The cardiac silhouette appears within normal limits. The mediastinal contour appears within normal limits.  The  central pulmonary vasculature appears normal. The lungs appear well expanded bilaterally.  Bilateral lungs are clear. No significant pleural effusions are identified. The bony structures appear age-appropriate.     1.  No acute cardiopulmonary disease.    Us-chest    Result Date: 2018 12:44 PM HISTORY/REASON FOR EXAM:  Mass/lump Red and swollen left breast. Evaluate for abscess TECHNIQUE/EXAM DESCRIPTION AND NUMBER OF VIEWS: Limited sonographic images of the left breast COMPARISON: None FINDINGS: Sonographic images of the left breast targeted to the area of redness demonstrate edema in the subcutaneous tissues. No fluid collection is identified.     1.  No abscess is identified. 2.  Edema in the subcutaneous tissues of the left breast.    Us-extremity Venous Upper Unilat Left    Result Date: 2018   Upper Extremity  Venous Duplex Report  Vascular Laboratory  CONCLUSIONS  Left upper extremity.  Acute superficial thrombus fills the cephalic vein from the mid bicep  through the antecubital fossa, inclusive of the median cubital vein, as  well as the basilic vein at the proximal forearm at the site of the IV.  Thrombus appears near-occlusive in the cephalic vein at the mid bicep, but  totally occlusive distal to this point. Thrombus appears near-occlusive in  the basilic vein at the proximal forearm.  No evidence of deep venous thrombosis.  Soft tissue edema.  [place], VIRGIL  Exam Date:     2018 10:06  Room #:     Inpatient  Priority:     Routine  Ht (in):             Wt (lb):  Ordering Physician:        ANDREW ESPARZA  Referring Physician:       [id number]  Sonographer:               Matt Mcarthur RVT  Study Type:                Complete Unilateral  Technical Quality:         Adequate  Age:    43    Gender:     F  MRN:    [id number]  :    1975      BSA:  Indications:     Swelling of Limb  CPT Codes:       11612  ICD Codes:       729.81  History:         Left upper extremity  swelling  Limitations:  PROCEDURES:  Left upper extremity venous duplex imaging.  The following venous structures were evaluated: internal jugular,  subclavian, axillary, brachial, radial, ulnar, cephalic and basilic veins.  Serial compression, augmentation maneuvers,  color and spectral Doppler  flow evaluations were performed.  FINDINGS:  Left upper extremity.  Echolucent material consistent with acute superficial thrombus fills the  cephalic vein from the mid bicep through the antecubital fossa, inclusive  of the median cubital vein, as well as the basilic vein at the proximal  forearm at the site of the IV. Thrombus appears near-occlusive in the  cephalic vein at the mid bicep, but totally occlusive distal to this point.  Thrombus appears near-occlusive in the basilic vein at the proximal  forearm.  No evidence of deep venous thrombosis.  Flow was evaluated in the contralateral subclavian vein and normal venous  flow dynamics including spontaneous flow, respiratory phasic variation and  augmentation were demonstrated.  Mirna Noble  (Electronically Signed)  Final Date:      04 November 2018                   11:53    Ec-echocardiogram Complete W/o Cont    Result Date: 11/3/2018  Transthoracic Echo Report Echocardiography Laboratory CONCLUSIONS Normal left ventricular systolic and diastolic function. Normal right ventricular function. No significant valvular abnormalities. No pericardial effusion is seen. [place], VIRGIL Exam Date:          LV EF:        % FINDINGS Left Ventricle Normal left ventricular size, thickness, systolic function, and diastolic function. Left ventricular ejection fraction is visually estimated to be 65%. Right Ventricle Normal right ventricular size and systolic function. Right Atrium Normal right atrial size. Left Atrium Normal left atrial size. Mitral Valve Structurally normal mitral valve. Trace mitral regurgitation. Aortic Valve Structurally normal aortic valve without significant  stenosis or regurgitation. Tricuspid Valve Structurally normal tricuspid valve. Mild tricuspid regurgitation. Right ventricular systolic pressure is estimated to be 26 mmHg. Right atrial pressure is estimated to be 3 mmHg. Pulmonic Valve Structurally normal pulmonic valve without significant stenosis . Trace regurgitation. Pericardium No pericardial effusion. Aorta Normal aortic root for body surface area. Leonides Jimenez (Electronically Signed) Final Date:     03 November 2018                 13:03      Micro:  Results     Procedure Component Value Units Date/Time    CULTURE WOUND W/ GRAM STAIN [[id number]] Collected:  11/04/18 2355    Order Status:  Completed Specimen:  Wound from Chest Updated:  11/06/18 1119     Significant Indicator NEG     Source WND     Site Left Breast Drainage     Culture Result Wound No growth at 24 hours     Gram Stain Result Few WBCs.  No organisms seen.      Narrative:       Collected By:[id number] JOHN ACEVEDO  Left breast    GRAM STAIN [[id number]] Collected:  11/06/18 0031    Order Status:  Completed Specimen:  Wound Updated:  11/06/18 0855     Significant Indicator .     Source WND     Site Breast Fluid     Gram Stain Result Few WBCs.  No organisms seen.      Narrative:       Collected By:[id number] EDUARD BARNETT  Breast fluid    CULTURE WOUND W/ GRAM STAIN [[id number]] Collected:  11/06/18 0031    Order Status:  Completed Specimen:  Other Updated:  11/06/18 0112    Narrative:       Collected By:[id number] EDUARD BARNETT  Breast fluid    FLUID CULTURE W/GRAM STAIN [[id number]] Collected:  11/06/18 0031    Order Status:  Canceled Specimen:  Other from Other Body Fluid     GRAM STAIN [[id number]] Collected:  11/04/18 2355    Order Status:  Completed Specimen:  Wound Updated:  11/05/18 1425     Significant Indicator .     Source WND     Site Left Breast Drainage     Gram Stain Result Few WBCs.  No organisms seen.      Narrative:       Collected By:[id number] JOHN ACEVEDO  Left breast  "   FLUID CULTURE [[id number]]     Order Status:  No result Specimen:  Body Fluid from Other Body Fluid     BLOOD CULTURE [[id number]] Collected:  11/03/18 0232    Order Status:  Completed Specimen:  Blood from Peripheral Updated:  11/04/18 0617     Significant Indicator NEG     Source BLD     Site PERIPHERAL     Blood Culture No Growth    Note: Blood cultures are incubated for 5 days and  are monitored continuously.Positive blood cultures  are called to the RN and reported as soon as  they are identified.      Narrative:       Per Hospital Policy: Only change Specimen Src: to \"Line\" if  specified by physician order.    BLOOD CULTURE [[id number]] Collected:  11/03/18 0233    Order Status:  Completed Specimen:  Blood from Peripheral Updated:  11/04/18 0617     Significant Indicator NEG     Source BLD     Site PERIPHERAL     Blood Culture No Growth    Note: Blood cultures are incubated for 5 days and  are monitored continuously.Positive blood cultures  are called to the RN and reported as soon as  they are identified.      Narrative:       Per Hospital Policy: Only change Specimen Src: to \"Line\" if  specified by physician order.    Blood Culture [[id number]]     Order Status:  Canceled Specimen:  Blood from Peripheral           Assessment:  Active Hospital Problems    Diagnosis   • Acute mastitis of left breast [N61.0]   • Generalized edema [R60.1]   • Chest pain [R07.9]   • Normocytic anemia [D64.9]   • Left arm swelling [M79.89]   • Thrombocytosis (HCC) [D47.3]       Plan:  Mastitis of the left breast  Afebrile  No leukocytosis  Clinically improving slowly  Bcxs neg  Repeat USG done today shows no drainable abscess  No further breastfeeding  Continue Zyvox for 10 more days    FU ID clinic as needed    Discussed with internal medicine/[facility] resident.  "

## 2018-11-07 ENCOUNTER — TELEPHONE (OUTPATIENT)
Dept: INTERNAL MEDICINE | Facility: MEDICAL CENTER | Age: 43
End: 2018-11-07

## 2018-11-07 NOTE — TELEPHONE ENCOUNTER
I called patient to check if she was able to fill her medication and she stated she picked it up already.      Patient was also transfer to the  to schedule an appointment to see Dr. Vizcarra for a follow up.

## 2018-11-07 NOTE — TELEPHONE ENCOUNTER
DOCUMENTATION OF PAR STATUS:    1. Name of Medication & Dose: Linezolid 600 mg     2. Name of Prescription Coverage Company & phone #: Health plan [facility] [phone] opt [phone]    3. Date Prior Auth Submitted: 11/7/18    4. What information was given to obtain insurance decision? Prior auth form, office and ER notes, insurance and ID     5. Prior Auth Letter Approved or Denied? Pending     6. Action Taken: Pharmacy/Patient Notified: No

## 2018-11-07 NOTE — TELEPHONE ENCOUNTER
Medication was approved 20 tablets for 10 days thorough 11/28/18.    Notified patient brother in law, Gucci. (ok to speak with him per patient chart)    Gucci notified patient and patient approval response was faxed over to her [facility] pharmacy.

## 2018-11-07 NOTE — TELEPHONE ENCOUNTER
----- Message from Benigno Saldivar sent at 11/7/2018  3:01 PM PST -----  Regarding: WILLIAM PT  Contact: [phone]  Patient was in the hospital and Dr Vizcarra told her to make a appt with her sometime by 11/21, however Dr. Vizcarra has no openings till late Dec/ beginning of Jan. I di offer her to see another provider but she declined. Wanted to see what Dr. Vizcarra recommended she do.

## 2018-11-08 LAB
BACTERIA BLD CULT: NORMAL
BACTERIA BLD CULT: NORMAL
BACTERIA WND AEROBE CULT: NORMAL
GRAM STN SPEC: NORMAL
SIGNIFICANT IND 70042: NORMAL
SITE SITE: NORMAL
SOURCE SOURCE: NORMAL

## 2018-11-09 LAB
BACTERIA WND AEROBE CULT: NORMAL
GRAM STN SPEC: NORMAL
SIGNIFICANT IND 70042: NORMAL
SITE SITE: NORMAL
SOURCE SOURCE: NORMAL

## 2018-11-09 NOTE — TELEPHONE ENCOUNTER
Please add her to my schedule for this upcoming Tuesday after my last patient. Its fine by me. Thanks.

## 2018-11-13 ENCOUNTER — OFFICE VISIT (OUTPATIENT)
Dept: INTERNAL MEDICINE | Facility: MEDICAL CENTER | Age: 43
End: 2018-11-13
Payer: MEDICAID

## 2018-11-13 VITALS
DIASTOLIC BLOOD PRESSURE: 76 MMHG | TEMPERATURE: 97.6 F | HEART RATE: 74 BPM | WEIGHT: 135 LBS | BODY MASS INDEX: 23.92 KG/M2 | OXYGEN SATURATION: 97 % | SYSTOLIC BLOOD PRESSURE: 117 MMHG | HEIGHT: 63 IN

## 2018-11-13 DIAGNOSIS — N61.0 MASTITIS: ICD-10-CM

## 2018-11-13 DIAGNOSIS — D50.9 IRON DEFICIENCY ANEMIA, UNSPECIFIED IRON DEFICIENCY ANEMIA TYPE: ICD-10-CM

## 2018-11-13 PROCEDURE — 99214 OFFICE O/P EST MOD 30 MIN: CPT | Performed by: INTERNAL MEDICINE

## 2018-11-13 RX ORDER — FERROUS SULFATE 325(65) MG
TABLET ORAL
Refills: 1 | COMMUNITY
Start: 2018-11-05 | End: 2018-12-12

## 2018-11-13 RX ORDER — LINEZOLID 600 MG/1
TABLET, FILM COATED ORAL
Refills: 0 | COMMUNITY
Start: 2018-11-07 | End: 2018-12-06

## 2018-11-13 NOTE — PROGRESS NOTES
Sofia Viramontes is a 43 y.o. female who is here for hospital discharge follow up.    CC: Hospital discharge follow up for acute mastitis of the left breast     HPI:    Patient is a 43-year-old female who is presenting for hospital discharge follow-up.  Patient was admitted to the hospital after she failed a few antibiotics for her acute mastitis of the left breast.  She was admitted to the hospital and was started on Zyvox and she was also discharged on that medication to complete a course of 10 days.  While she was in the hospital there was not significant improvement so she was referred to get a biopsy of the left breast skin to see if it might be any underlying inflammatory breast cancer or another etiology since she had tried a few different antibiotics and was not improving.  She was seen at the [facility] after she got out of the hospital and had the biopsy there.  She is due to follow-up with a surgeon this coming Friday to discuss the biopsy results.  Today, patient says that she has fever.  She still feels that there is a lot of hardness in the area where she has the mastitis and is slightly painful also because she had the biopsy.  There is no discharge coming out, there is no increased erythema noted.  She has about 3-1/2 days left of the antibiotics.  Last time she was in the clinic, she was complaining of abdominal discomfort, diarrhea, upper respiratory infection symptoms which she says have all resolved.    She also mentions headaches from time to time and says that she has a sharp shooting pain on the right which goes away in about 30 seconds.  She says that this is something that she wants to talk about possibly at the next visit in more detail to see if there is something else that can be done for this.  She says is not a major concern to her at this time.  She does not have any other complaints today and says overall she feels like she is getting better but the progress has  "been slow.      No problem-specific Assessment & Plan notes found for this encounter.      She  has no past medical history on file.    Patient Active Problem List    Diagnosis Date Noted   • Breast pain, left 10/29/2018       Allergies:Clindamycin; Penicillins; and Sulfa drugs    Current Outpatient Prescriptions   Medication Sig Dispense Refill   • linezolid (ZYVOX) 600 MG Tab TK 1 T PO Q 12 H FOR 9 DAYS  0   • ferrous sulfate 325 (65 Fe) MG tablet TK 1 T PO  QAM WITH BREAKFAST  1   • ibuprofen (MOTRIN) 800 MG Tab Take 0.5 Tabs by mouth every 8 hours as needed. 30 Tab 0     No current facility-administered medications for this visit.        Social History   Substance Use Topics   • Smoking status: Never Smoker   • Smokeless tobacco: Never Used   • Alcohol use No       No family history on file.    Review of Systems:   Pertinent positives as stated in HPI, all others reviewed as negative.    Physical Exam:  Blood pressure 117/76, pulse 74, temperature 36.4 °C (97.6 °F), temperature source Temporal, height 1.6 m (5' 3\"), weight 61.2 kg (135 lb), last menstrual period 10/23/2018, SpO2 97 %. Body mass index is 23.91 kg/m².    General Appearance: healthy, alert, no distress, cooperative  Head: Normocephalic. No masses appreciated.   Lungs: Lungs clear to auscultation bilaterally.  Heart: RRR without murmur, gallop, or rubs.  Abdomen: Soft, non-tender. BS normal.   Musculoskeletal: Extremities: Upper and lower extremities appear normal. No deformities, edema.   Psychiatric: Mood appears normal.   Breast: Left breast: Nipple and areola are normal in appearance. Erythema present which has improved since previous visit, no discharge noted, significant inflammation noted with hardning of the skin of the breast, biopsy site with 2 sutures in place. No discharge noted from the biopsy site. Tenderness to palpation over that area.     Assessment/Plan:     1. Mastitis  Continue and finish the course of Zyvox.  Seems to have " improved in terms of symptoms.   There are still apparent changes of the skin noted.  Going to see [facility], Dr. Louie this Friday to go over biopsy results.   Will continue to monitor for any needs.   Biopsy results received after visit was over. Showed skin with moderate to marked mixed acute and chronic inflammation of deep subcutaneous tissue compatible with acute mastitis. No neoplasm seen. Will inform patient.     2. Iron deficiency anemia, unspecified iron deficiency anemia type  On iron supplementation.  Check labs before next visit in Dec.   - IRON/TOTAL IRON BIND; Future  - CBC WITH DIFFERENTIAL; Future  - FERRITIN; Future      Followup: Return in about 4 weeks (around 12/11/2018), or if symptoms worsen or fail to improve.    This note was created using voice recognition software. There may be unintended errors spelling, and grammar or content.

## 2018-11-15 ENCOUNTER — TELEPHONE (OUTPATIENT)
Dept: INTERNAL MEDICINE | Facility: MEDICAL CENTER | Age: 43
End: 2018-11-15

## 2018-11-16 RX ORDER — LINEZOLID 600 MG/1
600 TABLET, FILM COATED ORAL EVERY 12 HOURS
Qty: 20 TAB | Refills: 0 | Status: SHIPPED | OUTPATIENT
Start: 2018-11-16 | End: 2018-12-06

## 2018-11-16 NOTE — TELEPHONE ENCOUNTER
Dr. Vizcarra asked me to call patient and notify her that her biopsy showed no cancer.       I called patient and notified.      Patient also asked if she should continue to take the anitbiotics.     Please advise. Patient stated you can call her back.

## 2018-11-16 NOTE — TELEPHONE ENCOUNTER
Patient called and left a voicemail today stating she went to the surgery doctor's office and got stitches removed. She also received a strong medication from them so doesn't need anything from our side. Just wanted to update

## 2018-11-21 ENCOUNTER — DOCUMENTATION (OUTPATIENT)
Dept: INFECTIOUS DISEASES | Facility: MEDICAL CENTER | Age: 43
End: 2018-11-21

## 2018-11-26 ENCOUNTER — OFFICE VISIT (OUTPATIENT)
Dept: INFECTIOUS DISEASES | Facility: MEDICAL CENTER | Age: 43
End: 2018-11-26
Payer: MEDICAID

## 2018-11-26 VITALS
DIASTOLIC BLOOD PRESSURE: 56 MMHG | OXYGEN SATURATION: 95 % | BODY MASS INDEX: 23.14 KG/M2 | SYSTOLIC BLOOD PRESSURE: 108 MMHG | HEIGHT: 63 IN | WEIGHT: 130.6 LBS | TEMPERATURE: 98.5 F | HEART RATE: 90 BPM

## 2018-11-26 DIAGNOSIS — N61.0 ACUTE MASTITIS OF LEFT BREAST: ICD-10-CM

## 2018-11-26 PROCEDURE — 99214 OFFICE O/P EST MOD 30 MIN: CPT | Performed by: NURSE PRACTITIONER

## 2018-11-26 RX ORDER — DOXYCYCLINE HYCLATE 100 MG
100 TABLET ORAL 2 TIMES DAILY
Qty: 28 TAB | Refills: 0 | Status: SHIPPED | OUTPATIENT
Start: 2018-11-26 | End: 2018-12-06

## 2018-11-26 NOTE — PROGRESS NOTES
Infectious Disease Clinic    Subjective:     Chief Complaint   Patient presents with   • Hospital Follow-up     Mastitis of the left breast     This is my first time meeting Ms. Viramontes.      Interval History: 43-year-old  female who has no significant past medical history.  Had a child 2 years ago and had been breastfeeding since, but was in the process of weaning.  About 2 weeks PTA, she developed a lump in her left axilla, which subsequently spread as redness to her left breast.  It became hard and she had slight fevers and chills.  She was given clindamycin by her OB, but it did not resolve.  She was subsequently given Keflex, but that did not help as well.  Hence, she was sent to the emergency room.    Hospitalized from 11/2/2018 - 11/6/2018.  Ultrasound of the chest on 11/2 and 11/6 did not show any abscess, but showed some edema on 11/2.  The CAT scan of the chest on 11/2 was consistent with enlargement of the left breast tissue without focal enhancing abscess and with skin thickening and underlying subcutaneous fat strandings.  There was also associated left axillary adenopathy.  Discharged home on PO Zyvox x 10 days, estimated end date 11/16/18.  Planned for outpatient biopsy with Dr. Louie on 11/7/18.     11/16/18:  Additional 10 days of PO Zyvox prescribed by Dr. Sultana.      Hospital records reviewed    Today, 11/26/2018: Tolerated the p.o. Zyvox without issue, states that she finished the last dose last night.  Denies feeling generally ill, fevers/chills, general malaise, headache, n/v/d, abdominal pain, chest pain or shortness of breath.  Confirmed once again that she is no longer breastfeeding.  The site of mastitis to the left breast is slightly improved, she states that the redness is a little bit better along with the firmness; however, a large portion remains present.  States there has not been any drainage from the breast.  She was seen by Dr. Louie at [facility] 11/7/18 and had a  "skin punch biopsy performed, in addition to cultures.  The skin biopsy did not show any breast cancer and she was told that the cultures were negative and in line with acute mastitis.    ROS  As documented above in my HPI.    History reviewed. No pertinent past medical history.    Social History   Substance Use Topics   • Smoking status: Never Smoker   • Smokeless tobacco: Never Used   • Alcohol use No       Allergies: Amoxicillin; Clindamycin; Penicillins; Septra [sulfamethoxazole w-trimethoprim]; Sulfa drugs; Amoxicillin; Pcn [penicillins]; and Septra [sulfamethoxazole w-trimethoprim]    Pt's medication and problem list reviewed.     Objective:     PE:  /56 (BP Location: Left arm, Patient Position: Sitting, BP Cuff Size: Adult)   Pulse 90   Temp 36.9 °C (98.5 °F) (Temporal)   Ht 1.6 m (5' 3\")   Wt 59.2 kg (130 lb 9.6 oz)   LMP 10/29/2018   SpO2 95%   BMI 23.13 kg/m²     Vital signs reviewed    Constitutional: Appears well-developed and well-nourished. No acute distress.  Speech fluent. Thin.    Eyes: Conjunctivae normal and EOM are normal. Pupils are equal, round, and reactive to light.   Neck: Trachea midline. Normal range of motion. Neck supple.  No JVD.  Cardiovascular: Normal rate, regular rhythm, normal heart sounds. No murmur, gallop, or friction rub. No edema.  Respiratory: No respiratory distress, unlabored respiratory effort.  Lungs clear to auscultation bilaterally. No wheezes or rales.   Abdomen: Soft, non tender, non-distended. BS + x 4. No masses.   Musculoskeletal: Steady gait.  Normal range of motion.  No joint or bone tenderness, swelling, erythema or deformity.  Skin: Warm and dry. Good turgor.  Left upper breast- moderate erythema extending up toward axilla and down to nipple, induration present, no fluctuation, no openings or drainage, slightly tender to deep palpation, remaining breast soft without erythema.   Neurological: No cranial nerve deficit. Coordination normal.  Sensation " intact.  Psychiatric: Alert and oriented to person, place, and time. Normal mood, calm affect.  Normal behavior and judgment.     Microbiology on 11/7/18:  Left breast culture negative.    Dermatopathology Report on 11/7/18:  Left breast punch biopsy  Skin with moderate to marked mixed acute and chronic inflammation of deep subcutaneous tissue, compatible with acute mastitis.  No neoplasm seen.    Assessment and Plan:   The following treatment plan was discussed with patient at length:    1. Acute mastitis of left breast  doxycycline (VIBRAMYCIN) 100 MG Tab    CBC WITH DIFFERENTIAL    COMP METABOLIC PANEL    WESTERGREN SED RATE    CRP QUANTITIVE (NON-CARDIAC)    -Finished p.o. Zyvox last night.  -Start p.o. doxycycline tonight.  Warned about higher risk for sunburn- advised to cover up and wear sunscreen.  Advised to avoid taking with dairy and calcium based products 2 hours before or after taking Doxy.  Pt instructed to call clinic with any adverse effects or to discontinue the medication and go to the ER should difficulty breathing, SOB, CP, facial swelling and/or gross rash/itching occurs.   -CBC, CMP, ESR and CRP to monitor for leukocytosis, thrombus cytopenia, hepatic nephrotoxicity and trend inflammatory markers.  -If mastitis not improving within the next 10 days, then will consider doing additional imaging-ultrasound and/or CT.     Follow up: 10 days, RTC sooner if needed. FU with PCP for ongoing chronic medical conditions.     HILLARY Mobley.    Case discussed with Dr. Heaton.       Please note that this dictation was created using voice recognition software. I have  worked with technical experts from [facility] to optimize the interface.  I have made every reasonable attempt to correct obvious errors, but there may be errors of grammar and possibly content that I did not discover before finalizing the note.

## 2018-12-06 ENCOUNTER — OFFICE VISIT (OUTPATIENT)
Dept: INFECTIOUS DISEASES | Facility: MEDICAL CENTER | Age: 43
End: 2018-12-06
Payer: MEDICAID

## 2018-12-06 VITALS
OXYGEN SATURATION: 99 % | DIASTOLIC BLOOD PRESSURE: 70 MMHG | HEART RATE: 95 BPM | SYSTOLIC BLOOD PRESSURE: 108 MMHG | WEIGHT: 131.4 LBS | TEMPERATURE: 98.3 F | HEIGHT: 63 IN | BODY MASS INDEX: 23.28 KG/M2

## 2018-12-06 DIAGNOSIS — N61.0 ACUTE MASTITIS OF LEFT BREAST: ICD-10-CM

## 2018-12-06 DIAGNOSIS — R07.89 OTHER CHEST PAIN: ICD-10-CM

## 2018-12-06 PROCEDURE — 99213 OFFICE O/P EST LOW 20 MIN: CPT | Performed by: NURSE PRACTITIONER

## 2018-12-06 RX ORDER — LEVOFLOXACIN 250 MG/1
750 TABLET, FILM COATED ORAL DAILY
Qty: 15 TAB | Refills: 0 | Status: SHIPPED | OUTPATIENT
Start: 2018-12-06 | End: 2018-12-11

## 2018-12-07 ENCOUNTER — APPOINTMENT (OUTPATIENT)
Dept: RADIOLOGY | Facility: MEDICAL CENTER | Age: 43
End: 2018-12-07
Attending: NURSE PRACTITIONER
Payer: MEDICAID

## 2018-12-07 NOTE — PROGRESS NOTES
Infectious Disease Clinic    Subjective:     Chief Complaint   Patient presents with   • Follow-Up     Acute mastitis of left breast     Interval History: 43-year-old  female who has no significant past medical history.  Had a child 2 years ago and had been breastfeeding since, but was in the process of weaning.  About 2 weeks PTA, she developed a lump in her left axilla, which subsequently spread as redness to her left breast.  It became hard and she had slight fevers and chills.  She was given clindamycin by her OB, but it did not resolve.  She was subsequently given Keflex, but that did not help as well.  Hence, she was sent to the emergency room.    Hospitalized from 11/2/2018 - 11/6/2018.  Ultrasound of the chest on 11/2 and 11/6 did not show any abscess, but showed some edema on 11/2.  The CAT scan of the chest on 11/2 was consistent with enlargement of the left breast tissue without focal enhancing abscess and with skin thickening and underlying subcutaneous fat strandings.  There was also associated left axillary adenopathy.  Discharged home on PO Zyvox x 10 days, estimated end date 11/16/18.  Planned for outpatient biopsy with Dr. Louie on 11/7/18.     11/16/18:  Additional 10 days of PO Zyvox prescribed by Dr. Sultana.      11/26/2018: Seen by FRANKLIN Perkins.  Confirmed once again that she is no longer breastfeeding.  The site of mastitis to the left breast is slightly improved, she states that the redness is a little bit better along with the firmness; however, a large portion remains present.  States there has not been any drainage from the breast.  She was seen by Dr. Louie at [facility] 11/7/18 and had a skin punch biopsy performed, in addition to cultures.  The skin biopsy did not show any breast cancer and she was told that the cultures were negative and in line with acute mastitis.  Finished p.o. Zyvox last night.  Start p.o. doxycycline tonight.  If mastitis not improving  "within the next 10 days, then will consider doing additional imaging-ultrasound and/or CT.    Today, 12/6/2018: States that she has been tolerating the p.o. doxycycline without issue.  Denies feeling generally ill, fevers/chills, general malaise, headache, n/v/d, abdominal pain, chest pain or shortness of breath.  Does not feel like she is doing any better on the doxycycline, in fact she feels that she is doing worse.  States that the pain is a 2-4/10 to the left breast, when it was about a 1/10 when she was on the Zyvox.  The redness has spread a little more to the center of the breast and the firmness has also come down a bit to her inside nipple.  She denies there being any discharge from the breast.  She was seen by her OB/GYN yesterday, Dr. Hurtado.  She is being referred to a breast surgeon for a another biopsy.  Her OB/GYN is also sending her for a TB test.      ROS  As documented above in my HPI.    History reviewed. No pertinent past medical history.    Social History   Substance Use Topics   • Smoking status: Never Smoker   • Smokeless tobacco: Never Used   • Alcohol use No       Allergies: Amoxicillin; Clindamycin; Penicillins; Septra [sulfamethoxazole w-trimethoprim]; Sulfa drugs; Amoxicillin; Pcn [penicillins]; and Septra [sulfamethoxazole w-trimethoprim]    Pt's medication and problem list reviewed.     Objective:     PE:  /70 (BP Location: Left arm, Patient Position: Sitting, BP Cuff Size: Adult)   Pulse 95   Temp 36.8 °C (98.3 °F) (Temporal)   Ht 1.6 m (5' 3\")   Wt 59.6 kg (131 lb 6.4 oz)   SpO2 99%   BMI 23.28 kg/m²     Vital signs reviewed    Constitutional: Appears well-developed and well-nourished. No acute distress.  Speech fluent. Thin.    Eyes: Conjunctivae normal and EOM are normal. Pupils are equal, round, and reactive to light.   Neck: Trachea midline. Normal range of motion. Neck supple.  Cardiovascular: Normal rate, regular rhythm, normal heart sounds. No murmur. No " edema.  Respiratory: No respiratory distress, unlabored respiratory effort.  Lungs clear to auscultation bilaterally. No wheezes.   Abdomen: Soft, non tender, non-distended. BS + x 4. No masses.   Musculoskeletal: Steady gait.  Normal range of motion.  No joint or bone tenderness, swelling, erythema or deformity.  Skin: Warm and dry. Good turgor.  Left upper breast- moderate erythema (slightly worse) extending up toward axilla, down to nipple and more medial to mid breast, induration present and slight worse- now extending down into nipple, no fluctuation, no openings or drainage, slightly tender to deep palpation, remaining breast soft without erythema.   Neurological: No cranial nerve deficit. Coordination normal.  Sensation intact.  Psychiatric: Alert and oriented to person, place, and time. Normal mood, calm affect.  Normal behavior and judgment.     Labs:  WBC 4.0  Hemoglobin 11.8  Hematocrit 35.8  Platelet 195  Glucose 91  BUN 10  Creatinine 0.73  Sodium 135  Potassium 3.8  AST 19  ALT 13  Alk phos 45  ESR 6  CRP 0.6    Assessment and Plan:   The following treatment plan was discussed with patient at length:    1. Acute mastitis of left breast  CT-CHEST (THORAX) WITH    levoFLOXacin (LEVAQUIN) 250 MG Tab    -CT chest with contrast to further evaluate left breast.  Scheduled for tomorrow.  -Stop p.o. doxycycline.  -Start p.o. Levaquin 750 mg daily.  Likelihood of mastitis being from a gram negative is slim; however, she did not have any improvement with Zyvox or doxycycline that both cover for gram-positive bugs.   -Strongly agree with seeing a breast surgeon-she is OB/GYN already placed referral.   2. Other chest pain  CT-CHEST (THORAX) WITH    CT as above.     Follow up: 5 days, RTC sooner if needed. FU with PCP for ongoing chronic medical conditions.     HILLARY Mobley.    Case discussed with Dr. Sultana.       Please note that this dictation was created using voice recognition software. I have   worked with technical experts from [facility] to optimize the interface.  I have made every reasonable attempt to correct obvious errors, but there may be errors of grammar and possibly content that I did not discover before finalizing the note.

## 2018-12-10 ENCOUNTER — HOSPITAL ENCOUNTER (OUTPATIENT)
Dept: RADIOLOGY | Facility: MEDICAL CENTER | Age: 43
End: 2018-12-10
Attending: NURSE PRACTITIONER
Payer: MEDICAID

## 2018-12-10 DIAGNOSIS — N61.0 ACUTE MASTITIS OF LEFT BREAST: ICD-10-CM

## 2018-12-10 DIAGNOSIS — R07.89 OTHER CHEST PAIN: ICD-10-CM

## 2018-12-10 PROCEDURE — 700117 HCHG RX CONTRAST REV CODE 255: Performed by: NURSE PRACTITIONER

## 2018-12-10 PROCEDURE — 71260 CT THORAX DX C+: CPT

## 2018-12-10 RX ADMIN — IOHEXOL 100 ML: 350 INJECTION, SOLUTION INTRAVENOUS at 16:36

## 2018-12-11 ENCOUNTER — HOSPITAL ENCOUNTER (OUTPATIENT)
Facility: MEDICAL CENTER | Age: 43
End: 2018-12-11
Attending: NURSE PRACTITIONER
Payer: MEDICAID

## 2018-12-11 ENCOUNTER — OFFICE VISIT (OUTPATIENT)
Dept: INFECTIOUS DISEASES | Facility: MEDICAL CENTER | Age: 43
End: 2018-12-11
Payer: MEDICAID

## 2018-12-11 VITALS
TEMPERATURE: 98.7 F | HEART RATE: 86 BPM | WEIGHT: 134 LBS | BODY MASS INDEX: 23.74 KG/M2 | HEIGHT: 63 IN | DIASTOLIC BLOOD PRESSURE: 60 MMHG | SYSTOLIC BLOOD PRESSURE: 100 MMHG | OXYGEN SATURATION: 96 %

## 2018-12-11 DIAGNOSIS — N61.0 ACUTE MASTITIS OF LEFT BREAST: ICD-10-CM

## 2018-12-11 LAB
FORWARD REASON: SPWHY: NORMAL
FORWARDED TO LAB: SPWHR: NORMAL
SPECIMEN SENT: SPWT1: NORMAL

## 2018-12-11 PROCEDURE — 99213 OFFICE O/P EST LOW 20 MIN: CPT | Performed by: NURSE PRACTITIONER

## 2018-12-11 RX ORDER — LEVOFLOXACIN 250 MG/1
250 TABLET, FILM COATED ORAL DAILY
Qty: 30 TAB | Refills: 0 | Status: SHIPPED | OUTPATIENT
Start: 2018-12-11 | End: 2018-12-19

## 2018-12-11 NOTE — PROGRESS NOTES
Infectious Disease Clinic    Subjective:     Chief Complaint   Patient presents with   • Follow-Up     Acute mastitis of left breast     Interval History: 43-year-old  female who has no significant past medical history.  Had a child 2 years ago and had been breastfeeding since, but was in the process of weaning.  About 2 weeks PTA, she developed a lump in her left axilla, which subsequently spread as redness to her left breast.  It became hard and she had slight fevers and chills.  She was given clindamycin by her OB, but it did not resolve.  She was subsequently given Keflex, but that did not help as well.  Hence, she was sent to the emergency room.    Hospitalized from 11/2/2018 - 11/6/2018.  Ultrasound of the chest on 11/2 and 11/6 did not show any abscess, but showed some edema on 11/2.  The CAT scan of the chest on 11/2 was consistent with enlargement of the left breast tissue without focal enhancing abscess and with skin thickening and underlying subcutaneous fat strandings.  There was also associated left axillary adenopathy.  Discharged home on PO Zyvox x 10 days, estimated end date 11/16/18.  Planned for outpatient biopsy with Dr. Louie on 11/7/18.     11/16/18:  Additional 10 days of PO Zyvox prescribed by Dr. Sultana.      11/26/2018: Seen by FRANKLIN Perkins.  Confirmed once again that she is no longer breastfeeding.  The site of mastitis to the left breast is slightly improved, she states that the redness is a little bit better along with the firmness; however, a large portion remains present.  States there has not been any drainage from the breast.  She was seen by Dr. Louie at [facility] 11/7/18 and had a skin punch biopsy performed, in addition to cultures.  The skin biopsy did not show any breast cancer and she was told that the cultures were negative and in line with acute mastitis.  Finished p.o. Zyvox last night.  Start p.o. doxycycline tonight.  If mastitis not improving  within the next 10 days, then will consider doing additional imaging-ultrasound and/or CT.    12/6/2018: Seen by FRANKLIN Perkins.  Does not feel like she is doing any better on the doxycycline, in fact she feels that she is doing worse.  States that the pain is a 2-4/10 to the left breast, when it was about a 1/10 when she was on the Zyvox.  The redness has spread a little more to the center of the breast and the firmness has also come down a bit to her inside nipple.  She is being referred to a breast surgeon for a another biopsy.     Today, 12/11/2018: Tolerating the p.o. Levaquin without issue, states that her left breast is looking slightly better.  The redness and induration has slightly improved, along with the pain that is averaging 2/10.  Also states that she was seen by her chiropractor earlier this morning, he he is doing laser therapy to help loosen up the tissue.  Unfortunately, last night she noted that 1 of the biopsy sites broke open and drained a little bit of purulent material.  She also noted 2 days ago that she had a little milk leaking out of her nipple.  Denies feeling generally ill, fevers/chills, general malaise, headache, n/v/d, abdominal pain, chest pain or shortness of breath.  She denies any tendon pain, numbness or tingling to her hands or feet, altered mental state or heart racing/heart palpitations.  She is scheduled to see the breast surgeon on Monday, 12/17, to get another biopsy.    ROS  As documented above in my HPI.    No past medical history on file.    Social History   Substance Use Topics   • Smoking status: Never Smoker   • Smokeless tobacco: Never Used   • Alcohol use No       Allergies: Amoxicillin; Clindamycin; Penicillins; Septra [sulfamethoxazole w-trimethoprim]; Sulfa drugs; Amoxicillin; Pcn [penicillins]; and Septra [sulfamethoxazole w-trimethoprim]    Pt's medication and problem list reviewed.     Objective:     PE:  /60   Pulse 86   Temp 37.1 °C (98.7 °F)  "(Temporal)   Ht 1.6 m (5' 3\")   Wt 60.8 kg (134 lb)   SpO2 96%   Breastfeeding? No   BMI 23.74 kg/m²      Vital signs reviewed    Constitutional: Appears well-developed and well-nourished. No acute distress.  Speech fluent. Thin.    Eyes: Conjunctivae normal and EOM are normal. Pupils are equal, round, and reactive to light.   Neck: Trachea midline. Normal range of motion. Neck supple.  No JVD  Cardiovascular: Normal rate, regular rhythm, normal heart sounds. No murmur, gallop or friction rub. No edema.  Respiratory: No respiratory distress, unlabored respiratory effort.  Lungs clear to auscultation bilaterally. No wheezes or rales.   Abdomen: Soft, non tender, non-distended. BS + x 4. No masses.   Musculoskeletal: Steady gait.  Normal range of motion.  Skin: Warm and dry. Good turgor.  Left upper breast- mild erythema (slightly better) with a trace amount extending up toward axilla and just above the lateral portion of the nipple, induration present and slightly better to area of erythema and slightly past toward medial nipple, no fluctuation, now has a Qtip size wound at old biopsy site- wound bed pale yellow and tracts down about 1 cm with serous drainage, slightly tender to deep palpation, remaining breast soft without erythema.   Neurological: No cranial nerve deficit. Coordination normal.    Psychiatric: Alert and oriented to person, place, and time. Normal mood, calm affect.      Imagin/10/2018   CT-CHEST (THORAX) WITH   Impression     Persistent left breast skin thickening with soft tissue infiltration of subcutaneous fat deep to the areas of skin thickening. Persistent adenopathy in the axillary tail of the left breast. Although these findings could be related to persistent mastitis,   the differential includes inflammatory breast cancer.    Given the lack of interval improvement when compared to the previous examination bilateral diagnostic mammogram and left breast ultrasound including the left " axilla are recommended for further evaluation at this time.       Assessment and Plan:   The following treatment plan was discussed with patient at length:    1. Acute mastitis of left breast  levoFLOXacin (LEVAQUIN) 250 MG Tab    CULTURE WOUND W/ GRAM STAIN    -Continue p.o. Levaquin 750 mg daily times 10 days.  -Wound culture obtained.  -Follow-up with breast surgeon on 12/17 as directed.  -Consider follow-up with Dr. Louie who did original biopsy.     Follow up: 10 days, RTC sooner if needed. FU with PCP for ongoing chronic medical conditions.     HILLARY Mobley.    Case discussed with Dr. Sultana.       Please note that this dictation was created using voice recognition software. I have  worked with technical experts from [employer] to optimize the interface.  I have made every reasonable attempt to correct obvious errors, but there may be errors of grammar and possibly content that I did not discover before finalizing the note.

## 2018-12-12 ENCOUNTER — OFFICE VISIT (OUTPATIENT)
Dept: INTERNAL MEDICINE | Facility: MEDICAL CENTER | Age: 43
End: 2018-12-12
Payer: MEDICAID

## 2018-12-12 ENCOUNTER — HOSPITAL ENCOUNTER (OUTPATIENT)
Dept: RADIOLOGY | Facility: MEDICAL CENTER | Age: 43
End: 2018-12-12

## 2018-12-12 VITALS
OXYGEN SATURATION: 97 % | DIASTOLIC BLOOD PRESSURE: 77 MMHG | HEART RATE: 90 BPM | BODY MASS INDEX: 23.74 KG/M2 | SYSTOLIC BLOOD PRESSURE: 120 MMHG | HEIGHT: 63 IN | TEMPERATURE: 98.6 F | WEIGHT: 134 LBS

## 2018-12-12 DIAGNOSIS — N61.0 ACUTE MASTITIS OF LEFT BREAST: ICD-10-CM

## 2018-12-12 DIAGNOSIS — G44.89 OTHER HEADACHE SYNDROME: ICD-10-CM

## 2018-12-12 DIAGNOSIS — Z00.00 HEALTHCARE MAINTENANCE: ICD-10-CM

## 2018-12-12 DIAGNOSIS — N64.4 BREAST PAIN, LEFT: ICD-10-CM

## 2018-12-12 PROCEDURE — 99214 OFFICE O/P EST MOD 30 MIN: CPT | Performed by: INTERNAL MEDICINE

## 2018-12-13 ENCOUNTER — HOSPITAL ENCOUNTER (OUTPATIENT)
Dept: RADIOLOGY | Facility: MEDICAL CENTER | Age: 43
End: 2018-12-13
Attending: SURGERY
Payer: MEDICAID

## 2018-12-13 DIAGNOSIS — N61.0 MASTITIS, LEFT, ACUTE: ICD-10-CM

## 2018-12-13 PROCEDURE — 76642 ULTRASOUND BREAST LIMITED: CPT | Mod: LT

## 2018-12-13 NOTE — PATIENT INSTRUCTIONS
Keep an eye on the headaches. If it becomes more frequent or bothersome, let me know.  Follow up with Infectious disease, Breast surgeon.   Keep me updated.

## 2018-12-13 NOTE — PROGRESS NOTES
Sofia Viramontes is a 43 y.o. female who is here for routine follow up.    CC: Follow up mastitis, headaches    HPI:    Patient is a 43 year old female who is here for a follow up and talk about her headaches. Patient has a history of mastitis. Patient was seen as outpatient and then was admitted because of the mastitis and was placed on Zyvox.  There were concernsThere were concerns about possible inflammatory breast cancer because it was taking a long time to show any signs of improvement. Patient completed the course of the Zyvox and had the biopsy done as well which didn't show any signs of cancer. She continued to have skin changes and symptoms. She was seen by ID who initially placed her on Doxycycline and then changed it to Levaquin after that. She says she is doing better on the Levaquin. She also had the CT chest which shows persistent left breast skin thickening with soft tissue infiltration of subcutaneous fat deep to the areas of skin thickening. Also mentioned persistent adenopathy in the axially tail of the left breast. She is going to be follow up to get another Ultrasound of the left breast.     She did follow up with the surgeon, Dr. Louie who did the initial biopsy. She said one of the sites had opened up but seems to be doing well now.     Headaches: Have gotten better. In the Summer and fall, they were happening more frequently. Now, it is about once every 2-3 weeks. Takes nothing for the headaches. Lasts 20-30 seconds. Not interfering with her activities of daily living. No radiation of the pain. The headache is described as a sudden sharp shooting pain behind the right ear and random areas of the head. No trauma to the head. No photophobia, no nasal congestion, no tearing of the eyes. No ear fullness but does report some ear pain when the headache comes. No changes in hearing, no discharge from the ear. No syncopal episode. No numbness or tingling. Has a history of ocular migraines  before and went away on their own without any treatment.  She does report having dizziness 10 years ago and she underwent imaging and didn't find anything during that time. She doesn't report any jerking movements during these episodes.     No problem-specific Assessment & Plan notes found for this encounter.      She  has no past medical history on file.    Patient Active Problem List    Diagnosis Date Noted   • Acute mastitis of left breast 11/02/2018     Priority: High   • Normocytic anemia 11/02/2018     Priority: Low   • Other headache syndrome 12/12/2018   • Left arm swelling 11/04/2018   • Thrombocytosis (HCC) 11/02/2018   • Breast pain, left 10/29/2018       Allergies:Amoxicillin; Clindamycin; Penicillins; Septra [sulfamethoxazole w-trimethoprim]; Sulfa drugs; Amoxicillin; Clindamycin; Pcn [penicillins]; Penicillins; Septra [sulfamethoxazole w-trimethoprim]; and Sulfa drugs    Current Outpatient Prescriptions   Medication Sig Dispense Refill   • levoFLOXacin (LEVAQUIN) 250 MG Tab Take 1 Tab by mouth every day for 30 days. 30 Tab 0   • ferrous sulfate 325 (65 Fe) MG tablet Take 1 Tab by mouth every morning with breakfast. 60 Tab 1   • multivitamin (THERAGRAN) Tab Take 1 Tab by mouth 1 time daily as needed.     • acetaminophen (TYLENOL) 500 MG Tab Take 1,000 mg by mouth 2 times a day as needed.     • ibuprofen (MOTRIN) 200 MG Tab Take 600 mg by mouth 2 times a day as needed.     • Diaper Rash Products (DESITIN EX) Apply 1 Each to affected area(s) 1 time daily as needed.     • ibuprofen (MOTRIN) 800 MG Tab Take 0.5 Tabs by mouth every 8 hours as needed. 30 Tab 0     No current facility-administered medications for this visit.        Social History   Substance Use Topics   • Smoking status: Never Smoker   • Smokeless tobacco: Never Used   • Alcohol use No       Family History   Problem Relation Age of Onset   • Hypertension Mother    • Arthritis Father      Review of Systems:   Pertinent positives as stated in  "HPI, all others reviewed as negative.    Physical Exam:  Blood pressure 120/77, pulse 90, temperature 37 °C (98.6 °F), temperature source Temporal, height 1.6 m (5' 3\"), weight 60.8 kg (134 lb), SpO2 97 %, not currently breastfeeding. Body mass index is 23.74 kg/m².    General Appearance: healthy, alert, no distress, cooperative  Skin: No rashes or lesions.  Head: Normocephalic. No masses appreciated.   Eyes: PERRLA.  Oropharynx: Oropharynx moist and without lesion.  Lungs: Lungs clear to auscultation bilaterally.  Heart: RRR without murmur, gallop, or rubs.  Breast: Left breast with area of erythema, thickening of the skin between the 12 o'clock position and 3 o'clock position which seems to be slightly better than before.   Abdomen: Soft, non-tender. BS normal.   Musculoskeletal: Extremities: Upper and lower extremities appear normal. No deformities, edema.   Peripheral Pulses: Pulses: radial=4/4, dorsalis pedis=4/4  Psychiatric: Mood appears normal.     Assessment/Plan:     1. Acute mastitis of left breast  Sees ID. Was placed on Doxy and then Levaquin. Having some relief with the Levaquin.  Had CT chest and has a US breast ordered.  Will see if there is anything else that can be done because of the slow process of healing, so will make a referral for her to see ID. That is something patient would like to do as well. Will see if there is any other option for this patient as it has been such an extensive process with a lot of improvement in her condition.   - REFERRAL TO INFECTIOUS DISEASE    2. Breast pain, left  See above.   Is also going to be seeing a breast surgeon.   - REFERRAL TO INFECTIOUS DISEASE    3. Other headache syndrome  Has decreased in frequency.  Lasts 20 sec and it is not debilitating.  No concerning signs at this time.  Will continue to monitor. If the frequency increases, there is a change in the headache, will need to obtain an MRI brain.     4. Healthcare maintenance  Had a PAP smear.  Labs " are recent.  State she had the influenza vaccine during her recent hospital lstay.       Followup: Return in about 6 months (around 6/12/2019), or if symptoms worsen or fail to improve.    This note was created using voice recognition software. There may be unintended errors spelling, and grammar or content.

## 2018-12-15 ENCOUNTER — DOCUMENTATION (OUTPATIENT)
Dept: INFECTIOUS DISEASES | Facility: MEDICAL CENTER | Age: 43
End: 2018-12-15

## 2018-12-16 NOTE — PROGRESS NOTES
"Text from message service that patient has \"tingling fingers\" and \"spots on her eye\" .  She is currently on levofloxacin for mastitis.     I call patient but went to her voicemail.  I advised her to stop taking the levofloxacin and to follow-up for breast bx on Monday as planned.  Advised her to call our office on Monday to arrange for an appointment.  Also advised her that if she has high fevers, significant pus or drainage, pain or other worrisome signs of infection to to to the ED.    Araceli Mcnulty MD      "

## 2018-12-18 ENCOUNTER — PATIENT MESSAGE (OUTPATIENT)
Dept: INTERNAL MEDICINE | Facility: MEDICAL CENTER | Age: 43
End: 2018-12-18

## 2018-12-18 NOTE — TELEPHONE ENCOUNTER
From: Sofia Viramontes  To: Juan Vizcarra M.D.  Sent: 12/18/2018 10:17 AM PST  Subject: Non-Urgent Medical Question    Hi Dr. Vizcarra,    I just met with Dr. Zuniga at [facility]. She said my last ultrasound showed a small pocket of fluid that she wants to go in and surgically drain. I'm scheduled for surgery this Thursday at 6am. I also have an appointment with nurse Tho at Dr. Sultana's office this Thursday for an ID follow-up. I stopped taking the Levaquin on Saturday because I'd been having some tingling and numbness in my hands and a few flashing lights in my vision the night before, so I'm not currently taking any antibiotics.     Would it be possible to go and see Dr. Figueroa for my follow-up instead of doing it with Dr. Sultana's office? (I haven't gotten a call yet to set up an appointment with Dr. Figueroa.)     Thank you so much!    -Sofia

## 2018-12-19 DIAGNOSIS — Z01.812 PRE-OPERATIVE LABORATORY EXAMINATION: ICD-10-CM

## 2018-12-19 LAB — HCG SERPL QL: NEGATIVE

## 2018-12-19 PROCEDURE — 84703 CHORIONIC GONADOTROPIN ASSAY: CPT

## 2018-12-19 PROCEDURE — 36415 COLL VENOUS BLD VENIPUNCTURE: CPT

## 2018-12-19 RX ORDER — ASCORBIC ACID 500 MG
500 TABLET ORAL DAILY
COMMUNITY
End: 2022-05-13

## 2018-12-20 ENCOUNTER — HOSPITAL ENCOUNTER (OUTPATIENT)
Facility: MEDICAL CENTER | Age: 43
End: 2018-12-20
Attending: SURGERY | Admitting: SURGERY
Payer: MEDICAID

## 2018-12-20 ENCOUNTER — OFFICE VISIT (OUTPATIENT)
Dept: INFECTIOUS DISEASES | Facility: MEDICAL CENTER | Age: 43
End: 2018-12-20
Payer: MEDICAID

## 2018-12-20 VITALS
DIASTOLIC BLOOD PRESSURE: 75 MMHG | OXYGEN SATURATION: 100 % | BODY MASS INDEX: 24.1 KG/M2 | HEIGHT: 63 IN | SYSTOLIC BLOOD PRESSURE: 117 MMHG | HEART RATE: 74 BPM | RESPIRATION RATE: 15 BRPM | WEIGHT: 136.02 LBS | TEMPERATURE: 98.9 F

## 2018-12-20 VITALS
DIASTOLIC BLOOD PRESSURE: 70 MMHG | OXYGEN SATURATION: 97 % | TEMPERATURE: 97.9 F | SYSTOLIC BLOOD PRESSURE: 118 MMHG | HEART RATE: 73 BPM | HEIGHT: 63 IN | WEIGHT: 136 LBS | BODY MASS INDEX: 24.1 KG/M2

## 2018-12-20 DIAGNOSIS — N61.0 ACUTE MASTITIS OF LEFT BREAST: ICD-10-CM

## 2018-12-20 DIAGNOSIS — G89.18 PAIN, POSTOPERATIVE, ACUTE: ICD-10-CM

## 2018-12-20 LAB
GRAM STN SPEC: NORMAL
PATHOLOGY CONSULT NOTE: NORMAL
SIGNIFICANT IND 70042: NORMAL
SITE SITE: NORMAL
SOURCE SOURCE: NORMAL

## 2018-12-20 PROCEDURE — 160048 HCHG OR STATISTICAL LEVEL 1-5: Performed by: SURGERY

## 2018-12-20 PROCEDURE — 160038 HCHG SURGERY MINUTES - EA ADDL 1 MIN LEVEL 2: Performed by: SURGERY

## 2018-12-20 PROCEDURE — 160027 HCHG SURGERY MINUTES - 1ST 30 MINS LEVEL 2: Performed by: SURGERY

## 2018-12-20 PROCEDURE — 501838 HCHG SUTURE GENERAL: Performed by: SURGERY

## 2018-12-20 PROCEDURE — 87205 SMEAR GRAM STAIN: CPT

## 2018-12-20 PROCEDURE — 160046 HCHG PACU - 1ST 60 MINS PHASE II: Performed by: SURGERY

## 2018-12-20 PROCEDURE — 88305 TISSUE EXAM BY PATHOLOGIST: CPT

## 2018-12-20 PROCEDURE — 700111 HCHG RX REV CODE 636 W/ 250 OVERRIDE (IP): Performed by: ANESTHESIOLOGY

## 2018-12-20 PROCEDURE — A9270 NON-COVERED ITEM OR SERVICE: HCPCS | Performed by: ANESTHESIOLOGY

## 2018-12-20 PROCEDURE — 87070 CULTURE OTHR SPECIMN AEROBIC: CPT

## 2018-12-20 PROCEDURE — 160009 HCHG ANES TIME/MIN: Performed by: SURGERY

## 2018-12-20 PROCEDURE — 700101 HCHG RX REV CODE 250

## 2018-12-20 PROCEDURE — 700111 HCHG RX REV CODE 636 W/ 250 OVERRIDE (IP)

## 2018-12-20 PROCEDURE — 160047 HCHG PACU  - EA ADDL 30 MINS PHASE II: Performed by: SURGERY

## 2018-12-20 PROCEDURE — 160035 HCHG PACU - 1ST 60 MINS PHASE I: Performed by: SURGERY

## 2018-12-20 PROCEDURE — 87075 CULTR BACTERIA EXCEPT BLOOD: CPT

## 2018-12-20 PROCEDURE — 160002 HCHG RECOVERY MINUTES (STAT): Performed by: SURGERY

## 2018-12-20 PROCEDURE — 99213 OFFICE O/P EST LOW 20 MIN: CPT | Performed by: NURSE PRACTITIONER

## 2018-12-20 PROCEDURE — 160025 RECOVERY II MINUTES (STATS): Performed by: SURGERY

## 2018-12-20 PROCEDURE — 700102 HCHG RX REV CODE 250 W/ 637 OVERRIDE(OP): Performed by: ANESTHESIOLOGY

## 2018-12-20 RX ORDER — OXYCODONE HCL 5 MG/5 ML
5 SOLUTION, ORAL ORAL
Status: COMPLETED | OUTPATIENT
Start: 2018-12-20 | End: 2018-12-20

## 2018-12-20 RX ORDER — HYDROMORPHONE HYDROCHLORIDE 1 MG/ML
0.4 INJECTION, SOLUTION INTRAMUSCULAR; INTRAVENOUS; SUBCUTANEOUS
Status: DISCONTINUED | OUTPATIENT
Start: 2018-12-20 | End: 2018-12-20 | Stop reason: HOSPADM

## 2018-12-20 RX ORDER — DIPHENHYDRAMINE HYDROCHLORIDE 50 MG/ML
12.5 INJECTION INTRAMUSCULAR; INTRAVENOUS
Status: DISCONTINUED | OUTPATIENT
Start: 2018-12-20 | End: 2018-12-20 | Stop reason: HOSPADM

## 2018-12-20 RX ORDER — SODIUM CHLORIDE, SODIUM LACTATE, POTASSIUM CHLORIDE, CALCIUM CHLORIDE 600; 310; 30; 20 MG/100ML; MG/100ML; MG/100ML; MG/100ML
INJECTION, SOLUTION INTRAVENOUS ONCE
Status: COMPLETED | OUTPATIENT
Start: 2018-12-20 | End: 2018-12-20

## 2018-12-20 RX ORDER — HYDRALAZINE HYDROCHLORIDE 20 MG/ML
5 INJECTION INTRAMUSCULAR; INTRAVENOUS
Status: DISCONTINUED | OUTPATIENT
Start: 2018-12-20 | End: 2018-12-20 | Stop reason: HOSPADM

## 2018-12-20 RX ORDER — ONDANSETRON 2 MG/ML
4 INJECTION INTRAMUSCULAR; INTRAVENOUS
Status: DISCONTINUED | OUTPATIENT
Start: 2018-12-20 | End: 2018-12-20 | Stop reason: HOSPADM

## 2018-12-20 RX ORDER — HALOPERIDOL 5 MG/ML
1 INJECTION INTRAMUSCULAR
Status: DISCONTINUED | OUTPATIENT
Start: 2018-12-20 | End: 2018-12-20 | Stop reason: HOSPADM

## 2018-12-20 RX ORDER — MEPERIDINE HYDROCHLORIDE 25 MG/ML
12.5 INJECTION INTRAMUSCULAR; INTRAVENOUS; SUBCUTANEOUS
Status: DISCONTINUED | OUTPATIENT
Start: 2018-12-20 | End: 2018-12-20 | Stop reason: HOSPADM

## 2018-12-20 RX ORDER — OXYCODONE HCL 5 MG/5 ML
10 SOLUTION, ORAL ORAL
Status: COMPLETED | OUTPATIENT
Start: 2018-12-20 | End: 2018-12-20

## 2018-12-20 RX ORDER — LORAZEPAM 2 MG/ML
0.5 INJECTION INTRAMUSCULAR
Status: DISCONTINUED | OUTPATIENT
Start: 2018-12-20 | End: 2018-12-20 | Stop reason: HOSPADM

## 2018-12-20 RX ORDER — HYDROMORPHONE HYDROCHLORIDE 1 MG/ML
0.1 INJECTION, SOLUTION INTRAMUSCULAR; INTRAVENOUS; SUBCUTANEOUS
Status: DISCONTINUED | OUTPATIENT
Start: 2018-12-20 | End: 2018-12-20 | Stop reason: HOSPADM

## 2018-12-20 RX ORDER — HYDROMORPHONE HYDROCHLORIDE 1 MG/ML
0.2 INJECTION, SOLUTION INTRAMUSCULAR; INTRAVENOUS; SUBCUTANEOUS
Status: DISCONTINUED | OUTPATIENT
Start: 2018-12-20 | End: 2018-12-20 | Stop reason: HOSPADM

## 2018-12-20 RX ORDER — SODIUM CHLORIDE, SODIUM LACTATE, POTASSIUM CHLORIDE, CALCIUM CHLORIDE 600; 310; 30; 20 MG/100ML; MG/100ML; MG/100ML; MG/100ML
INJECTION, SOLUTION INTRAVENOUS CONTINUOUS
Status: DISCONTINUED | OUTPATIENT
Start: 2018-12-20 | End: 2018-12-20 | Stop reason: HOSPADM

## 2018-12-20 RX ORDER — HYDROCODONE BITARTRATE AND ACETAMINOPHEN 5; 325 MG/1; MG/1
1-2 TABLET ORAL EVERY 4 HOURS PRN
Qty: 30 TAB | Refills: 0 | Status: SHIPPED | OUTPATIENT
Start: 2018-12-20 | End: 2018-12-22 | Stop reason: CLARIF

## 2018-12-20 RX ADMIN — OXYCODONE HYDROCHLORIDE 10 MG: 5 SOLUTION ORAL at 08:28

## 2018-12-20 RX ADMIN — SODIUM CHLORIDE, SODIUM LACTATE, POTASSIUM CHLORIDE, CALCIUM CHLORIDE: 600; 310; 30; 20 INJECTION, SOLUTION INTRAVENOUS at 07:27

## 2018-12-20 ASSESSMENT — PAIN SCALES - GENERAL
PAINLEVEL_OUTOF10: 2
PAINLEVEL_OUTOF10: 1

## 2018-12-20 NOTE — DISCHARGE SUMMARY
D/C instructions:    1. DIET: Upon discharge from the hospital you may resume your normal preoperative diet.    2. ACTIVITIES: After discharge from the hospital, you may resume full routine activities.   3. DRIVING: You may drive whenever you are off pain medications and are able to perform the activities needed to drive, i.e. turning, bending, twisting, etc.    4. BATHING: You may get the wound wet at any time after leaving the hospital. OK to shower and bathe and replace gauze dressings.     5. BOWEL FUNCTION: Constipation is common after an operation, especially with pain medications. The combination of pain medication and decreased activity level can cause constipation in otherwise normal patients. If you feel this is occurring, take a laxative (Milk of Magnesia, Ex-Lax, Senokot, etc.) until the problem has resolved.    6. PAIN MEDICATION: You will be given a prescription for pain medication at discharge. Please take these as directed. It is important to remember not to take medications on an empty stomach as this may cause nausea.     It is also helpful to take other non-narcotic over the counter medications to help with pain control and to decrease the need for narcotic medications. I recommend ibuprofen, taken every 8 hours, dosing as directed on the medication bottle.    It is useful to slowly decrease the number of narcotic pain medications you take starting the first day after surgery, as you are able. If you need a refill, Dr. Zuniga's office will provide you with one refill at your post-operative visit. After this, no more narcotic pain medications will be given.     7.CALL IF YOU HAVE: (1) Fevers to more than 1010 F, (2) Unusual chest or leg pain, (3) Drainage or fluid from incision that may be foul smelling, increased tenderness or soreness at the wound or the wound edges are no longer together, redness or swelling at the incision site. Please do not hesitate to call with any other questions.     8.  APPOINTMENT: Contact our office at [phone] for a follow-up appointment in 1 weeks following your procedure.    If you have any additional questions, please do not hesitate to call the office and speak to either myself or the physician on call.    Office address:  [address] [place] [address] [id number]  [place] [address]      Veronica Zuniga MD  [facility]  [phone]

## 2018-12-20 NOTE — DISCHARGE INSTRUCTIONS
ACTIVITY: Rest and take it easy for the first 24 hours.  A responsible adult is recommended to remain with you during that time.  It is normal to feel sleepy.  We encourage you to not do anything that requires balance, judgment or coordination.    MILD FLU-LIKE SYMPTOMS ARE NORMAL. YOU MAY EXPERIENCE GENERALIZED MUSCLE ACHES, THROAT IRRITATION, HEADACHE AND/OR SOME NAUSEA.    FOR 24 HOURS DO NOT:  Drive, operate machinery or run household appliances.  Drink beer or alcoholic beverages.   Make important decisions or sign legal documents.    SPECIAL INSTRUCTIONS: *  D/C instructions:     1. DIET: Upon discharge from the hospital you may resume your normal preoperative diet.     2. ACTIVITIES: After discharge from the hospital, you may resume full routine activities.   3. DRIVING: You may drive whenever you are off pain medications and are able to perform the activities needed to drive, i.e. turning, bending, twisting, etc.     4. BATHING: You may get the wound wet at any time after leaving the hospital. OK to shower and bathe and replace gauze dressings.      5. BOWEL FUNCTION: Constipation is common after an operation, especially with pain medications. The combination of pain medication and decreased activity level can cause constipation in otherwise normal patients. If you feel this is occurring, take a laxative (Milk of Magnesia, Ex-Lax, Senokot, etc.) until the problem has resolved.     6. PAIN MEDICATION: You will be given a prescription for pain medication at discharge. Please take these as directed. It is important to remember not to take medications on an empty stomach as this may cause nausea.      It is also helpful to take other non-narcotic over the counter medications to help with pain control and to decrease the need for narcotic medications. I recommend ibuprofen, taken every 8 hours, dosing as directed on the medication bottle.     It is useful to slowly decrease the number of narcotic pain  medications you take starting the first day after surgery, as you are able. If you need a refill, Dr. Zuniga's office will provide you with one refill at your post-operative visit. After this, no more narcotic pain medications will be given.      7.CALL IF YOU HAVE: (1) Fevers to more than 1010 F, (2) Unusual chest or leg pain, (3) Drainage or fluid from incision that may be foul smelling, increased tenderness or soreness at the wound or the wound edges are no longer together, redness or swelling at the incision site. Please do not hesitate to call with any other questions.      8. APPOINTMENT: Contact our office at [phone] for a follow-up appointment in 1 weeks following your procedure.     If you have any additional questions, please do not hesitate to call the office and speak to either myself or the physician on call.     Office address:  [address] [place] [address] [id number]  [place] [address]        Veronica Zuniga MD  [facility]  [phone]            Routing History                  **    DIET: To avoid nausea, slowly advance diet as tolerated, avoiding spicy or greasy foods for the first day.  Add more substantial food to your diet according to your physician's instructions.  Babies can be fed formula or breast milk as soon as they are hungry.  INCREASE FLUIDS AND FIBER TO AVOID CONSTIPATION.    SURGICAL DRESSING/BATHING: *SEE ABOVE**    FOLLOW-UP APPOINTMENT:  A follow-up appointment should be arranged with your doctor in *FOLLOW UP WITH DR. ZUNIGA**; call to schedule.    You should CALL YOUR PHYSICIAN if you develop:  Fever greater than 101 degrees F.  Pain not relieved by medication, or persistent nausea or vomiting.  Excessive bleeding (blood soaking through dressing) or unexpected drainage from the wound.  Extreme redness or swelling around the incision site, drainage of pus or foul smelling drainage.  Inability to urinate or empty your bladder within 8 hours.  Problems with breathing or chest  pain.    You should call 911 if you develop problems with breathing or chest pain.  If you are unable to contact your doctor or surgical center, you should go to the nearest emergency room or urgent care center.  Physician's telephone #: *DR. GIVENS [phone]**    If any questions arise, call your doctor.  If your doctor is not available, please feel free to call the Surgical Center at [phone].  The Center is open Monday through Friday from 7AM to 7PM.  You can also call the HEALTH HOTLINE open 24 hours/day, 7 days/week and speak to a nurse at [phone], or toll free at [phone].    A registered nurse may call you a few days after your surgery to see how you are doing after your procedure.    MEDICATIONS: Resume taking daily medication.  Take prescribed pain medication with food.  If no medication is prescribed, you may take non-aspirin pain medication if needed.  PAIN MEDICATION CAN BE VERY CONSTIPATING.  Take a stool softener or laxative such as senokot, pericolace, or milk of magnesia if needed.    Prescription given for *NORCO**.  Last pain medication given at *________8:30 AM____________________**.    If your physician has prescribed pain medication that includes Acetaminophen (Tylenol), do not take additional Acetaminophen (Tylenol) while taking the prescribed medication.    Depression / Suicide Risk    As you are discharged from this [facility] facility, it is important to learn how to keep safe from harming yourself.    Recognize the warning signs:  · Abrupt changes in personality, positive or negative- including increase in energy   · Giving away possessions  · Change in eating patterns- significant weight changes-  positive or negative  · Change in sleeping patterns- unable to sleep or sleeping all the time   · Unwillingness or inability to communicate  · Depression  · Unusual sadness, discouragement and loneliness  · Talk of wanting to die  · Neglect of personal  appearance   · Rebelliousness- reckless behavior  · Withdrawal from people/activities they love  · Confusion- inability to concentrate     If you or a loved one observes any of these behaviors or has concerns about self-harm, here's what you can do:  · Talk about it- your feelings and reasons for harming yourself  · Remove any means that you might use to hurt yourself (examples: pills, rope, extension cords, firearm)  · Get professional help from the community (Mental Health, Substance Abuse, psychological counseling)  · Do not be alone:Call your Safe Contact- someone whom you trust who will be there for you.  · Call your local CRISIS HOTLINE 340-4017 or 736-812-9940  · Call your local Children's Mobile Crisis Response Team [place] (212) 736-4646 or www.Emergency CallWorks  · Call the toll free National Suicide Prevention Hotlines   · National Suicide Prevention Lifeline 240-857-WKJF (0742)  · National Hope Line Network 800-SUICIDE (318-3474)

## 2018-12-20 NOTE — OP REPORT
Operative Report    Date: 12/20/2018    Surgeon: Veronica Zuniga M.D.    Pre-operative Diagnosis: left breast abscess    Post-operative Diagnosis: same     Procedure: incision and drainage left breast abscess, excisional biopsy    Findings: minimal purulent drainage, friable chronically infected tissue. Skin and breast tissue sent for pathology.    Procedure in detail: The patient was identified in the pre-operative holding area and brought to the operating room. Correct side and site were identified. Pre-operative antibiotics of ancef were administered prior to the procedure. GETA was smoothly induced. The patient was prepped and draped in the usual sterile fashion.    The breast was massaged and a small amount of purulent material was expressed from the prior biopsy site, this was sent for culture. The wounds were explored with hemostats and no deeper abscesses were encountered. The wounds were copiously irrigated with saline. The upper biopsy site was extended with a knife and a small piece of breast tissue including skin was sent for pathology. Hemostasis was achieved. The wound was irrigated a final time, and a penrose brought between the two wounds, and secured with nylon.     The patient was awakened from general anesthetic, and was taken to the recovery room in stable condition.    Sponge and needle counts were correct at the end of the case.     Specimen:   1. Fluid for culture  2. Left breast biopsy    EBL: 25 mL    Drains: penrose L breast    Dispo: stable, extubated, to PACU    Veronica Zuniga M.D.  [facility]  [phone]

## 2018-12-20 NOTE — OR NURSING
0806  RECEIVED PATIENT FROM OR.  REPORT FROM DR. DE LA ROSA.  LMA IN PLACE.  RESPIRATIONS ARE EVEN AND UNLABORED.  GAUZE AND TAPE TO LEFT BREAST WITH SMALL AMOUNT OF BLOODY DRAINAGE NOTED.    0823  LMA DC'D WITHOUT DIFFICULTY.    0828  MEDICATED WITH IV AND PO PAIN MEDICATION FOR C/O LEFT BREAST PAIN 5.    0920  REPORT TO NAZANIN BARRIOS.    0938  RECEIVED REPORT FROM NAZANIN BARRIOS.  RESUMED CARE OF PATIENT.    1015  SISTER BRYANT TO BEDSIDE.     1109  UP GETTING DRESSED.    1121  DISCHARGED.  DISCHARGE INSTRUCTIONS GIVEN TO PATIENT AND HER SISTER.  A VERBAL UNDERSTANDING OF ALL INSTRUCTIONS WAS STATED.  PATIENT TAKING PO AND AMBULATING WITHOUT DIFFICULTY.  PATIENT STATES SHE IS READY TO GO HOME.

## 2018-12-20 NOTE — PROGRESS NOTES
[facility]    Subjective:     Chief Complaint   Patient presents with   • Follow-Up     Acute mastitis of left breast     Interval History: 43-year-old  female who has no significant past medical history.  Had a child 2 years ago and had been breastfeeding since, but was in the process of weaning.  About 2 weeks PTA, she developed a lump in her left axilla, which subsequently spread as redness to her left breast.  It became hard and she had slight fevers and chills.  She was given clindamycin by her OB, but it did not resolve.  She was subsequently given Keflex, but that did not help as well.  Hence, she was sent to the emergency room.    Hospitalized from 11/2/2018 - 11/6/2018.  Ultrasound of the chest on 11/2 and 11/6 did not show any abscess, but showed some edema on 11/2.  The CAT scan of the chest on 11/2 was consistent with enlargement of the left breast tissue without focal enhancing abscess and with skin thickening and underlying subcutaneous fat strandings.  There was also associated left axillary adenopathy.  Discharged home on PO Zyvox x 10 days, estimated end date 11/16/18.  Planned for outpatient biopsy with Dr. Louie on 11/7/18.     11/16/18:  Additional 10 days of PO Zyvox prescribed by Dr. Sultana.      11/26/2018: Seen by RFANKLIN Perkins.  Confirmed once again that she is no longer breastfeeding.  The site of mastitis to the left breast is slightly improved, she states that the redness is a little bit better along with the firmness; however, a large portion remains present.  States there has not been any drainage from the breast.  She was seen by Dr. Louie at [facility] 11/7/18 and had a skin punch biopsy performed, in addition to cultures.  The skin biopsy did not show any breast cancer and she was told that the cultures were negative and in line with acute mastitis.  Finished p.o. Zyvox last night.  Start p.o. doxycycline tonight.  If mastitis not improving  within the next 10 days, then will consider doing additional imaging-ultrasound and/or CT.    12/6/2018: Seen by FRANKLIN Perkins.  Does not feel like she is doing any better on the doxycycline, in fact she feels that she is doing worse.  States that the pain is a 2-4/10 to the left breast, when it was about a 1/10 when she was on the Zyvox.  The redness has spread a little more to the center of the breast and the firmness has also come down a bit to her inside nipple.  She is being referred to a breast surgeon for a another biopsy.     12/11/2018: Seen by FRANKLIN Perkins.  Unfortunately, last night she noted that 1 of the biopsy sites broke open and drained a little bit of purulent material.  Continue p.o. Levaquin 750 mg daily times 10 days.  Wound culture obtained.  She is scheduled to see the breast surgeon on Monday, 12/17, to get another biopsy.    12/15/18: Patient called the office reporting tingling of fingers and spots on her eyes.  She was directed to stop the levofloxacin.    Today, 12/20/2018: Had an I&D of a small abscess from left breast this morning with Dr. Veronica Zuniga.  Patient reports that cultures were taken.  She did stop the levofloxacin on 12/15 as directed, she notes that the tingling to her fingers and the spots on her eyes both resolved when she came off the medication.  Since being off she denies having any issues, she denies any nausea, vomiting, fevers, chills, diarrhea, headache, chest pain or shortness of breath.  States that she was seen by Dr. Louie who did an ultrasound of the breast and a small abscess was seen, which is the reason for the I&D this morning.  She stated that the draining site she had at our previous appointment continued to drain a little bit.  She also notes that she has had a couple of drops here and there of milk from both breasts.  She notes she has mild tenderness, but no excruciating pain.    ROS  As documented above in my HPI.    Past Medical  "History:   Diagnosis Date   • Blood clotting disorder (HCC)     hx. left arm post hospitalization   • Snoring     no cpap       Social History   Substance Use Topics   • Smoking status: Never Smoker   • Smokeless tobacco: Never Used   • Alcohol use No       Allergies: Amoxicillin; Clindamycin; Penicillins; Septra [sulfamethoxazole w-trimethoprim]; Sulfa drugs; Amoxicillin; Pcn [penicillins]; and Septra [sulfamethoxazole w-trimethoprim]    Pt's medication and problem list reviewed.     Objective:     PE:  /70   Pulse 73   Temp 36.6 °C (97.9 °F) (Temporal)   Ht 1.6 m (5' 3\")   Wt 61.7 kg (136 lb)   LMP 11/22/2018 (Exact Date)   SpO2 97%   Breastfeeding? No   BMI 24.09 kg/m²     Vital signs reviewed    Constitutional: Appears well-developed and well-nourished. No acute distress.  Speech fluent. Thin.    Eyes: Conjunctivae normal and EOM are normal. Pupils are equal, round, and reactive to light.   Neck: Trachea midline. Normal range of motion. Neck supple.   Cardiovascular: Normal rate, regular rhythm, normal heart sounds. No murmur. No edema.  Respiratory: No respiratory distress, unlabored respiratory effort.  Lungs clear to auscultation bilaterally. No wheezes.   Abdomen: Soft, non tender, non-distended. BS + x 4. No masses.   Musculoskeletal: Steady gait.  Normal range of motion.  Skin: Warm and dry. Good turgor.  Left upper breast-sutures in place with drain, moderate serosanguineous drainage without odor to dressing, decreased induration to axilla and mid breast, under breast remains soft, unable to assess induration at surgical site, mild erythema at surgical site.  Neurological: No cranial nerve deficit. Coordination normal.    Psychiatric: Alert and oriented to person, place, and time. Normal mood, calm affect.       Microbiology from 12/11/18:  Breast culture, no growth.    Assessment and Plan:   The following treatment plan was discussed with patient at length:    1. Acute mastitis of left " breast      Chest underwent I&D, path and cultures were obtained.  Will wait on those results.  Continue to hold on antibiotics at this time.  Monitor for s/sx of worsening off abx: increased redness, pain, swelling, drainage, breakdown of surgical site, fevers, chills, general malaise, etc.  Notify ID or go to ER should these s/sx occur.     Follow up: 2 weeks, RTC sooner if needed. FU with PCP for ongoing chronic medical conditions.     BONITA Mobley.R.N.       Please note that this dictation was created using voice recognition software. I have  worked with technical experts from [facility] to optimize the interface.  I have made every reasonable attempt to correct obvious errors, but there may be errors of grammar and possibly content that I did not discover before finalizing the note.

## 2018-12-22 ENCOUNTER — HOSPITAL ENCOUNTER (EMERGENCY)
Facility: MEDICAL CENTER | Age: 43
End: 2018-12-22
Attending: EMERGENCY MEDICINE
Payer: MEDICAID

## 2018-12-22 VITALS
RESPIRATION RATE: 16 BRPM | HEART RATE: 72 BPM | HEIGHT: 63 IN | TEMPERATURE: 98 F | SYSTOLIC BLOOD PRESSURE: 116 MMHG | OXYGEN SATURATION: 97 % | DIASTOLIC BLOOD PRESSURE: 81 MMHG | BODY MASS INDEX: 24.61 KG/M2 | WEIGHT: 138.89 LBS

## 2018-12-22 DIAGNOSIS — Z51.89 ENCOUNTER FOR WOUND RE-CHECK: ICD-10-CM

## 2018-12-22 PROCEDURE — 99283 EMERGENCY DEPT VISIT LOW MDM: CPT

## 2018-12-22 RX ORDER — IBUPROFEN 200 MG
800 TABLET ORAL EVERY 6 HOURS PRN
COMMUNITY

## 2018-12-22 ASSESSMENT — PAIN SCALES - GENERAL
PAINLEVEL_OUTOF10: 1
PAINLEVEL_OUTOF10: 1

## 2018-12-22 NOTE — ED NOTES
Pt assessed. C/o dehiscence of left breast incision that started this am. Pt has drain in place. No s/s infection. Will cont to monitor

## 2018-12-22 NOTE — ED NOTES
Med rec completed per pt  Allergies reviewed    Pt completed a course of Levaquin a week ago    Pt completed a course of Doxycycline about 2 weeks ago

## 2018-12-22 NOTE — ED PROVIDER NOTES
ED Provider Note    CHIEF COMPLAINT  Chief Complaint   Patient presents with   • Wound Re-Check       HPI  Sofia Viramontes is a 43 y.o. female who presents wound coming apart.  The patient's had which she says is a left breast cellulitis for several months.  She has been on multiple antibiotics for this.  More recently she had an incision and drainage by Dr. Veronica Zuniga and a drain has been placed.  She says sometime during the night 1 of the stitches popped loose.  She offers no other complaints no fevers no chills no change in the drainage whatsoever no increased swelling.    REVIEW OF SYSTEMS  CONSTITUTIONAL:  Denies fever, chills,   CARDIOVASCULAR:  Denies chest pain,   RESPIRATORY:  Denies cough or shortness of breath or difficulty breathing  MUSCULOSKELETAL:  Denies weakness joint swelling or back pain  SKIN: Wound is coming apart in her left breast  ALLERGIC: No itchy rashes.  NEUROLOGIC:  Denies headache      PAST MEDICAL HISTORY  Past Medical History:   Diagnosis Date   • Blood clotting disorder (HCC)     hx. left arm post hospitalization   • Snoring     no cpap       FAMILY HISTORY  Family History   Problem Relation Age of Onset   • Hypertension Mother    • Arthritis Father        SOCIAL HISTORY   reports that she has never smoked. She has never used smokeless tobacco. She reports that she does not drink alcohol or use drugs.    SURGICAL HISTORY  Past Surgical History:   Procedure Laterality Date   • SKIN ABSCESS INCISION AND DRAINAGE Left 2018    Procedure: SKIN ABSCESS INCISION AND DRAINAGE- BREAST;  Surgeon: Veronica Zuniga M.D.;  Location: SURGERY SAME DAY [facility];  Service: General   • GYN SURGERY         • PRIMARY C SECTION         CURRENT MEDICATIONS  Home Medications     Reviewed by Fran Adams (Pharmacy Tech) on 18 at 1158  Med List Status: Complete   Medication Last Dose Status   acetaminophen (TYLENOL) 500 MG Tab 2018 Active   ascorbic acid  "(ASCORBIC ACID) 500 MG Tab 12/21/2018 Active   ibuprofen (MOTRIN) 200 MG Tab 12/22/2018 Active   multivitamin (THERAGRAN) Tab 12/21/2018 Active   VITAMIN D, CHOLECALCIFEROL, PO 12/21/2018 Active                ALLERGIES  Allergies   Allergen Reactions   • Amoxicillin Hives     RXN=early 20's   • Clindamycin Itching     RXN=10/24/18   • Penicillins Hives     RXN=early 20's   • Septra [Sulfamethoxazole W-Trimethoprim] Hives     RXN=early 20's   • Sulfa Drugs Hives     RXN=early 20's   • Amoxicillin      hives   • Clindamycin      itchy   • Food      [place]ang[place]   • Lactose    • Pcn [Penicillins]      hives   • Penicillins    • Septra [Sulfamethoxazole W-Trimethoprim]      hives   • Sulfa Drugs        PHYSICAL EXAM  VITAL SIGNS: /87   Pulse 81   Temp 36.3 °C (97.4 °F) (Temporal)   Resp 16   Ht 1.6 m (5' 3\")   Wt 63 kg (138 lb 14.2 oz)   LMP 11/22/2018 (Exact Date)   SpO2 100%   BMI 24.60 kg/m²      Constitutional: Patient is awake alert person place and time. No acute respiratory distress Well developed, Well nourished, Non-toxic appearance.  Sitting up.  HENT: Normocephalic, Atraumatic,  Cardiovascular: Heart is regular rate and rhythm no murmur, rub or thrill.   Thorax & Lungs: Chest is symmetrical, with good breath sounds. No wheezing or crackles. No respiratory distress, No chest tenderness.   Abdomen:  Soft, No tenderness   Skin: Left breast.  She has a rubber drain that is been looped through 2 separate incision sites in the left breast.  Does not appear to be pus or infection.  It looks as though 1 of the stitches is come loose and the superior incision site is wide or than it was.  There is no obvious bleeding.  The breast itself is really nontender to her with palpation.  This was done with a female nurse chaperone  Extremities: No  edema.  Non tender.   Musculoskeletal: Good range of motion upper lower extremities.  Patient was initially sitting on the bed and then moved over to the bed without " difficulties  Neurologic: Alert & oriented   Strength is symmetrical in the upper lower extremities   psychiatric: Cooperative and friendly      COURSE & MEDICAL DECISION MAKING  Pertinent Labs & Imaging studies reviewed. (See chart for details)  Patient had recent incision and drainage of the left breast abscess with a drain placed.  She has had wound dehiscence with a stitch came loose.  The wound is open more.  However there is no obvious signs of pus or drainage no redness she does not appear to be toxic.  I do not think that the infection has progressed.  Believe that she could have close follow-up as an outpatient with her surgeons.  I discussed the case with the surgeon on-call at this point the patient will be followed up as an outpatient with her primary surgeon this coming week.  She understands if there is positive fevers redness or any the changes she is come back to emergency room immediately.  Of note is that she is a single mother and has a 2-year-old child that she does have to lift quite often with certainly could have caused the wound to the has in the stitch to come out.    Stable for discharge      FINAL IMPRESSION  1.  Wound recheck with wound dehiscence       PLAN  1.  Follow-up with her surgeon this week  2.  Continue her current wound care.  3.  Reduce her lifting and use of her left arm.  4. Return to the emergency department for increased pains, fevers, vomiting or change in condition.  Electronically signed by: Nabor Arenas, 12/22/2018 12:03 PM

## 2018-12-22 NOTE — ED NOTES
SKIN ABSCESS INCISION AND DRAINAGE- BREAST    Pt had a left breast abscess with incision and drainage performed the 20 th of this month at [facility].  She presents today for wound recheck. She C/O wound dehiscence since last night.

## 2018-12-22 NOTE — ED NOTES
Discharge information provided. Pt verbalized understanding of discharge instructions to follow up with PCP and to return to ER if condition worsens. Pt ambulated out of ER in a steady gait, no additional questions or concerns. No new medication. Educated on wound care

## 2018-12-24 ENCOUNTER — PATIENT MESSAGE (OUTPATIENT)
Dept: INTERNAL MEDICINE | Facility: MEDICAL CENTER | Age: 43
End: 2018-12-24

## 2018-12-24 ENCOUNTER — TELEPHONE (OUTPATIENT)
Dept: INFECTIOUS DISEASES | Facility: MEDICAL CENTER | Age: 43
End: 2018-12-24

## 2018-12-24 NOTE — TELEPHONE ENCOUNTER
Preeti REID notified about the surgical culture results. Called and LM for pt with given results and information. Pt is to call back if she has any further questions or concerns. KAIT

## 2018-12-24 NOTE — TELEPHONE ENCOUNTER
"From: Sofia Viramontes  To: Juan Vizcarra M.D.  Sent: 12/24/2018 9:42 AM PST  Subject: Non-Urgent Medical Question    Hi Dr. Vizcarra,    I had surgery with Dr. Veronica Zuniga on Thursday to drain and clean the mastitis. I have a couple of drains in, but it seems like the drains are pulling out. There's nothing coming out of the drains (there is some puss and blood coming from the incision itself but not out of the drains). I'm a bit worried because the incisions keep getting bigger and bigger. (One is a circlular hole about 1/2-3/4\" in diameter right now.)     I'm not having any chills or fever. I went to the ER on Saturday and they just told.me to get in to [facility] as soon as possible, but they're closed today. I just wanted to check with you to see if you think I'm okay to wait until Wednesday to try to get in to see them?  "

## 2018-12-25 LAB
BACTERIA SPEC ANAEROBE CULT: NORMAL
SIGNIFICANT IND 70042: NORMAL
SITE SITE: NORMAL
SOURCE SOURCE: NORMAL

## 2019-03-15 ENCOUNTER — OFFICE VISIT (OUTPATIENT)
Dept: URGENT CARE | Facility: CLINIC | Age: 44
End: 2019-03-15
Payer: MEDICAID

## 2019-03-15 ENCOUNTER — APPOINTMENT (OUTPATIENT)
Dept: RADIOLOGY | Facility: IMAGING CENTER | Age: 44
End: 2019-03-15
Attending: NURSE PRACTITIONER
Payer: MEDICAID

## 2019-03-15 VITALS
SYSTOLIC BLOOD PRESSURE: 117 MMHG | DIASTOLIC BLOOD PRESSURE: 72 MMHG | RESPIRATION RATE: 14 BRPM | WEIGHT: 138 LBS | TEMPERATURE: 98 F | BODY MASS INDEX: 24.45 KG/M2 | OXYGEN SATURATION: 98 % | HEIGHT: 63 IN | HEART RATE: 82 BPM

## 2019-03-15 DIAGNOSIS — S61.213A LACERATION OF LEFT MIDDLE FINGER WITHOUT FOREIGN BODY WITHOUT DAMAGE TO NAIL, INITIAL ENCOUNTER: ICD-10-CM

## 2019-03-15 PROCEDURE — 73140 X-RAY EXAM OF FINGER(S): CPT | Mod: TC,LT | Performed by: NURSE PRACTITIONER

## 2019-03-15 PROCEDURE — 12001 RPR S/N/AX/GEN/TRNK 2.5CM/<: CPT | Performed by: NURSE PRACTITIONER

## 2019-03-15 RX ORDER — MULTIVIT WITH MINERALS/LUTEIN
1 TABLET ORAL DAILY
COMMUNITY
End: 2022-05-13

## 2019-03-16 NOTE — PROGRESS NOTES
Chief Complaint   Patient presents with   • Other     (L) middle finger half an inch cut        HISTORY OF PRESENT ILLNESS: Patient is a 43 y.o. female who presents to urgent care today with complains of a laceration.  Patient reports that about 1 hour prior to arrival she accidentally sliced her finger with a kitchen knife.  She washed the wound out, placed a bandage, and came straight to urgent care for evaluation and care.  Her tetanus is up-to-date.  She is right-hand dominant.  Her pain is minimal.  Denies other areas of injury or trauma.    Patient Active Problem List    Diagnosis Date Noted   • Acute mastitis of left breast 11/02/2018     Priority: High   • Normocytic anemia 11/02/2018     Priority: Low   • Other headache syndrome 12/12/2018   • Left arm swelling 11/04/2018   • Thrombocytosis (HCC) 11/02/2018   • Breast pain, left 10/29/2018       Allergies:Amoxicillin; Clindamycin; Penicillins; Septra [sulfamethoxazole w-trimethoprim]; Sulfa drugs; Amoxicillin; Clindamycin; Food; Lactose; Pcn [penicillins]; Penicillins; Septra [sulfamethoxazole w-trimethoprim]; and Sulfa drugs    Current Outpatient Prescriptions Ordered in [facility]   Medication Sig Dispense Refill   • vitamin E (VITAMIN E) 1000 UNIT Cap Take 1 Cap by mouth every day.     • VITAMIN D, CHOLECALCIFEROL, PO Take 500 mg by mouth every day at 6 PM.     • ascorbic acid (ASCORBIC ACID) 500 MG Tab Take 500 mg by mouth every day.     • multivitamin (THERAGRAN) Tab Take 1 Tab by mouth 1 time daily as needed.     • ibuprofen (MOTRIN) 200 MG Tab Take 800 mg by mouth every 6 hours as needed for Mild Pain.     • acetaminophen (TYLENOL) 500 MG Tab Take 1,000 mg by mouth 2 times a day as needed.       No current Epic-ordered facility-administered medications on file.        Past Medical History:   Diagnosis Date   • Blood clotting disorder (HCC)     hx. left arm post hospitalization   • Snoring     no cpap       Social History   Substance Use Topics   • Smoking  "status: Never Smoker   • Smokeless tobacco: Never Used   • Alcohol use No       Family Status   Relation Status   • Mo Alive   • Fa Alive     Family History   Problem Relation Age of Onset   • Hypertension Mother    • Arthritis Father        ROS:  Review of Systems   Constitutional: Negative for fever, chills, weight loss, malaise, and fatigue.   HENT: Negative for ear pain, nosebleeds, congestion, sore throat and neck pain.    Eyes: Negative for vision changes.   Neuro: Negative for headache, sensory changes, weakness, seizure, LOC.   Cardiovascular: Negative for chest pain, palpitations, orthopnea and leg swelling.   Respiratory: Negative for cough, sputum production, shortness of breath and wheezing.   Gastrointestinal: Negative for abdominal pain, nausea, vomiting or diarrhea.   Musculoskeletal: Negative for falls, neck pain, back pain, joint pain, myalgias.   Skin: Positive for laceration.  Negative for rash, diaphoresis.     Exam:  Blood pressure 117/72, pulse 82, temperature 36.7 °C (98 °F), resp. rate 14, height 1.6 m (5' 3\"), weight 62.6 kg (138 lb), SpO2 98 %, not currently breastfeeding.  General: well-nourished, well-developed female in NAD  Head: normocephalic, atraumatic  Eyes: PERRLA, no conjunctival injection, acuity grossly intact, lids normal.  Ears: normal shape and symmetry, no tenderness, no discharge. External canals are without any significant edema or erythema. Tympanic membranes are without any inflammation, no effusion. Gross auditory acuity is intact.  Nose: symmetrical without tenderness, no discharge.  Mouth/Throat: reasonable hygiene, no erythema, exudates or tonsillar enlargement.  Neck: no masses, range of motion within normal limits, no tracheal deviation. No obvious thyroid enlargement.   Lymph: no cervical adenopathy. No supraclavicular adenopathy.   Neuro: alert and oriented. Cranial nerves 1-12 grossly intact. No sensory deficit.   Cardiovascular: regular rate and rhythm. No " "edema.  Pulmonary: no distress. Chest is symmetrical with respiration, no wheezes, crackles, or rhonchi.   Musculoskeletal: no clubbing, appropriate muscle tone, gait is stable.  There is a 1 cm partial-thickness laceration to left middle distal phalanx.  Edges well approximated, bleeding is controlled, no nailbed involvement.  No foreign bodies.  Distal neurovascular is intact.  Finger has full range of motion, strength 5/5.  Skin: warm, no clubbing, no cyanosis, no rashes.  Laceration present.  Psych: appropriate mood, affect, judgement.         Assessment/Plan:  1. Laceration of left middle finger without foreign body without damage to nail, initial encounter  DX-FINGER(S) 2+ LEFT       DX finger reviewed by myself, radiology reading \"Possible nondisplaced fracture involving the phalangeal tuft as described above.\"      X-ray notes possible nondisplaced fracture over tuft.  The patient is asymptomatic at this area and do not suspect a fracture.  Risk and benefits of laceration repair discussed with patient, including bleeding, infection, scarring and reaction of Dermabond. Consent was obtained for repair of laceration. Under clean conditions I applied 3 thin layers of skin adhesive with dry time in between with good wound edge approximation. Patient tolerated procedure well. Dressing was applied and wound care/follow up instructions were given. Patients questions were answered.      .        Please note that this dictation was created using voice recognition software. I have made every reasonable attempt to correct obvious errors, but I expect that there are errors of grammar and possibly content that I did not discover before finalizing the note.      HILLARY Wheeler.     "

## 2019-07-15 ENCOUNTER — OFFICE VISIT (OUTPATIENT)
Dept: URGENT CARE | Facility: CLINIC | Age: 44
End: 2019-07-15
Payer: MEDICAID

## 2019-07-15 VITALS
SYSTOLIC BLOOD PRESSURE: 124 MMHG | RESPIRATION RATE: 16 BRPM | DIASTOLIC BLOOD PRESSURE: 68 MMHG | HEART RATE: 82 BPM | TEMPERATURE: 98 F | WEIGHT: 137 LBS | OXYGEN SATURATION: 96 % | BODY MASS INDEX: 24.27 KG/M2

## 2019-07-15 DIAGNOSIS — M25.512 ACUTE PAIN OF LEFT SHOULDER: ICD-10-CM

## 2019-07-15 DIAGNOSIS — M54.12 CERVICAL RADICULOPATHY: ICD-10-CM

## 2019-07-15 PROCEDURE — 99214 OFFICE O/P EST MOD 30 MIN: CPT | Performed by: PHYSICIAN ASSISTANT

## 2019-07-15 ASSESSMENT — ENCOUNTER SYMPTOMS
NAUSEA: 0
ABDOMINAL PAIN: 0
VISUAL CHANGE: 0
DIARRHEA: 0
FALLS: 0
CHILLS: 0
RESPIRATORY NEGATIVE: 1
VOMITING: 0
FEVER: 0
NECK PAIN: 0
BACK PAIN: 0
WEAKNESS: 0
BLURRED VISION: 0
CARDIOVASCULAR NEGATIVE: 1
NUMBNESS: 1
HEADACHES: 0
DIZZINESS: 0

## 2019-07-15 NOTE — PROGRESS NOTES
Subjective:      Sofia Viramontes is a 43 y.o. female who presents with Shoulder Pain ((L) shoulder pain and numbness x 1.5 weeks)            Atraumatic left shoulder pain.  She states the pain is mainly in the left scapula anterior shoulder.  It is reproducible.  Does radiate down the left arm and cause some numbness in her second through fourth digit of her left hand.  No previous problems with her shoulder.  Denies neck pain.  Has been to her chiropractor and an occupational therapist.  Requesting referral to PT.  Denies chest pain, palpitations, shortness of breath, nausea, dizziness, blurry vision.  No personal or family history of early cardiac or pulmonary etiology per      Shoulder Pain   This is a new problem. The current episode started 1 to 4 weeks ago. The problem occurs constantly. The problem has been unchanged. Associated symptoms include numbness. Pertinent negatives include no abdominal pain, chills, fever, headaches, nausea, neck pain, visual change, vomiting or weakness. The symptoms are aggravated by bending (Lifting). She has tried NSAIDs and acetaminophen (Chiropractic, OT) for the symptoms. The treatment provided mild relief.       PMH:  has a past medical history of Blood clotting disorder (HCC) and Snoring.  MEDS:   Current Outpatient Prescriptions:   •  ibuprofen (MOTRIN) 200 MG Tab, Take 800 mg by mouth every 6 hours as needed for Mild Pain., Disp: , Rfl:   •  acetaminophen (TYLENOL) 500 MG Tab, Take 1,000 mg by mouth 2 times a day as needed., Disp: , Rfl:   •  vitamin E (VITAMIN E) 1000 UNIT Cap, Take 1 Cap by mouth every day., Disp: , Rfl:   •  VITAMIN D, CHOLECALCIFEROL, PO, Take 500 mg by mouth every day at 6 PM., Disp: , Rfl:   •  ascorbic acid (ASCORBIC ACID) 500 MG Tab, Take 500 mg by mouth every day., Disp: , Rfl:   •  multivitamin (THERAGRAN) Tab, Take 1 Tab by mouth 1 time daily as needed., Disp: , Rfl:   ALLERGIES:   Allergies   Allergen Reactions   • Amoxicillin Hives      RXN=early 20's   • Clindamycin Itching     RXN=10/24/18   • Penicillins Hives     RXN=early 20's   • Septra [Sulfamethoxazole W-Trimethoprim] Hives     RXN=early 20's   • Sulfa Drugs Hives     RXN=early 20's   • Amoxicillin      hives   • Clindamycin      itchy   • Food      Mangoes   • Lactose    • Pcn [Penicillins]      hives   • Penicillins    • Septra [Sulfamethoxazole W-Trimethoprim]      hives   • Sulfa Drugs      SURGHX:   Past Surgical History:   Procedure Laterality Date   • SKIN ABSCESS INCISION AND DRAINAGE Left 2018    Procedure: SKIN ABSCESS INCISION AND DRAINAGE- BREAST;  Surgeon: Veronica Zuniga M.D.;  Location: SURGERY SAME DAY [facility];  Service: General   • GYN SURGERY         • PRIMARY C SECTION       SOCHX:  reports that she has never smoked. She has never used smokeless tobacco. She reports that she does not drink alcohol or use drugs.  FH: family history includes Arthritis in her father; Hypertension in her mother.      Review of Systems   Constitutional: Negative for chills and fever.   Eyes: Negative for blurred vision.   Respiratory: Negative.    Cardiovascular: Negative.    Gastrointestinal: Negative for abdominal pain, diarrhea, nausea and vomiting.   Musculoskeletal: Positive for joint pain. Negative for back pain, falls and neck pain.   Neurological: Positive for numbness. Negative for dizziness, weakness and headaches.       Medications, Allergies, and current problem list reviewed today in Epic     Objective:     /68 (BP Location: Left arm, Patient Position: Sitting, BP Cuff Size: Adult)   Pulse 82   Temp 36.7 °C (98 °F) (Temporal)   Resp 16   Wt 62.1 kg (137 lb)   SpO2 96%   BMI 24.27 kg/m²      Physical Exam   Constitutional: She is oriented to person, place, and time. She appears well-developed and well-nourished. No distress.   HENT:   Head: Normocephalic and atraumatic.   Right Ear: External ear normal.   Left Ear: External ear normal.   Eyes:  Pupils are equal, round, and reactive to light. Conjunctivae and EOM are normal.   Neck: Normal range of motion. Neck supple.   Cardiovascular: Normal rate, regular rhythm and normal heart sounds.    Pulmonary/Chest: Effort normal and breath sounds normal. No respiratory distress. She has no wheezes.   Musculoskeletal:        Right shoulder: She exhibits normal strength.        Left shoulder: She exhibits tenderness and pain. She exhibits normal range of motion, no bony tenderness, no swelling, no effusion, no crepitus, no deformity, no spasm, normal pulse and normal strength.        Left elbow: Normal.        Left wrist: Normal.        Cervical back: Normal.        Arms:  Neurological: She is alert and oriented to person, place, and time.   Skin: Skin is warm and dry. She is not diaphoretic.   Psychiatric: She has a normal mood and affect. Her behavior is normal. Judgment and thought content normal.   Nursing note and vitals reviewed.              Assessment/Plan:     1. Acute pain of left shoulder  REFERRAL TO PHYSICAL THERAPY Reason for Therapy: Eval/Treat/Report   2. Cervical radiculopathy  REFERRAL TO PHYSICAL THERAPY Reason for Therapy: Eval/Treat/Report     Vitals normal, no red flag symptoms.  Reproducible left shoulder pain with radiculopathy.  OTC meds and conservative measures as discussed    Return to clinic or go to ED if symptoms worsen or persist. Indications for ED discussed at length. Patient voices understanding. Follow-up with your primary care provider in 3-5 days. Red flags discussed. All side effects of medication discussed including allergic response, GI upset, tendon injury, etc.    Please note that this dictation was created using voice recognition software. I have made every reasonable attempt to correct obvious errors, but I expect that there are errors of grammar and possibly content that I did not discover before finalizing the note.

## 2019-12-19 ENCOUNTER — HOSPITAL ENCOUNTER (OUTPATIENT)
Dept: RADIOLOGY | Facility: MEDICAL CENTER | Age: 44
End: 2019-12-19
Attending: OBSTETRICS & GYNECOLOGY
Payer: MEDICAID

## 2019-12-19 DIAGNOSIS — Z12.31 SCREENING MAMMOGRAM FOR HIGH-RISK PATIENT: ICD-10-CM

## 2019-12-19 PROCEDURE — 77063 BREAST TOMOSYNTHESIS BI: CPT

## 2021-01-06 ENCOUNTER — HOSPITAL ENCOUNTER (OUTPATIENT)
Dept: RADIOLOGY | Facility: MEDICAL CENTER | Age: 46
End: 2021-01-06
Attending: OBSTETRICS & GYNECOLOGY
Payer: MEDICAID

## 2021-01-06 DIAGNOSIS — Z12.31 ENCOUNTER FOR MAMMOGRAM TO ESTABLISH BASELINE MAMMOGRAM: ICD-10-CM

## 2021-01-06 PROCEDURE — 77063 BREAST TOMOSYNTHESIS BI: CPT

## 2022-05-13 ENCOUNTER — OFFICE VISIT (OUTPATIENT)
Dept: INTERNAL MEDICINE | Facility: OTHER | Age: 47
End: 2022-05-13
Payer: MEDICAID

## 2022-05-13 VITALS
BODY MASS INDEX: 23.76 KG/M2 | DIASTOLIC BLOOD PRESSURE: 63 MMHG | WEIGHT: 139.2 LBS | HEIGHT: 64 IN | SYSTOLIC BLOOD PRESSURE: 93 MMHG | HEART RATE: 76 BPM | OXYGEN SATURATION: 98 % | TEMPERATURE: 97.3 F

## 2022-05-13 DIAGNOSIS — D75.839 THROMBOCYTOSIS: ICD-10-CM

## 2022-05-13 DIAGNOSIS — Z11.59 ENCOUNTER FOR HEPATITIS C SCREENING TEST FOR LOW RISK PATIENT: ICD-10-CM

## 2022-05-13 DIAGNOSIS — Z13.6 ENCOUNTER FOR LIPID SCREENING FOR CARDIOVASCULAR DISEASE: ICD-10-CM

## 2022-05-13 DIAGNOSIS — Z11.4 ENCOUNTER FOR HIV (HUMAN IMMUNODEFICIENCY VIRUS) TEST: ICD-10-CM

## 2022-05-13 DIAGNOSIS — Z76.89 ENCOUNTER TO ESTABLISH CARE: ICD-10-CM

## 2022-05-13 DIAGNOSIS — Z20.2 ENCOUNTER FOR ASSESSMENT OF STD EXPOSURE: ICD-10-CM

## 2022-05-13 DIAGNOSIS — D64.9 NORMOCYTIC ANEMIA: ICD-10-CM

## 2022-05-13 DIAGNOSIS — Z13.220 ENCOUNTER FOR LIPID SCREENING FOR CARDIOVASCULAR DISEASE: ICD-10-CM

## 2022-05-13 DIAGNOSIS — R30.0 DYSURIA: ICD-10-CM

## 2022-05-13 PROBLEM — M79.89 LEFT ARM SWELLING: Status: RESOLVED | Noted: 2018-11-04 | Resolved: 2022-05-13

## 2022-05-13 PROBLEM — N64.4 BREAST PAIN, LEFT: Status: RESOLVED | Noted: 2018-10-29 | Resolved: 2022-05-13

## 2022-05-13 PROBLEM — N61.0 ACUTE MASTITIS OF LEFT BREAST: Status: RESOLVED | Noted: 2018-11-02 | Resolved: 2022-05-13

## 2022-05-13 LAB
APPEARANCE UR: CLEAR
BILIRUB UR STRIP-MCNC: NEGATIVE MG/DL
COLOR UR AUTO: YELLOW
GLUCOSE UR STRIP.AUTO-MCNC: NEGATIVE MG/DL
KETONES UR STRIP.AUTO-MCNC: NEGATIVE MG/DL
LEUKOCYTE ESTERASE UR QL STRIP.AUTO: NEGATIVE
NITRITE UR QL STRIP.AUTO: NEGATIVE
PH UR STRIP.AUTO: 7 [PH] (ref 5–8)
PROT UR QL STRIP: NEGATIVE MG/DL
RBC UR QL AUTO: NEGATIVE
SP GR UR STRIP.AUTO: 1.01
UROBILINOGEN UR STRIP-MCNC: 0.2 MG/DL

## 2022-05-13 PROCEDURE — 81002 URINALYSIS NONAUTO W/O SCOPE: CPT | Performed by: INTERNAL MEDICINE

## 2022-05-13 PROCEDURE — 99204 OFFICE O/P NEW MOD 45 MIN: CPT | Performed by: INTERNAL MEDICINE

## 2022-05-13 RX ORDER — LEVONORGESTREL AND ETHINYL ESTRADIOL 0.15-0.03
1 KIT ORAL DAILY
COMMUNITY
End: 2023-06-26

## 2022-05-13 ASSESSMENT — PATIENT HEALTH QUESTIONNAIRE - PHQ9: CLINICAL INTERPRETATION OF PHQ2 SCORE: 0

## 2022-05-13 NOTE — PROGRESS NOTES
"Subjective     Sofia Viramontes is a 46 y.o. 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  She had several topics to potentially address, including possible yeast infection, hip pain, dry skin on her hands and feet, sleep issues, and wanting to do annual preventative exam.  Through shared decision-making we decided to do the full history intake along with addressing possible yeast infection/urinary symptoms today.    Patient reports that she began oral contraceptives per her OB/GYN last week, and a few days later developed some vaginal itching, burning with urination, and perhaps some mild white discharge.  She also notes some prior mild intermittent flank pain on both sides but is completely asymptomatic at present.  Denies fevers, chills, night sweats.    Review of systems: Denies fevers, chills, night sweats, vomiting, diarrhea, unexpected weight loss, lightheadedness, eating and drinking normally, no chest pain or shortness of breath.  Feels well overall.    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 drugs, self employed   Allergies: pcn - hives, sulfa - hives, clindamycin - hives  Meds: OCP, vit D spray, cranberry pills, azo            ROS               Objective     BP (!) 93/63 (BP Location: Left arm, Patient Position: Sitting, BP Cuff Size: Adult)   Pulse 76   Temp 36.3 °C (97.3 °F) (Temporal)   Ht 1.626 m (5' 4\")   Wt 63.1 kg " (139 lb 3.2 oz)   SpO2 98%   BMI 23.89 kg/m²      Physical Exam    General: Pleasant, upbeat woman in no apparent distress, alert, responding appropriately  HEENT: Pupils equal round reactive to light and accommodation, grossly clear oropharynx, no overt thyromegaly although perhaps mild prominence of the left side compared to the right.  Cardiovascular: Regular rate and rhythm no murmurs rubs or gallops  Pulmonary: Clear to auscultation bilaterally  Abdomen: Soft, nontender.  No CVA tenderness bilaterally  Extremities: No lower extreme edema bilaterally        Point-of-care urine dip negative for infection.             Assessment & Plan      likely yeast infection  -Given discharge, itching, negative point-of-care urine dip, and improvement with over-the-counter Monistat, her symptoms likely represent a yeast infection.  The burning with urination likely represents irritated skin that is being triggered/irritated when urine passes over it.  Offered to perform pelvic exam for completion given vaginal complaints, patient politely declined after shared decision making which is reasonable given otherwise benign exam, negative UA, and mild symptoms that are improving with treatment.  Patient is sexually active but was recently tested for chlamydia and gonorrhea which were negative and reports she is in a monogamous relationship with 1 partner, given lack of purulent discharge and again improvement with over-the-counter treatment will monitor closely.  Counseled any new or worsening symptoms including significant discharge, fevers, flank pain to call immediately or go to the emergency room.  Also counseled on preventative measures to prevent UTIs (although this is unlikely UTI) in the future such as remaining hydrated, urinating immediately after intercourse, general hygiene recommendations also reviewed.         Encounter to establish care  -History reviewed, standard infectious screening labs ordered as discussed  with patient.  Patient reports possible elevated lipids in the past, will repeat lipids today.    History of anemia, history of thrombocytosis  -Had a normocytic anemia and thrombocytosis many years ago on the labs that appears was not repeated, perhaps around the time of her mastitis biopsy.  We will repeat CBC to trend.  Asymptomatic today.    mangoe allergy - mild, reports is able to tolerate small amounts without any symptoms at all, never had anaphylaxis.  Discussed possibility of carrying epipen, patient politely declines at present given mild nature of symptoms.  States has not had more significant symptoms for 20 years.  Will plan to evaluate more fully at future visit, cautioned any sob/throat swelling  or severe lightheadendes after eating to go to ED for evaluation.    Discussion on vitamins/supplements  -Patient reports taking cranberry pills, Azo supplements for urinary symptoms/prevention, and also questions the utility of a multivitamin.  Discussed that if she is eating a balanced diet there is no need for a multivitamin per current literature.  Discussed that Azo pills simply mask symptoms of urinary tract infection and do not treat or prevent them, and encouraged her to discontinue use.  Discussed that my understanding is that cranberry juice has not been shown to have a significant intervention in terms of reducing UTIs, and that hydration itself is more effective, although I will need to review this to see if any new or literature exists.    Return to clinic within 1 to 2 weeks to follow-up on hip pain,  Dry skin on hands and feet,  Consider revisit food allergy.  Future visit ? Mild L thyroid prominence - repeat thyroid exam, sleep issues, review ? Arm swelling in chart and prior mild headache (lasted a few seconds then resolved), then annual exam.

## 2022-05-13 NOTE — PATIENT INSTRUCTIONS
Continue monistat per instructions  Any new or worsening symptoms such as fevers, chill, night sweats, new significant back pain, or vaginal discharge -call or to ED immediately  Stay hydrated.

## 2022-05-18 DIAGNOSIS — Z20.2 ENCOUNTER FOR ASSESSMENT OF STD EXPOSURE: ICD-10-CM

## 2022-05-18 DIAGNOSIS — Z13.220 ENCOUNTER FOR LIPID SCREENING FOR CARDIOVASCULAR DISEASE: ICD-10-CM

## 2022-05-18 DIAGNOSIS — D75.839 THROMBOCYTOSIS: ICD-10-CM

## 2022-05-18 DIAGNOSIS — D64.9 NORMOCYTIC ANEMIA: ICD-10-CM

## 2022-05-18 DIAGNOSIS — Z13.6 ENCOUNTER FOR LIPID SCREENING FOR CARDIOVASCULAR DISEASE: ICD-10-CM

## 2022-05-19 ENCOUNTER — OFFICE VISIT (OUTPATIENT)
Dept: INTERNAL MEDICINE | Facility: OTHER | Age: 47
End: 2022-05-19
Payer: MEDICAID

## 2022-05-19 VITALS
BODY MASS INDEX: 24.8 KG/M2 | TEMPERATURE: 98.5 F | HEIGHT: 63 IN | WEIGHT: 140 LBS | HEART RATE: 80 BPM | OXYGEN SATURATION: 98 % | SYSTOLIC BLOOD PRESSURE: 111 MMHG | DIASTOLIC BLOOD PRESSURE: 76 MMHG

## 2022-05-19 DIAGNOSIS — M25.551 HIP PAIN, ACUTE, RIGHT: ICD-10-CM

## 2022-05-19 DIAGNOSIS — B37.9 YEAST INFECTION: ICD-10-CM

## 2022-05-19 DIAGNOSIS — G57.01 PIRIFORMIS SYNDROME OF RIGHT SIDE: ICD-10-CM

## 2022-05-19 PROBLEM — Z83.42 FAMILY HISTORY OF HYPERCHOLESTEROLEMIA: Status: ACTIVE | Noted: 2019-09-03

## 2022-05-19 PROBLEM — E78.5 HYPERLIPIDEMIA: Status: ACTIVE | Noted: 2020-04-15

## 2022-05-19 PROBLEM — Z00.00 ENCOUNTER FOR GENERAL ADULT MEDICAL EXAMINATION WITHOUT ABNORMAL FINDINGS: Status: ACTIVE | Noted: 2020-04-15

## 2022-05-19 PROCEDURE — 99214 OFFICE O/P EST MOD 30 MIN: CPT | Mod: GC | Performed by: STUDENT IN AN ORGANIZED HEALTH CARE EDUCATION/TRAINING PROGRAM

## 2022-05-19 RX ORDER — MULTIVIT WITH MINERALS/LUTEIN
TABLET ORAL
COMMUNITY
End: 2022-06-22

## 2022-05-19 ASSESSMENT — ENCOUNTER SYMPTOMS
MYALGIAS: 1
PALPITATIONS: 0
ABDOMINAL PAIN: 0
CHILLS: 0
DIZZINESS: 0
DIARRHEA: 0
WEAKNESS: 0
VOMITING: 0
NAUSEA: 0
HEADACHES: 0
CONSTIPATION: 0
SHORTNESS OF BREATH: 0
FOCAL WEAKNESS: 0
FEVER: 0

## 2022-05-19 NOTE — PROGRESS NOTES
"      CC:   Chief Complaint   Patient presents with   • Follow-Up     Discuss PSOAS pain                                                                                                                                            HPI:   Sofia presents today with the following.    Hip pain 2 months, started as \"cracking in both hips\" now more focused in the R. Feels more muscular. Participates ultimate frisbee, yoga, been to chiro with stretches, with limted effect. Pain is slowly improved. Has not seen PT. No use of NSAIDs, massage did help temporarily. Does most daily activities, but sometimes shoe tying can be painful.      There are no diagnoses linked to this encounter.    Patient Active Problem List    Diagnosis Date Noted   • Piriformis syndrome of right side 05/19/2022   • Encounter for general adult medical examination without abnormal findings 04/15/2020   • Hyperlipidemia 04/15/2020   • Family history of hypercholesterolemia 09/03/2019   • Other headache syndrome 12/12/2018   • Normocytic anemia 11/02/2018   • Thrombocytosis 11/02/2018       Current Outpatient Medications   Medication Sig Dispense Refill   • vitamin E (VITAMIN E) 1000 Unit (450 mg) Cap VITAMIN E 1000 UNIT CAPS     • VITAMIN D, CHOLECALCIFEROL, PO VITAMIN D, CHOLECALCIFEROL, PO     • levonorgestrel-ethinyl estradiol (ALTAVERA) 0.15-30 MG-MCG per tablet Take 1 Tablet by mouth every day.     • ibuprofen (MOTRIN) 200 MG Tab Take 800 mg by mouth every 6 hours as needed for Mild Pain.     • VITAMIN D, CHOLECALCIFEROL, PO Take 500 mg by mouth every day at 6 PM.       No current facility-administered medications for this visit.         Allergies as of 05/19/2022 - Reviewed 05/13/2022   Allergen Reaction Noted   • Amoxicillin Hives 10/23/2018   • Clindamycin Itching 10/23/2018   • Penicillins Hives 10/23/2018   • Septra [sulfamethoxazole w-trimethoprim] Hives 10/23/2018   • Sulfa drugs Hives 10/23/2018   • Amoxicillin  10/24/2018   • Clindamycin  " "10/29/2018   • Food  12/19/2018   • Lactose  12/19/2018   • Pcn [penicillins]  10/24/2018   • Penicillins  10/29/2018   • Septra [sulfamethoxazole w-trimethoprim]  10/24/2018   • Sulfa drugs  10/29/2018          /76 (BP Location: Right arm, Patient Position: Sitting, BP Cuff Size: Adult)   Pulse 80   Temp 36.9 °C (98.5 °F) (Temporal)   Ht 1.6 m (5' 3\")   Wt 63.5 kg (140 lb)   SpO2 98%   BMI 24.80 kg/m²     Review of Systems   Constitutional: Negative for chills, fever and malaise/fatigue.   Respiratory: Negative for shortness of breath.    Cardiovascular: Negative for chest pain and palpitations.   Gastrointestinal: Negative for abdominal pain, constipation, diarrhea, nausea and vomiting.   Genitourinary: Negative for dysuria, frequency and urgency.   Musculoskeletal: Positive for joint pain and myalgias.   Neurological: Negative for dizziness, focal weakness, weakness and headaches.        Physical Exam:  Gen:  Alert and oriented, No apparent distress.  Neuro:  CN II-XII intact, no focal deficits  Lungs:  CTAB  CV:  RRR no m/g/r  Abd:  Soft, nontender, nondistended  Skin:  No acute rash/lesions  Ext:  Shoulder/elbow flexion/extension 5/5 bilaterally and symmetric; ambulates independently with steady gait; R hip internal rotation ilicits discomfort, point tenderness over mid glute, with positive ricardo testing.      Assessment and Plan.   Sofia Viramontes is a 46 y.o. female with the following issues:    Piriformis syndrome of right side  R hip pain for 2 months, worst with internal rotation, in both groin/SI joint. No significant changes in activity/trauma.  -PT referral  -stretches demonstrated during visit  -NSAIDs as needed  -Diclofenac gel 1% discussed  -continued exercise/activity as tolerated  -follow up 1 month         "

## 2022-05-19 NOTE — PROGRESS NOTES
Teaching Physician Attestation      Level of Participation    I have personally interviewed and examined the patient.  In addition, I discussed with the resident physician the patient's history, exam, assessment and plan in detail.  Topics listed in my addendum were the focus of the visit.  Healthcare maintenance was not addressed this visit unless listed as a topic in my addendum.  I agree with the plan as written along with the following additions/modifications:      Right-sided piriformis syndrome with likely concurrent right mild osteoarthritis/labral tear, overall tight musculature in the right hip  -Patient does keep her cell phone in her right back pocket, has pain directly in the middle of the right buttocks.  On exam done with chaperone Vianca SCHULTE in room, patient is tender to palpation in the middle of the right buttocks consistent with where the piriformis is located, she also has limited internal rotation of the right leg compared to the left consistent with a diagnosis piriformis syndrome.  She has no alarm signs or symptoms.  -Patient has a positive SARA test on the right (anterior hip pain), no pain with hip rocking, ASIS heights are level.  She is able to stand readily, she does have reproduction of the anterior medial hip pain when laying on her back with internal leg rotation as well.  She is able to flex fully without discomfort.  She has no significant bulge palpable near the area of the joint line pain including with forward flexion of her abdomen.  She does have a somewhat tight right psoas muscle which is mildly tender to palpation.    Plan  -Demonstrated piriformis stretching exercises to be done 30 seconds each 3 times daily  -Remove phone from right back pocket, no items in back pocket  -Activity modification (but not elimination) while healing.  -Psoas stretching exercises demonstrated  -Physical therapy referral, appreciate support  -Return to clinic in 1 month for follow-up.    yeast  "infx  -Improved with over-the-counter Monistat.  No dysuria.  No further intervention needed.    Return to clinic in 1 month to follow-up on hip discomfort and also screening labs (CBC normal with possible history of anemia, RPR normal,/lipids within normal limits given history of \"elevated lipids).  "

## 2022-05-19 NOTE — PATIENT INSTRUCTIONS
Get Diclofenac/voltaren gel over the counter, can be used over top painful area/joint areas    Dr Buchanan will see you in a month!

## 2022-05-19 NOTE — ASSESSMENT & PLAN NOTE
R hip pain for 2 months, worst with internal rotation, in both groin/SI joint. No significant changes in activity/trauma.  -PT referral  -stretches demonstrated during visit  -NSAIDs as needed  -Diclofenac gel 1% discussed  -continued exercise/activity as tolerated  -follow up 1 month

## 2022-05-23 DIAGNOSIS — G57.01 PIRIFORMIS SYNDROME OF RIGHT SIDE: ICD-10-CM

## 2022-06-01 DIAGNOSIS — G57.01 PIRIFORMIS SYNDROME OF RIGHT SIDE: ICD-10-CM

## 2022-06-22 ENCOUNTER — OFFICE VISIT (OUTPATIENT)
Dept: INTERNAL MEDICINE | Facility: OTHER | Age: 47
End: 2022-06-22
Payer: MEDICAID

## 2022-06-22 VITALS
HEART RATE: 73 BPM | TEMPERATURE: 97.9 F | SYSTOLIC BLOOD PRESSURE: 102 MMHG | DIASTOLIC BLOOD PRESSURE: 70 MMHG | OXYGEN SATURATION: 98 % | BODY MASS INDEX: 25.16 KG/M2 | HEIGHT: 63 IN | WEIGHT: 142 LBS

## 2022-06-22 DIAGNOSIS — G57.01 PIRIFORMIS SYNDROME OF RIGHT SIDE: ICD-10-CM

## 2022-06-22 DIAGNOSIS — L30.9 DERMATITIS: ICD-10-CM

## 2022-06-22 PROCEDURE — 99213 OFFICE O/P EST LOW 20 MIN: CPT | Mod: GE | Performed by: STUDENT IN AN ORGANIZED HEALTH CARE EDUCATION/TRAINING PROGRAM

## 2022-06-22 RX ORDER — FLUCONAZOLE 150 MG/1
TABLET ORAL
COMMUNITY
Start: 2022-05-23 | End: 2022-06-22

## 2022-06-22 RX ORDER — TRIAMCINOLONE ACETONIDE 1 MG/G
CREAM TOPICAL
Qty: 1 EACH | Refills: 1 | Status: SHIPPED | OUTPATIENT
Start: 2022-06-22 | End: 2023-06-26

## 2022-06-22 NOTE — PATIENT INSTRUCTIONS
-Exercise and balanced nutrition  -Take medications as prescribed  -Follow-up with physicial therapy  -Follow-up with PCP as scheduled or return to clinic earlier if any symptoms.

## 2022-06-22 NOTE — PROGRESS NOTES
"    Established Patient    Patient Care Team:  Troy Buchanan D.O. as PCP - General (Internal Medicine)  Pcp Pt States None (Family Medicine)  Juan Vizcarra M.D. (Internal Medicine)    Sofia Viramontes is a 46 y.o. female who presents today with the following Chief Complaint(s): Follow up for There were no encounter diagnoses.    HPI:    Is a 46-year-old female came for an office follow-up visit and discuss the following    #Pyriformis syndrome  -Diagnosed recently during previous visit, exercises regularly, feels significantly better, scheduled to see PT tomorrow.  Denies any complaints    #Redness of labial folds  #?  Atopic dermatitis  -Mild erythema, itching, no other symptoms    No problems updated.     ROS:     Denies any new chest pain or shortness of breath.  No changes to urinary or bowel function. See HPI.    Past Medical History:   Diagnosis Date   • Acute mastitis of left breast 11/2/2018   • Blood clotting disorder (HCC)     hx. left arm post hospitalization   • Snoring     no cpap     Social History     Tobacco Use   • Smoking status: Never Smoker   • Smokeless tobacco: Never Used   Vaping Use   • Vaping Use: Never used   Substance Use Topics   • Alcohol use: No   • Drug use: No     Current Outpatient Medications   Medication Sig Dispense Refill   • levonorgestrel-ethinyl estradiol (NORDETTE) 0.15-30 MG-MCG per tablet Take 1 Tablet by mouth every day.     • ibuprofen (MOTRIN) 200 MG Tab Take 800 mg by mouth every 6 hours as needed for Mild Pain.     • VITAMIN D, CHOLECALCIFEROL, PO Take 500 mg by mouth every day at 6 PM.       No current facility-administered medications for this visit.     Physical Exam:  /70 (BP Location: Right arm, Patient Position: Sitting, BP Cuff Size: Adult)   Pulse 73   Temp 36.6 °C (97.9 °F) (Temporal)   Ht 1.6 m (5' 3\")   Wt 64.4 kg (142 lb)   SpO2 98%   BMI 25.15 kg/m²   General: Well developed, well nourished female, in no distress.  Eyes: Conjuntiva " without any obvious injection or erythema.   Cardiovascular: Heart is regular with no murmur  Lungs: Clear to auscultation bilaterally. No wheezes, rhonci or crackles heard. Respiratory effort is normal.  Abd: Soft, non-tender  Ext: No edema    Assessment and Plan:     1.  Piriformis syndrome of right side  -Doing well, symptoms improved significantly, education and counseling provided  -Continue doing the exercises recommended and follow-up with physical therapy.    2.  Dermatitis of nasolabial folds  -Benign exam findings, has been struggling for almost a month with no improvement.  -Education and counseling provided, recommended topical emollients regularly  -Topical triamcinolone prescribed, can be tried if no improvement with topical emollients.    Return in about 3 months (around 9/22/2022).  Patient Instructions   -Exercise and balanced nutrition  -Take medications as prescribed  -Follow-up with physicial therapy  -Follow-up with PCP as scheduled or return to clinic earlier if any symptoms.

## 2022-09-19 NOTE — PROGRESS NOTES
Please abstract the following data from this visit with this patient into the appropriate field in Epic:  Tests that can be patient reported without a hard copy:  Eye exam with ophthalmology on this date: 9/1/22 Lucretia Gibson Pt d/c home. Script and d/c instructions given to pt

## 2023-05-30 ENCOUNTER — HOSPITAL ENCOUNTER (OUTPATIENT)
Dept: RADIOLOGY | Facility: MEDICAL CENTER | Age: 48
End: 2023-05-30
Attending: OBSTETRICS & GYNECOLOGY
Payer: COMMERCIAL

## 2023-05-30 DIAGNOSIS — Z12.31 VISIT FOR SCREENING MAMMOGRAM: ICD-10-CM

## 2023-05-30 PROCEDURE — 77063 BREAST TOMOSYNTHESIS BI: CPT

## 2023-06-26 ENCOUNTER — OFFICE VISIT (OUTPATIENT)
Dept: INTERNAL MEDICINE | Facility: OTHER | Age: 48
End: 2023-06-26
Payer: COMMERCIAL

## 2023-06-26 VITALS
SYSTOLIC BLOOD PRESSURE: 104 MMHG | BODY MASS INDEX: 25.71 KG/M2 | HEART RATE: 78 BPM | DIASTOLIC BLOOD PRESSURE: 74 MMHG | HEIGHT: 64 IN | TEMPERATURE: 98.3 F | WEIGHT: 150.6 LBS | OXYGEN SATURATION: 98 %

## 2023-06-26 DIAGNOSIS — Z00.00 ENCOUNTER FOR ANNUAL PHYSICAL EXAM: ICD-10-CM

## 2023-06-26 DIAGNOSIS — L85.3 DRY SKIN: ICD-10-CM

## 2023-06-26 DIAGNOSIS — J30.2 SEASONAL ALLERGIES: ICD-10-CM

## 2023-06-26 DIAGNOSIS — R53.83 FATIGUE, UNSPECIFIED TYPE: ICD-10-CM

## 2023-06-26 PROBLEM — K64.9 HEMORRHOIDS: Status: ACTIVE | Noted: 2023-06-26

## 2023-06-26 PROCEDURE — 3074F SYST BP LT 130 MM HG: CPT

## 2023-06-26 PROCEDURE — 3078F DIAST BP <80 MM HG: CPT

## 2023-06-26 PROCEDURE — 99214 OFFICE O/P EST MOD 30 MIN: CPT | Mod: GC

## 2023-06-26 RX ORDER — CETIRIZINE HYDROCHLORIDE 10 MG/1
10 TABLET ORAL DAILY
Qty: 90 TABLET | Refills: 1 | Status: SHIPPED | OUTPATIENT
Start: 2023-06-26 | End: 2024-01-15

## 2023-06-26 RX ORDER — FLUTICASONE PROPIONATE 50 MCG
1 SPRAY, SUSPENSION (ML) NASAL DAILY
Qty: 16 G | Refills: 3 | Status: SHIPPED | OUTPATIENT
Start: 2023-06-26 | End: 2023-09-29

## 2023-06-26 RX ORDER — LORATADINE 10 MG/1
10 TABLET ORAL DAILY
COMMUNITY
End: 2023-06-26

## 2023-06-26 ASSESSMENT — PATIENT HEALTH QUESTIONNAIRE - PHQ9: CLINICAL INTERPRETATION OF PHQ2 SCORE: 0

## 2023-06-26 NOTE — PROGRESS NOTES
Established Patient    Patient Care Team:  Troy Buchanan D.O. as PCP - General (Internal Medicine)  Pcp Pt States None (Family Medicine)  Juan Vizcarra M.D. (Internal Medicine)    Sofia Viramontes is a 47 y.o. female who presents today with the following Chief Complaint(s): Follow up for Diagnoses of Encounter for annual physical exam, Fatigue, unspecified type, Dry skin, and Seasonal allergies were pertinent to this visit.    HPI:  Patient is a 47 year-old female with pmh of seasonal allergies who presents to the clinic today for an annual exam.     Experiencing fatigue for about a year. Thinks this is likely due to her snoring. She gets 6-7 hours of uninterrupted sleep, however wakes up feeling tired. No morning headaches. No recent illness. No depression. Does sleep on her back.     Has had seasonal allergies for over 10 years and has been taking loratadine on and off. Does state for the past 2 months she has been taking loratadine everyday, however has been experiencing symptoms of itchy eyes, runny nose, sneezing, post nasal drip and coughing. Also, mentions dry skin of the hands and feet that sometimes itch. Does wash hands a lot and uses hot water. Applies lotion to the areas as needed.    Healthcare Maintenance  Col[facility]oscopy:In [facility] 20[facility]  HIV: Negative, April 28, 2022 with [facility]   Syphilis: Negative May 18, 2022  Hep C: Negative, April 28, 2022 with [facility]   A1c:   ASCVD: 0.5%, low  Smoking: No smoking   Alcohol Use: No alcohol.   Recreational Drugs: No drug use   Healthy Eating/Exercise: Gained 15-20 lbs this year. Trying to cut back on sugar and flour products. Exercise 15-30 minutes of yoga 3-4 times a week. Starting ultimate frisbee.   Depression and Anxiety: PHQ-9 and Ed-7 score 0   Immunizations: UTD  Mammogram: Mammogram negative 5/2023  [facility]: May 2023 [facility]  DEXA: Not indicated     ROS:     General: Weight gain. No fevers, chills,  "night sweats, weight loss   HEENT: No hearing changes, vision changes, eye pain, ear pain, nasal discharge, sore throat  Neck: No swelling in neck  Pulmonary: No shortness of breath, cough, sputum, or hemoptysis  Cardiovascular: No chest pain, palpitations, or LE swelling  GI: No nausea, vomiting, diarrhea, constipation, abdominal pain, hematochezia or melena  : No dysuria or frequency  Neuro: No focal weakness, no general weakness, no headaches, no lightheadedness, no dizziness  Psych: No anxiety or depression    Past Medical History:   Diagnosis Date    Acute mastitis of left breast 11/2/2018    Blood clotting disorder (HCC)     hx. left arm post hospitalization    Snoring     no cpap     Social History     Tobacco Use    Smoking status: Never    Smokeless tobacco: Never   Vaping Use    Vaping Use: Never used   Substance Use Topics    Alcohol use: No    Drug use: No     Current Outpatient Medications   Medication Sig Dispense Refill    multivitamin Tab Take 1 Tablet by mouth every day.      fluticasone (FLONASE) 50 MCG/ACT nasal spray Administer 1 Spray into affected nostril(S) every day. 16 g 3    cetirizine (ZYRTEC) 10 MG Tab Take 1 Tablet by mouth every day. 90 Tablet 1    ibuprofen (MOTRIN) 200 MG Tab Take 800 mg by mouth every 6 hours as needed for Mild Pain.       No current facility-administered medications for this visit.       Physical Exam:  /74 (BP Location: Left arm, Patient Position: Sitting, BP Cuff Size: Adult)   Pulse 78   Temp 36.8 °C (98.3 °F) (Temporal)   Ht 1.626 m (5' 4\")   Wt 68.3 kg (150 lb 9.6 oz)   SpO2 98%   BMI 25.85 kg/m²   General: Well developed, well nourished female, in no distress.  HEENT: NC/AT, PERRL, EOMI, no scleral icterus or conjunctival pallor, fair dentition, no nasal discharge or oral erythema or exudates.   Neck: Supple, No cervical or supraclavicular LAD  CV:RRR, no murmurs gallops or Rubs, no JVD  Pulm: LCAB, no crackles, rales, rhonchi, or wheezing  GI: " Normal bowel sounds, abdomen soft, nontender, nondistended to deep or light palpation in all 4 quadrants, no HSM.  MSK: Radial and dorsalis pedis pulses 2+ and equal bilaterally, respectively.  Strength 5 out of 5 in upper and lower extremities.  No lower extremity edema  Neuro: Patient is alert and oriented x3, no focal deficits  Psych: Appropriate mood and affect       Assessment and Plan:   1. Encounter for annual physical exam  UTD on age appropriate health screens and vaccinations  Will [facility]  Will screen for diabetes  - HEMOGLOBIN A1C    2. Fatigue, unspecified type  STOP-BANG 2 (snoring,fatigue) with Mallampati score 1  PHQ-9 score 0  Patient low risk for sleep apnea, will rule out other organic causes of fatigue such as thyroid issues, anemia, electrolyte imbalance and vitamin D.  - Comp Metabolic Panel; Future  - CBC WITH DIFFERENTIAL; Future  - TSH+FREE T4  - VITAMIN D,25 HYDROXY (DEFICIENCY); Future    3. Dry skin  Discussed using Cetaphil or Cerave on dry skin and measures to avoid dry skin which include avoid hot water and applying lotion after washing.   Will rule out thyroid issue  - TSH+FREE T4    4. Seasonal allergies  Discussed with patient switching to Allegra (fexofenadine) or Zyrtec (Cetirizine)  Flonase for runny nose  Allergy eye drops for itchy eyes      Return in about 6 weeks (around 8/7/2023).    Patient Instructions   -Get labs prior to next appointment, 6 weeks  -For dry skin consider using Cetaphil or Cerave. Limit sun exposure, moisturize after washing hands and avoid hot water. Also consider applying at night time.   -For seasonal allergies, consider switching to other medications Allegra (fexofenadine), Zyrtec (Cetirizine).   -Use flonase for runny nose.   -Use allergy eye drops for eyes   -It was nice visiting with you today!      Gertrude Newton M.D. PGY-2  [facility]    This note was  created using voice recognition software.  While every attempt is made to ensure accuracy of transcription, occasionally errors occur.

## 2023-06-26 NOTE — PATIENT INSTRUCTIONS
-Get labs prior to next appointment, 6 weeks  -For dry skin consider using Cetaphil or Cerave. Limit sun exposure, moisturize after washing hands and avoid hot water. Also consider applying at night time.   -For seasonal allergies, consider switching to other medications Allegra (fexofenadine), Zyrtec (Cetirizine).   -Use flonase for runny nose.   -Use allergy eye drops for eyes   -It was nice visiting with you today!

## 2023-07-01 ENCOUNTER — HOSPITAL ENCOUNTER (OUTPATIENT)
Dept: LAB | Facility: MEDICAL CENTER | Age: 48
End: 2023-07-01
Payer: COMMERCIAL

## 2023-07-01 DIAGNOSIS — Z00.00 ENCOUNTER FOR ANNUAL PHYSICAL EXAM: ICD-10-CM

## 2023-07-01 DIAGNOSIS — R53.83 FATIGUE, UNSPECIFIED TYPE: ICD-10-CM

## 2023-07-01 DIAGNOSIS — L85.3 DRY SKIN: ICD-10-CM

## 2023-07-01 LAB
25(OH)D3 SERPL-MCNC: 25 NG/ML (ref 30–100)
ALBUMIN SERPL BCP-MCNC: 4.2 G/DL (ref 3.2–4.9)
ALBUMIN/GLOB SERPL: 1.6 G/DL
ALP SERPL-CCNC: 46 U/L (ref 30–99)
ALT SERPL-CCNC: 8 U/L (ref 2–50)
ANION GAP SERPL CALC-SCNC: 9 MMOL/L (ref 7–16)
AST SERPL-CCNC: 14 U/L (ref 12–45)
BASOPHILS # BLD AUTO: 1.3 % (ref 0–1.8)
BASOPHILS # BLD: 0.07 K/UL (ref 0–0.12)
BILIRUB SERPL-MCNC: 0.6 MG/DL (ref 0.1–1.5)
BUN SERPL-MCNC: 11 MG/DL (ref 8–22)
CALCIUM ALBUM COR SERPL-MCNC: 8.5 MG/DL (ref 8.5–10.5)
CALCIUM SERPL-MCNC: 8.7 MG/DL (ref 8.5–10.5)
CHLORIDE SERPL-SCNC: 103 MMOL/L (ref 96–112)
CO2 SERPL-SCNC: 25 MMOL/L (ref 20–33)
CREAT SERPL-MCNC: 0.83 MG/DL (ref 0.5–1.4)
EOSINOPHIL # BLD AUTO: 0.29 K/UL (ref 0–0.51)
EOSINOPHIL NFR BLD: 5.6 % (ref 0–6.9)
ERYTHROCYTE [DISTWIDTH] IN BLOOD BY AUTOMATED COUNT: 44.5 FL (ref 35.9–50)
EST. AVERAGE GLUCOSE BLD GHB EST-MCNC: 103 MG/DL
FASTING STATUS PATIENT QL REPORTED: NORMAL
GFR SERPLBLD CREATININE-BSD FMLA CKD-EPI: 86 ML/MIN/1.73 M 2
GLOBULIN SER CALC-MCNC: 2.7 G/DL (ref 1.9–3.5)
GLUCOSE SERPL-MCNC: 88 MG/DL (ref 65–99)
HBA1C MFR BLD: 5.2 % (ref 4–5.6)
HCT VFR BLD AUTO: 41.8 % (ref 37–47)
HGB BLD-MCNC: 13.7 G/DL (ref 12–16)
IMM GRANULOCYTES # BLD AUTO: 0.01 K/UL (ref 0–0.11)
IMM GRANULOCYTES NFR BLD AUTO: 0.2 % (ref 0–0.9)
LYMPHOCYTES # BLD AUTO: 1.76 K/UL (ref 1–4.8)
LYMPHOCYTES NFR BLD: 33.7 % (ref 22–41)
MCH RBC QN AUTO: 30.9 PG (ref 27–33)
MCHC RBC AUTO-ENTMCNC: 32.8 G/DL (ref 32.2–35.5)
MCV RBC AUTO: 94.1 FL (ref 81.4–97.8)
MONOCYTES # BLD AUTO: 0.4 K/UL (ref 0–0.85)
MONOCYTES NFR BLD AUTO: 7.7 % (ref 0–13.4)
NEUTROPHILS # BLD AUTO: 2.69 K/UL (ref 1.82–7.42)
NEUTROPHILS NFR BLD: 51.5 % (ref 44–72)
NRBC # BLD AUTO: 0 K/UL
NRBC BLD-RTO: 0 /100 WBC (ref 0–0.2)
PLATELET # BLD AUTO: 263 K/UL (ref 164–446)
PMV BLD AUTO: 10.1 FL (ref 9–12.9)
POTASSIUM SERPL-SCNC: 4.3 MMOL/L (ref 3.6–5.5)
PROT SERPL-MCNC: 6.9 G/DL (ref 6–8.2)
RBC # BLD AUTO: 4.44 M/UL (ref 4.2–5.4)
SODIUM SERPL-SCNC: 137 MMOL/L (ref 135–145)
T4 FREE SERPL-MCNC: 1.07 NG/DL (ref 0.93–1.7)
TSH SERPL DL<=0.005 MIU/L-ACNC: 1.9 UIU/ML (ref 0.38–5.33)
WBC # BLD AUTO: 5.2 K/UL (ref 4.8–10.8)

## 2023-07-01 PROCEDURE — 83036 HEMOGLOBIN GLYCOSYLATED A1C: CPT

## 2023-07-01 PROCEDURE — 85025 COMPLETE CBC W/AUTO DIFF WBC: CPT

## 2023-07-01 PROCEDURE — 36415 COLL VENOUS BLD VENIPUNCTURE: CPT

## 2023-07-01 PROCEDURE — 84439 ASSAY OF FREE THYROXINE: CPT

## 2023-07-01 PROCEDURE — 82306 VITAMIN D 25 HYDROXY: CPT

## 2023-07-01 PROCEDURE — 84443 ASSAY THYROID STIM HORMONE: CPT

## 2023-07-01 PROCEDURE — 80053 COMPREHEN METABOLIC PANEL: CPT

## 2023-08-09 ENCOUNTER — OFFICE VISIT (OUTPATIENT)
Dept: INTERNAL MEDICINE | Facility: OTHER | Age: 48
End: 2023-08-09
Payer: COMMERCIAL

## 2023-08-09 VITALS
HEIGHT: 64 IN | SYSTOLIC BLOOD PRESSURE: 107 MMHG | WEIGHT: 148.6 LBS | DIASTOLIC BLOOD PRESSURE: 78 MMHG | TEMPERATURE: 97.4 F | OXYGEN SATURATION: 97 % | BODY MASS INDEX: 25.37 KG/M2 | HEART RATE: 76 BPM

## 2023-08-09 DIAGNOSIS — R60.9 SWELLING: ICD-10-CM

## 2023-08-09 DIAGNOSIS — R06.83 SNORING: ICD-10-CM

## 2023-08-09 PROCEDURE — 99214 OFFICE O/P EST MOD 30 MIN: CPT | Performed by: INTERNAL MEDICINE

## 2023-08-09 PROCEDURE — 3074F SYST BP LT 130 MM HG: CPT | Performed by: INTERNAL MEDICINE

## 2023-08-09 PROCEDURE — 3078F DIAST BP <80 MM HG: CPT | Performed by: INTERNAL MEDICINE

## 2023-08-09 ASSESSMENT — FIBROSIS 4 INDEX: FIB4 SCORE: 0.92

## 2023-08-09 NOTE — PROGRESS NOTES
"Subjective     Sofia Viramontes is a 46 y.o. 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    Patient reports has chronically been snoring, believes it is loud enough that others outside of her room can hear it, is also disrupting sleep with her partner.  Notes daytime fatigue/sleepiness at times.  Has a family history of obstructive sleep apnea.  Has trialed many interventions without success.  Has issues lying on her side to sleep given hip discomfort.    Note bilateral swelling under her armpits that started spontaneously within the last 2 weeks around the time that her daughter had a viral infection.  She never felt sick with infectious symptoms, daughter now is well, armpit swelling is improving.  No fevers, chills, drenching night sweats, unexpected weight loss.  Recent mammography normal.                ROS               Objective     /78 (BP Location: Right arm, Patient Position: Sitting, BP Cuff Size: Adult)   Pulse 76   Temp 36.3 °C (97.4 °F) (Temporal)   Ht 1.626 m (5' 4\")   Wt 67.4 kg (148 lb 9.6 oz)   SpO2 97%   BMI 25.51 kg/m²      Physical Exam    General: Pleasant, upbeat woman in no apparent distress, alert, responding appropriately  Lymphatics: No cervical, clavicular, submandibular, or axillary lymphadenopathy.  Perhaps some mild prominence bilaterally of the tissue under the axilla, left more than right.  Patient " "denied inguinal lymphadenopathy, no lymphadenopathy appreciated grossly through clothing, Elaina MS 3 present as chaparone.  HEENT: Patient reports no issues with breathing when occluding either left or right nostril, although is a slight airflow difference between the 2.  No audible noise appreciated.  Mallampati 3.                     Assessment & Plan     Snoring, possibly concerning for obstructive sleep apnea  -Given the loudness of snoring, sometimes daytime sleepiness, family history of JESSIKA, no significant congestion reported, trial of other interventions has not helped, and patient sensation that \"tongue is falling back against her throat\" in the back of her mouth, clinical picture could be consistent with sleep apnea.    Plan:  -Referral to sleep medicine for further evaluation/sleep study, appreciate support    B/l axillary swelling, possibly secondary to viral infection, improving  -no cervical, axilary, inguinal (through paints, Elaina ms3 chaparone) lymphadenopathy present, no B symptoms, symptoms are improving.  Recent mammogram normal.  Unclear etiology, but could be related to a subclinical viral infection that she cleared.  -offered cbc for more referral assessment, patient prefers to monitor given improvement.  Will follow clinically.     Request colonoscopy records, pt reports normal this year.      From Prior Visit, Not Addressed Today:    History of anemia, history of thrombocytosis  -Had a normocytic anemia and thrombocytosis many years ago on the labs that appears was not repeated, perhaps around the time of her mastitis biopsy.  We will repeat CBC to trend.  Asymptomatic today.  7/1 - cbc with diff nl, monitor clinically.    calvin allergy - mild, reports is able to tolerate small amounts without any symptoms at all, never had anaphylaxis.  Discussed possibility of carrying epipen, patient politely declines at present given mild nature of symptoms.  States has not had more significant symptoms " for 20 years.  Will plan to evaluate more fully at future visit, cautioned any sob/throat swelling  or severe lightheadendes after eating to go to ED for evaluation.    Discussion on vitamins/supplements  -Patient reports taking cranberry pills, Azo supplements for urinary symptoms/prevention, and also questions the utility of a multivitamin.  Discussed that if she is eating a balanced diet there is no need for a multivitamin per current literature.  Discussed that Azo pills simply mask symptoms of urinary tract infection and do not treat or prevent them, and encouraged her to discontinue use.  Discussed that my understanding is that cranberry juice has not been shown to have a significant intervention in terms of reducing UTIs, and that hydration itself is more effective, although I will need to review this to see if any new or literature exists.    Return to clinic 2 months to follow-up on sleep study results, ? Mild L thyroid prominence - repeat thyroid exam, hip pain if still bothering Ms. Viramontes.  then review ? Arm swelling in chart and prior mild headache (lasted a few seconds then resolved), Dry skin on hands and feet,  . then annual exam due 6/2024 (done by Dr. Newton).

## 2023-10-20 ENCOUNTER — OFFICE VISIT (OUTPATIENT)
Dept: INTERNAL MEDICINE | Facility: OTHER | Age: 48
End: 2023-10-20
Payer: COMMERCIAL

## 2023-10-20 VITALS
HEIGHT: 64 IN | TEMPERATURE: 98.2 F | BODY MASS INDEX: 25.2 KG/M2 | HEART RATE: 76 BPM | OXYGEN SATURATION: 97 % | WEIGHT: 147.6 LBS | SYSTOLIC BLOOD PRESSURE: 121 MMHG | DIASTOLIC BLOOD PRESSURE: 75 MMHG

## 2023-10-20 DIAGNOSIS — M25.551 BILATERAL HIP PAIN: ICD-10-CM

## 2023-10-20 DIAGNOSIS — M25.552 BILATERAL HIP PAIN: ICD-10-CM

## 2023-10-20 PROCEDURE — 3078F DIAST BP <80 MM HG: CPT | Performed by: INTERNAL MEDICINE

## 2023-10-20 PROCEDURE — 99214 OFFICE O/P EST MOD 30 MIN: CPT | Performed by: INTERNAL MEDICINE

## 2023-10-20 PROCEDURE — 3074F SYST BP LT 130 MM HG: CPT | Performed by: INTERNAL MEDICINE

## 2023-10-20 ASSESSMENT — FIBROSIS 4 INDEX: FIB4 SCORE: 0.94

## 2023-10-20 NOTE — PROGRESS NOTES
"Subjective     Sofia Viramontes is a 46 y.o. 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    Patient reports significant improvement after initial assessment many months ago for hip discomfort which was believed to be secondary to piriformis syndrome, possible mild osteoarthritis, and overall tight hip musculature including a tight right psoas muscle.  No longer has radicular symptoms.  Reports pinpoint sharp pain midway between the spine midline and the lateral aspect of the back on the posterior hip at the top of the hip bone/top of the buttock, also similar pain in the front of the hip, and also similar pain at a third location in the right groin.  Reports that this pain does not occur when walking, primarily occurs when laying on her side at night to prevent snoring.  No trauma to the hip.  No fevers.  Sometimes has some similar hip pain on L when lying on side.  No alcohol or chronic prednisone use.  Overall hip symptomatology has significantly improved compared to prior exam.                    ROS               Objective     /75 (BP Location: Right arm, Patient Position: Sitting, BP Cuff Size: Adult)   Pulse 76   Temp 36.8 °C (98.2 °F) (Temporal)   Ht 1.626 m (5' 4\")   Wt 67 kg (147 lb 9.6 oz)   SpO2 97%   BMI 25.34 kg/m²      Physical Exam    General: Pleasant woman in no apparent distress, alert, responding appropriately.  Partner accompanies  Musculoskeletal: Bilateral ASIS symmetric.  No " significant pain with compression of the hips bilaterally.  No significant tenderness to palpation on the superior aspect of the posterior iliac crest on the right near the area of discomfort.  Right psoas muscle is soft and nontender with palpation lateral to the ASIS.  No tenderness palpation of the midline lumbar spine or of the PSIS bilaterally.  No tenderness with internal rotation of the hip bilaterally, good internal rotation range of motion that is symmetric and close to 45 degrees bilaterally.  Negative SARA testing bilaterally.  Gait is smooth and neutral, no pronation or supination of feet while walking.  Neg Capone test b/l.                       Assessment & Plan     Possible Tight hip musculature causing intermittent pain  -no alarm sx. no focal issues on exam  -suspect  mild hip muscle tightness which may be residually contributing to her symptoms.  Hips appear to be in a neutral position.  Significantly improved from prior visit with resolution of piriformis syndrome, R psoas now normalized.    Plan  -Offered to repeat physical therapy with slightly different therapeutic approach for current symptoms, patient prefers to try home physical therapy.  Handout given for possible exercises that can help with hip mobilization.  Also gave recommendations for possible more intensive mobilization via Dr. Cedillo mobility book, to be used with caution  -patient reports going to a chiropractor, discussed mentioning possibility of discussion with him to see if can assist with hip mobility  -emphasized the importance of neutral alignment, including topics such as using both straps on a backpack instead of just one  -patient will message to update on progress - if still no improvement with above could consider formal sports medicine/[facility] referral.    B/l axillary swelling, possibly secondary to viral infection, improving  -resolved    seasonal allergies s/p immunotherapy as a child  -not directly addressed  "today, but patient reported doing well on Flonase during recent refill request.  Can follow clinically at future visit.     Sleep study pending.    From Prior Visit, Not Addressed Today:    Snoring, possibly concerning for obstructive sleep apnea  -Given the loudness of snoring, sometimes daytime sleepiness, family history of JESSIKA, no significant congestion reported, trial of other interventions has not helped, and patient sensation that \"tongue is falling back against her throat\" in the back of her mouth, clinical picture could be consistent with sleep apnea.    Plan:  -Referral to sleep medicine for further evaluation/sleep study, appreciate support  Today: sleep eval pending for 3/2024, awaiting results      History of anemia, history of thrombocytosis  -Had a normocytic anemia and thrombocytosis many years ago on the labs that appears was not repeated, perhaps around the time of her mastitis biopsy.  We will repeat CBC to trend.  Asymptomatic today.  7/1 - cbc with diff nl, monitor clinically.    calvin allergy - mild, reports is able to tolerate small amounts without any symptoms at all, never had anaphylaxis.  Discussed possibility of carrying epipen, patient politely declines at present given mild nature of symptoms.  States has not had more significant symptoms for 20 years.  Will plan to evaluate more fully at future visit, cautioned any sob/throat swelling  or severe lightheadendes after eating to go to ED for evaluation.    Discussion on vitamins/supplements  -Patient reports taking cranberry pills, Azo supplements for urinary symptoms/prevention, and also questions the utility of a multivitamin.  Discussed that if she is eating a balanced diet there is no need for a multivitamin per current literature.  Discussed that Azo pills simply mask symptoms of urinary tract infection and do not treat or prevent them, and encouraged her to discontinue use.  Discussed that my understanding is that cranberry juice has " not been shown to have a significant intervention in terms of reducing UTIs, and that hydration itself is more effective, although I will need to review this to see if any new or literature exists.    Healthcare Maintenance  -normal colonscopy confirmed, repeat 10 years 2/2033    Return to clinic 6/2024 for annual exam/? Mild L thyroid prominence - repeat thyroid exam, then discuss seasonal allergies

## 2024-01-15 RX ORDER — CETIRIZINE HYDROCHLORIDE 10 MG/1
10 TABLET ORAL DAILY
Qty: 90 TABLET | Refills: 1 | Status: SHIPPED | OUTPATIENT
Start: 2024-01-15

## 2024-02-12 RX ORDER — FLUTICASONE PROPIONATE 50 MCG
1 SPRAY, SUSPENSION (ML) NASAL DAILY
Qty: 15.8 ML | Refills: 0 | Status: SHIPPED | OUTPATIENT
Start: 2024-02-12 | End: 2024-03-15

## 2024-02-12 NOTE — TELEPHONE ENCOUNTER
Received request via: Pharmacy    Was the patient seen in the last year in this department? Yes    Does the patient have an active prescription (recently filled or refills available) for medication(s) requested? No    Pharmacy Name:  [id number] - [place], [place] - [address]     Does the patient have custodial Plus and need 100 day supply (blood pressure, diabetes and cholesterol meds only)? Patient does not have SCP

## 2024-03-19 ASSESSMENT — ENCOUNTER SYMPTOMS: SLEEP DISTURBANCE: 0

## 2024-03-20 ENCOUNTER — OFFICE VISIT (OUTPATIENT)
Dept: SLEEP MEDICINE | Facility: MEDICAL CENTER | Age: 49
End: 2024-03-20
Attending: INTERNAL MEDICINE
Payer: COMMERCIAL

## 2024-03-20 VITALS
WEIGHT: 150 LBS | DIASTOLIC BLOOD PRESSURE: 78 MMHG | HEART RATE: 72 BPM | RESPIRATION RATE: 16 BRPM | BODY MASS INDEX: 24.11 KG/M2 | SYSTOLIC BLOOD PRESSURE: 122 MMHG | HEIGHT: 66 IN | OXYGEN SATURATION: 98 %

## 2024-03-20 DIAGNOSIS — G47.30 SLEEP DISORDER BREATHING: Primary | ICD-10-CM

## 2024-03-20 DIAGNOSIS — R06.81 WITNESSED APNEIC SPELLS: ICD-10-CM

## 2024-03-20 DIAGNOSIS — R06.83 SNORING: ICD-10-CM

## 2024-03-20 DIAGNOSIS — G47.19 EXCESSIVE DAYTIME SLEEPINESS: ICD-10-CM

## 2024-03-20 DIAGNOSIS — R06.89 GASPING FOR BREATH: ICD-10-CM

## 2024-03-20 PROCEDURE — 3078F DIAST BP <80 MM HG: CPT | Performed by: STUDENT IN AN ORGANIZED HEALTH CARE EDUCATION/TRAINING PROGRAM

## 2024-03-20 PROCEDURE — 99212 OFFICE O/P EST SF 10 MIN: CPT | Performed by: INTERNAL MEDICINE

## 2024-03-20 PROCEDURE — 99203 OFFICE O/P NEW LOW 30 MIN: CPT | Performed by: STUDENT IN AN ORGANIZED HEALTH CARE EDUCATION/TRAINING PROGRAM

## 2024-03-20 PROCEDURE — 3074F SYST BP LT 130 MM HG: CPT | Performed by: STUDENT IN AN ORGANIZED HEALTH CARE EDUCATION/TRAINING PROGRAM

## 2024-03-20 ASSESSMENT — PATIENT HEALTH QUESTIONNAIRE - PHQ9: CLINICAL INTERPRETATION OF PHQ2 SCORE: 0

## 2024-03-20 ASSESSMENT — FIBROSIS 4 INDEX: FIB4 SCORE: 0.94

## 2024-03-20 NOTE — PROGRESS NOTES
[facility] Sleep Center Consult Note     Date: 3/20/2024 / Time: 2:15 PM      Thank you for requesting a sleep medicine consultation on Sofia Viramontes at the sleep center. Presents today with the chief complaints of snoring, witnessed apneas, gasping in sleep, occasional excessive daytime sleepiness. She is referred by Troy Buchanan D.O.  [address] [facility] [place] [id number] for evaluation and treatment of sleep disorder breathing .     HISTORY OF PRESENT ILLNESS:     Sofia Viramontes is a 48 y.o. female with allergic rhinitis, snoring.  Presents to Sleep Clinic for evaluation of sleep.     She states she has been told that she snores in her sleep for years.  She states her snoring is causing disruptions to her partner sleep.  In addition to the snoring she does have pauses in her breathing and gasping episodes at times.  During the day she can be tired.  She notices afternoon fatigue.  She does have sleepiness at times.  She does have brain fog at times.  She can be irritable as well.  She does not find her sleep restorative and she will occasionally wake with headaches.    As per supplemental questionnaire to be scanned or imported into chart:    [place] Sleepiness Score: 15    Sleep Schedule  Bedtime: Weekday & Weekend 11-1130pm  Wake time: Weekday 6-625am Weekend 7-830am  Sleep-onset latency: 5-15 min   Awakenings from sleep: 0-2  Difficulty falling back asleep: No  Bedroom partner: yes  Naps: No     DAYTIME SYMPTOMS:   Excessive daytime sleepiness: Yes  Daytime fatigue: Yes in afternoons   Difficulty concentrating: Yes slight   Memory problems: Yes sometimes   Irritability:Yes sometimes  Work/school performance issues: No   Sleepiness with driving: Yes long drives more than 1 hour   Caffeine/stimulant use: Yes  Alcohol use:No     SLEEP RELATED SYMPTOMS  Snoring: Yes  Witnessed apnea or gasping/choking: Yes,   Dry mouth or mouth breathing: No   Sweating: Yes sometimes   Teeth  "grinding/biting: No   Morning headaches: Yes  Refreshed Upon Awakening: No      SLEEP RELATED BEHAVIORS:  Parasomnias (walking, talking, eating, violence): No   Leg kicking: No   Restless legs - \"urge to move\": No   Nightmares: No  Recurrent: No   Dream enactment: No      NARCOLEPSY:  Cataplexy: No   Sleep paralysis: No   Sleep attacks: No   Hypnagogic/hypnopompic hallucinations: No     MEDICAL HISTORY  Past Medical History:   Diagnosis Date    Acute mastitis of left breast 2018    Apnea, sleep     Blood clotting disorder (HCC)     hx. left arm post hospitalization    Daytime sleepiness     Insomnia     Morning headache     Snoring     no cpap        SURGICAL HISTORY  Past Surgical History:   Procedure Laterality Date    SKIN ABSCESS INCISION AND DRAINAGE Left 2018    Procedure: SKIN ABSCESS INCISION AND DRAINAGE- BREAST;  Surgeon: Veronica Zuniga M.D.;  Location: SURGERY SAME DAY [facility];  Service: General    GYN SURGERY          PRIMARY C SECTION          FAMILY HISTORY  Family History   Problem Relation Age of Onset    Hypertension Mother     Sleep Apnea Father     Arthritis Father        SOCIAL HISTORY  Social History     Socioeconomic History    Marital status:    Tobacco Use    Smoking status: Never    Smokeless tobacco: Never   Vaping Use    Vaping Use: Never used   Substance and Sexual Activity    Alcohol use: No    Drug use: No    Sexual activity: Not Currently   Social History Narrative    ** Merged History Encounter **         ** Merged History Encounter **             Occupation: [employer]  9am     CURRENT MEDICATIONS  Current Outpatient Medications   Medication Sig Dispense Refill    fluticasone (FLONASE) 50 MCG/ACT nasal spray ADMINISTER 1 SPRAY INTO AFFECTED NOSTRIL(S) EVERY DAY. 48 mL 1    cetirizine (ZYRTEC) 10 MG Tab TAKE 1 TABLET BY MOUTH EVERY DAY 90 Tablet 1    multivitamin Tab Take 1 Tablet by mouth every day.      ibuprofen (MOTRIN) 200 MG Tab Take " "800 mg by mouth every 6 hours as needed for Mild Pain.       No current facility-administered medications for this visit.       REVIEW OF SYSTEMS  Constitutional: Denies fevers, Denies weight changes  Ears/Nose/Throat/Mouth: Denies nasal congestion or sore throat   Cardiovascular: Denies chest pain  Respiratory: Denies shortness of breath, Denies cough  Gastrointestinal/Hepatic: Denies nausea, vomiting  Sleep: see HPI    Physical Examination:  Vitals/ General Appearance:   Weight/BMI: Body mass index is 24.21 kg/m².  /78 (BP Location: Left arm, Patient Position: Sitting, BP Cuff Size: Adult)   Pulse 72   Resp 16   Ht 1.676 m (5' 6\")   Wt 68 kg (150 lb)   SpO2 98%   Vitals:    03/20/24 1403   BP: 122/78   BP Location: Left arm   Patient Position: Sitting   BP Cuff Size: Adult   Pulse: 72   Resp: 16   SpO2: 98%   Weight: 68 kg (150 lb)   Height: 1.676 m (5' 6\")       Pt. is alert and oriented to time, place and person. Cooperative and in no apparent distress.     Constitutional: Alert, no distress, well-groomed.  Skin: No rashes in visible areas.  Eye: Round. Conjunctiva clear, lids normal. No icterus.   ENT EXAM  Nasal alae/valves collapsible: No   Nasal septum deviation: Yes  Nasal turbinate hypertrophy: Left: Grade 1   Right: Grade 1  Hard palate narrow: No   Hard palate high: No   Soft palate/uvula (Mallampati score): 2  Tongue Scalloping: No   Retrognathia: No   Micrognathia: No   Cardiovascular:no murmus/gallops/rubs, normal S1 and S2 heart sounds, regular rate and rhythm  Pulmonary:Clear to auscultation, No wheezes, No crackles.  Neurologic:Awake, alert and oriented x 3, Normal age appropriate gait, No involuntary motions.  Extremities: No clubbing, cyanosis, or edema       ASSESSMENT AND PLAN   Sofia Viramontes is a 48 y.o. female with allergic rhinitis, snoring.  Presents to Sleep Clinic for evaluation of sleep.     1. Sofia Viramontes  has symptoms of Obstructive Sleep Apnea (JESSIKA). " Sofia Viramontes has symptoms of snoring, choking/gasping during sleep, witnessed apnea, morning headaches, unrefreshed upon awakening, excessive daytime sleepiness. These can interfere with activities of daily living.   ESS 15  Pt has risk factors for JESSIKA include family history of father, age and crowded oropharynx.     The pathophysiology of JESSIKA and the increased risk of cardiovascular morbidity from untreated JESSIKA is discussed in detail with the patient.       We have discussed diagnostic options including in-laboratory, attended polysomnography and home sleep testing. We have also discussed treatment options including airway pressurization, reconstructive otolaryngologic surgery, dental appliances and weight management.     Subsequently,treatment options will be discussed based on the diagnostic study. Meanwhile, She is urged to avoid supine sleep, weight gain and alcoholic beverages since all of these can worsen JESSIKA. She is cautioned against drowsy driving. If She feels sleepy while driving, advised must pull over for a break/nap, rather than persist on the road, in the interest of Pt's own safety and that of others on the road.    Plan  -  She  will be scheduled for an overnight HST to assess sleep related breathing disorder.  - Follow up 1-2 weeks after sleep study to discuss results and treatment options moving forward   -Advised to reach out via MyChart or by phone with any questions or concerns.     2.  Regarding treatment of other past medical problems and general health maintenance,  Pt is urged to follow up with PCP.      Please note portions of this record was created using voice recognition software. I have made every reasonable attempt to correct obvious errors, but I expect that there are errors of grammar and possibly content I did not discover before finalizing the note.

## 2024-04-26 ENCOUNTER — HOME STUDY (OUTPATIENT)
Dept: SLEEP MEDICINE | Facility: MEDICAL CENTER | Age: 49
End: 2024-04-26
Attending: STUDENT IN AN ORGANIZED HEALTH CARE EDUCATION/TRAINING PROGRAM
Payer: COMMERCIAL

## 2024-04-26 DIAGNOSIS — R06.89 GASPING FOR BREATH: ICD-10-CM

## 2024-04-26 DIAGNOSIS — R06.81 WITNESSED APNEIC SPELLS: ICD-10-CM

## 2024-04-26 DIAGNOSIS — R06.83 SNORING: ICD-10-CM

## 2024-04-26 DIAGNOSIS — G47.19 EXCESSIVE DAYTIME SLEEPINESS: ICD-10-CM

## 2024-04-26 DIAGNOSIS — G47.30 SLEEP DISORDER BREATHING: ICD-10-CM

## 2024-04-26 PROCEDURE — 95800 SLP STDY UNATTENDED: CPT | Performed by: STUDENT IN AN ORGANIZED HEALTH CARE EDUCATION/TRAINING PROGRAM

## 2024-04-30 NOTE — PROCEDURES
DIAGNOSTIC HOME SLEEP TEST (HST) REPORT WatchPAT      PATIENT ID:  NAME:  Sofia Viramontes  MRN:               [id number]  YOB: 1975  DATE OF STUDY: 4/26/2024      Impression:     This study shows evidence of:      1. Mild obstructive sleep apnea with PAT apnea hypopnea index(pAHI) of 6.8 per hour.  PAT respiratory disturbance index (pRDI) was 8.1 per hour. These findings are based on 7 channels recording of PAT signal with sleep staging, heart rate, pulse oximetry, actigraphy, body position, snoring and respiratory movement.     2. Oxygenation O2 Sat. mean O2 sat was 94%,  latisha was 87%,  and maximum O2 at 98 %. O2 sat was at or  below 88% for 0.2 min of evaluation time. Oxygen Desaturation (>=4%) Index was 1.4/hr. AVG HR was 69 BPM.      TECHNICAL DESCRIPTION: Patient underwent home sleep apnea testing with peripheral arterial tone signal (WatchPAT™). This is a Type IV portable monitor and device per Medicare. Monitoring was done with 7 channels recording of PAT signal with sleep staging, heart rate, pulse oximetry, actigraphy, body position, snoring and respiratory movement. Prior to using the device, the patient received verbal and written instructions for its application and was provided with the help desk phone number for additional telephonic instruction with 24-hour availability of qualified personnel to answer questions.    Respiratory events:      General sleep summary: . Total recording time is 8 hours and 33 minutes and total Sleep time is 8 hours and 13 minutes. The patient spent 289.5 minutes in the supine position and 203.5 minutes in the nonsupine position.      Recommendations:    1. CPAP titration study vs Auto CPAP trial.   2. In general patients with sleep apnea are advised to avoid alcohol and sedatives and to not operate a motor vehicle while drowsy. In some cases alternative treatment options may prove effective in resolving sleep apnea in these options include  upper airway surgery, the use of a dental orthotic or weight loss and positional therapy. Clinical correlation is required.         True Lynne MD

## 2024-05-14 ENCOUNTER — OFFICE VISIT (OUTPATIENT)
Dept: SLEEP MEDICINE | Facility: MEDICAL CENTER | Age: 49
End: 2024-05-14
Attending: STUDENT IN AN ORGANIZED HEALTH CARE EDUCATION/TRAINING PROGRAM
Payer: COMMERCIAL

## 2024-05-14 VITALS
HEART RATE: 78 BPM | OXYGEN SATURATION: 99 % | HEIGHT: 63 IN | DIASTOLIC BLOOD PRESSURE: 68 MMHG | SYSTOLIC BLOOD PRESSURE: 110 MMHG | WEIGHT: 147.4 LBS | BODY MASS INDEX: 26.12 KG/M2 | RESPIRATION RATE: 16 BRPM

## 2024-05-14 DIAGNOSIS — G47.19 EXCESSIVE DAYTIME SLEEPINESS: ICD-10-CM

## 2024-05-14 DIAGNOSIS — G47.33 OSA (OBSTRUCTIVE SLEEP APNEA): Primary | ICD-10-CM

## 2024-05-14 PROCEDURE — 3078F DIAST BP <80 MM HG: CPT | Performed by: STUDENT IN AN ORGANIZED HEALTH CARE EDUCATION/TRAINING PROGRAM

## 2024-05-14 PROCEDURE — 99213 OFFICE O/P EST LOW 20 MIN: CPT | Performed by: STUDENT IN AN ORGANIZED HEALTH CARE EDUCATION/TRAINING PROGRAM

## 2024-05-14 PROCEDURE — 3074F SYST BP LT 130 MM HG: CPT | Performed by: STUDENT IN AN ORGANIZED HEALTH CARE EDUCATION/TRAINING PROGRAM

## 2024-05-14 ASSESSMENT — FIBROSIS 4 INDEX: FIB4 SCORE: 0.94

## 2024-05-14 NOTE — PROGRESS NOTES
[facility] Follow-up Visit    CC: Follow-up to discuss sleep study results      HPI:  Sofia Viramontes is a 48 y.o.female  with allergic rhinitis, snoring and excessive daytime sleepiness.  Presents today to follow-up regarding recent sleep study results.    She states neither sleep study went well.  She was told by her bed partner that she did not snore as much as she normally does during the night.  She does have episodes of pausing in her breathing and gasping at times.  At last visit it was discussed that she does have daytime sleepiness with an [place] Sleepiness Scale was elevated at 15.      Sleep History  2024 HST WatchPAT study showed mild obstructive sleep apnea with an overall AHI of 6.8, RDI of 8.1, minimum oxygen saturation 87%, time at or below 88% saturation 0.2 minutes    Patient Active Problem List    Diagnosis Date Noted    Hemorrhoids 2023    Piriformis syndrome of right side 2022    Encounter for general adult medical examination without abnormal findings 04/15/2020    Hyperlipidemia 04/15/2020    Family history of hypercholesterolemia 2019    Other headache syndrome 2018    Normocytic anemia 2018    Thrombocytosis 2018       Past Medical History:   Diagnosis Date    Acute mastitis of left breast 2018    Apnea, sleep     Blood clotting disorder (HCC)     hx. left arm post hospitalization    Daytime sleepiness     Insomnia     Morning headache     Snoring     no cpap        Past Surgical History:   Procedure Laterality Date    SKIN ABSCESS INCISION AND DRAINAGE Left 2018    Procedure: SKIN ABSCESS INCISION AND DRAINAGE- BREAST;  Surgeon: Veronica Zuniga M.D.;  Location: SURGERY SAME DAY [facility];  Service: General    GYN SURGERY          PRIMARY C SECTION         Family History   Problem Relation Age of Onset    Hypertension Mother     Sleep Apnea Father     Arthritis Father        Social History     Socioeconomic  History    Marital status:      Spouse name: Not on file    Number of children: Not on file    Years of education: Not on file    Highest education level: Not on file   Occupational History    Not on file   Tobacco Use    Smoking status: Never    Smokeless tobacco: Never   Vaping Use    Vaping Use: Never used   Substance and Sexual Activity    Alcohol use: No    Drug use: No    Sexual activity: Not Currently   Other Topics Concern    Not on file   Social History Narrative    ** Merged History Encounter **         ** Merged History Encounter **          Social Determinants of Health     Financial Resource Strain: Not on file   Food Insecurity: Not on file   Transportation Needs: Not on file   Physical Activity: Not on file   Stress: Not on file   Social Connections: Not on file   Intimate Partner Violence: Not on file   Housing Stability: Not on file       Current Outpatient Medications   Medication Sig Dispense Refill    fluticasone (FLONASE) 50 MCG/ACT nasal spray ADMINISTER 1 SPRAY INTO AFFECTED NOSTRIL(S) EVERY DAY. 48 mL 1    cetirizine (ZYRTEC) 10 MG Tab TAKE 1 TABLET BY MOUTH EVERY DAY 90 Tablet 1    multivitamin Tab Take 1 Tablet by mouth every day.      ibuprofen (MOTRIN) 200 MG Tab Take 800 mg by mouth every 6 hours as needed for Mild Pain.       No current facility-administered medications for this visit.        ALLERGIES: Amoxicillin, Clindamycin, Penicillins, Septra [sulfamethoxazole w-trimethoprim], Sulfa drugs, Amoxicillin, Clindamycin, Food, Lactose, [place] flavor, Pcn [penicillins], Penicillins, Septra [sulfamethoxazole w-trimethoprim], and Sulfa drugs    ROS  Constitutional: Denies fevers, Denies weight changes  Ears/Nose/Throat/Mouth: Denies nasal congestion or sore throat   Cardiovascular: Denies chest pain  Respiratory: Denies shortness of breath, Denies cough  Gastrointestinal/Hepatic: Denies nausea, vomiting  Sleep: see HPI      PHYSICAL EXAM  /68 (BP Location: Left arm, Patient  "Position: Sitting, BP Cuff Size: Adult)   Pulse 78   Resp 16   Ht 1.6 m (5' 3\")   Wt 66.9 kg (147 lb 6.4 oz)   SpO2 99%   BMI 26.11 kg/m²   Appearance: Well-nourished, well-developed, no acute distress  Eyes:  No scleral icterus , EOMI  Musculoskeletal:  Grossly normal; gait and station normal; digits and nails normal  Skin:  No rashes, petechiae, cyanosis  Neurologic: without focal signs; oriented to person, time, place, and purpose; judgement intact      Medical Decision Making   Assessment and Plan  [facility] Bettye Viramontes is a 48 y.o.female  with allergic rhinitis, snoring and excessive daytime sleepiness.  Presents today to follow-up regarding recent sleep study results.    The medical record was reviewed.    Obstructive sleep apnea   Reviewed recent HST with patient showing an AHI of 6.8, RDI 8.1  and Min Oxygen saturation of 87%.  Time at or below 88% saturation 0.2minutes  Based on sleep study and symptoms meets criteria for Mild obstructive sleep apnea.   We discussed the pathophysiology of obstructive sleep apnea (JESSIKA) and risk factors for the disease. We also discussed possible consequences of untreated JESSIKA, including excessive daytime sleepiness and fatigue, cognitive dysfunction, cardiovascular complications such as elevated blood pressure, . We discussed how JESSIKA typically gets worse with age. We discussed treatment options for JESSIKA, including the gold standard therapy (PAP), alternative options such as a mandibular advancement device (custom-made oral appliances) and surgeries. We will proceed CPAP therapy.     RECOMMENDATIONS  -Start Auto CPAP at pressures 4-8 cm H2O  -Discussed importance of adherence/compliance   -Prescription generated for supplies   -Patient counseled to avoid driving when sleepy. Encouraged to anticipate sleepiness, consider taking a 10 min nap prior to driving, alternate with another , or pull over if sleepy while driving  -Advised to contact our office or myself " with any questions via "[employer]"  -Follow up in 3 months or sooner if needed      Return in about 3 months (around 8/14/2024).      Please note portions of this record was created using voice recognition software. I have made every reasonable attempt to correct obvious errors, but I expect that there are errors of grammar and possibly content I did not discover before finalizing the note.

## 2024-06-06 ENCOUNTER — OFFICE VISIT (OUTPATIENT)
Dept: INTERNAL MEDICINE | Facility: OTHER | Age: 49
End: 2024-06-06
Payer: COMMERCIAL

## 2024-06-06 VITALS
OXYGEN SATURATION: 98 % | SYSTOLIC BLOOD PRESSURE: 111 MMHG | HEIGHT: 64 IN | TEMPERATURE: 97.6 F | HEART RATE: 76 BPM | DIASTOLIC BLOOD PRESSURE: 75 MMHG | BODY MASS INDEX: 25.85 KG/M2 | WEIGHT: 151.4 LBS

## 2024-06-06 DIAGNOSIS — E78.1 HYPERTRIGLYCERIDEMIA: ICD-10-CM

## 2024-06-06 DIAGNOSIS — Z00.00 ENCOUNTER FOR ANNUAL PHYSICAL EXAM: ICD-10-CM

## 2024-06-06 PROCEDURE — 1126F AMNT PAIN NOTED NONE PRSNT: CPT | Performed by: INTERNAL MEDICINE

## 2024-06-06 PROCEDURE — 3078F DIAST BP <80 MM HG: CPT | Performed by: INTERNAL MEDICINE

## 2024-06-06 PROCEDURE — 99396 PREV VISIT EST AGE 40-64: CPT | Performed by: INTERNAL MEDICINE

## 2024-06-06 PROCEDURE — 3074F SYST BP LT 130 MM HG: CPT | Performed by: INTERNAL MEDICINE

## 2024-06-06 RX ORDER — NICOTINE 14MG/24HR
PATCH, TRANSDERMAL 24 HOURS TRANSDERMAL
COMMUNITY

## 2024-06-06 ASSESSMENT — FIBROSIS 4 INDEX: FIB4 SCORE: 0.94

## 2024-06-06 ASSESSMENT — PAIN SCALES - GENERAL: PAINLEVEL: NO PAIN

## 2024-06-06 ASSESSMENT — ENCOUNTER SYMPTOMS: GENERAL WELL-BEING: EXCELLENT

## 2024-06-06 ASSESSMENT — ACTIVITIES OF DAILY LIVING (ADL): BATHING_REQUIRES_ASSISTANCE: 0

## 2024-06-06 ASSESSMENT — PATIENT HEALTH QUESTIONNAIRE - PHQ9: CLINICAL INTERPRETATION OF PHQ2 SCORE: 0

## 2024-06-06 NOTE — PROGRESS NOTES
"Subjective     Sofia Viramontes is a 46 y.o. 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                    ROS               Objective     /75 (BP Location: Right arm, Patient Position: Sitting, BP Cuff Size: Adult long)   Pulse 76   Temp 36.4 °C (97.6 °F) (Temporal)   Ht 1.613 m (5' 3.5\")   Wt 68.7 kg (151 lb 6.4 oz)   LMP 05/16/2024 (Approximate)   SpO2 98%   Breastfeeding No   BMI 26.40 kg/m²      Physical Exam    General: Pleasant woman in no apparent distress, alert, responding appropriately.                       Assessment & Plan     Healthcare Maintenance    Colonoscopy: neg 2023, due 2033  HIV: neg 2022  Syphilis:neg 2022  Hep B:hep b SA neg 2022  Hep C:neg 2022 (outside records reviewed on patient's cell phone)  GC/chlam neg recently also, counseled on recheck only if new sexual contact with unknown status.  Offered to review safe sex practices if needed, patient reports no questions.  Diabetes: BG nl 2023  ASCVD: low hdl and mildly elevated tg 2018, repeat lipids, dietary practices discussed in detail as below  Smoking: never  Vaping: no  Alcohol Use: no  Recreational Drugs: no  Healthy Eating/Exercise: Extensively discussed current evidence-based dietary practices, reviewed vegetarian inclined diet recommendations, discussed glycemic index, healthy eating handout given.  Also answered some questions about dairy consumption based on recent research.  Discussed that in general if " eating a balanced diet no supplements needed.  Depression and Anxiety: phq2 neg, situational stressors (moving, death of friend, buying new home) but states is coping okay, no SI/HI.  Did share anxiety resources handout with patient as patient was interested, offered to discuss further at any time.  Immunizations: utd    Mammogram: neg 2023, repeat pending per GYN  PAP: prior abnl but 5/2024 pap neg with neg hpv, followed by gyn, appreciate support, defer further management to GYN  DEXA: not yet  ADLs: no issue  Fall Risk: no issue  Goals of Care: full code, POLST form given.  Patient states that she is planning to meet with an  to review further..      Mild JESSIKA  -sx c/w jessika  -home study with AHI 6.8, thus likely mild JESSIKA.  Patient reports started CPAP last night per sleep medicine.  -defer to sleep med for further management, appreciate support    ? Prior thyromegaly  -no thyroid symptoms, tsh nl 7/2023.  ? Prior L mild thyroid prominence on previous exam.  We did not have a chance to palpate her thyroid today given time was spend reviewing preventave topics and discussing diet - patient will return to clinic at her convenince for brief check in for thyroid re-palpation, if normal will follow clinically.    Addendum: patient returned for thyroid exam after the visit, no thyromegaly on repeat exam, continues with no thyroid symptoms, no further intervention needed, will follow clinically.    From Prior Visit, Not Addressed Today:    Possible Tight hip musculature causing intermittent pain  -no alarm sx. no focal issues on exam  -suspect  mild hip muscle tightness which may be residually contributing to her symptoms.  Hips appear to be in a neutral position.  Significantly improved from prior visit with resolution of piriformis syndrome, R psoas now normalized.    Plan  -Offered to repeat physical therapy with slightly different therapeutic approach for current symptoms, patient prefers to try home physical  therapy.  Handout given for possible exercises that can help with hip mobilization.  Also gave recommendations for possible more intensive mobilization via Dr. Cedillo mobility book, to be used with caution  -patient reports going to a chiropractor, discussed mentioning possibility of discussion with him to see if can assist with hip mobility  -emphasized the importance of neutral alignment, including topics such as using both straps on a backpack instead of just one  -patient will message to update on progress - if still no improvement with above could consider formal sports medicine/OMM referral.    B/l axillary swelling, possibly secondary to viral infection, improving  -resolved    seasonal allergies s/p immunotherapy as a child  -not directly addressed today, but patient reported doing well on Flonase during recent refill request.  Can follow clinically at future visit.      History of anemia, history of thrombocytosis  -Had a normocytic anemia and thrombocytosis many years ago on the labs that appears was not repeated, perhaps around the time of her mastitis biopsy.  We will repeat CBC to trend.  Asymptomatic today.  7/1 - cbc with diff nl, monitor clinically.    calvin allergy - mild, reports is able to tolerate small amounts without any symptoms at all, never had anaphylaxis.  Discussed possibility of carrying epipen, patient politely declines at present given mild nature of symptoms.  States has not had more significant symptoms for 20 years.  Will plan to evaluate more fully at future visit, cautioned any sob/throat swelling  or severe lightheadendes after eating to go to ED for evaluation.    Discussion on vitamins/supplements  -Patient reports taking cranberry pills, Azo supplements for urinary symptoms/prevention, and also questions the utility of a multivitamin.  Discussed that if she is eating a balanced diet there is no need for a multivitamin per current literature.  Discussed that Azo pills simply  mask symptoms of urinary tract infection and do not treat or prevent them, and encouraged her to discontinue use.  Discussed that my understanding is that cranberry juice has not been shown to have a significant intervention in terms of reducing UTIs, and that hydration itself is more effective, although I will need to review this to see if any new or literature exists.      Return to clinic in  Annual exam due 6/2025, palpate thyroid due to ? Thyromegaly on prior exam (no symptoms offered), offered to see patient sooner if any issues arise.  Patient reports no other issues to discuss.

## 2024-06-14 ENCOUNTER — TELEPHONE (OUTPATIENT)
Dept: SLEEP MEDICINE | Facility: MEDICAL CENTER | Age: 49
End: 2024-06-14
Payer: COMMERCIAL

## 2024-06-14 NOTE — TELEPHONE ENCOUNTER
Patient called and LVM and was told if her press was over 5 to call and its at 7.8 and if her central A.I was over zero which it was at .9

## 2024-07-31 ENCOUNTER — HOSPITAL ENCOUNTER (OUTPATIENT)
Dept: LAB | Facility: MEDICAL CENTER | Age: 49
End: 2024-07-31
Attending: INTERNAL MEDICINE
Payer: COMMERCIAL

## 2024-07-31 DIAGNOSIS — E78.1 HYPERTRIGLYCERIDEMIA: ICD-10-CM

## 2024-07-31 LAB
CHOLEST SERPL-MCNC: 181 MG/DL (ref 100–199)
FASTING STATUS PATIENT QL REPORTED: NORMAL
HDLC SERPL-MCNC: 68 MG/DL
LDLC SERPL CALC-MCNC: 102 MG/DL
TRIGL SERPL-MCNC: 54 MG/DL (ref 0–149)

## 2024-07-31 PROCEDURE — 80061 LIPID PANEL: CPT

## 2024-07-31 PROCEDURE — 36415 COLL VENOUS BLD VENIPUNCTURE: CPT

## 2024-08-07 ENCOUNTER — HOSPITAL ENCOUNTER (OUTPATIENT)
Dept: RADIOLOGY | Facility: MEDICAL CENTER | Age: 49
End: 2024-08-07
Attending: OBSTETRICS & GYNECOLOGY
Payer: COMMERCIAL

## 2024-08-07 DIAGNOSIS — Z12.31 ENCOUNTER FOR MAMMOGRAM TO ESTABLISH BASELINE MAMMOGRAM: ICD-10-CM

## 2024-08-07 DIAGNOSIS — R92.2 INCONCLUSIVE MAMMOGRAPHY DUE TO DENSE BREASTS: ICD-10-CM

## 2024-08-07 DIAGNOSIS — R92.30 INCONCLUSIVE MAMMOGRAPHY DUE TO DENSE BREASTS: ICD-10-CM

## 2024-08-07 PROCEDURE — 77067 SCR MAMMO BI INCL CAD: CPT

## 2024-08-07 PROCEDURE — 76641 ULTRASOUND BREAST COMPLETE: CPT

## 2024-08-08 ENCOUNTER — TELEPHONE (OUTPATIENT)
Dept: INTERNAL MEDICINE | Facility: OTHER | Age: 49
End: 2024-08-08
Payer: COMMERCIAL

## 2024-08-08 NOTE — TELEPHONE ENCOUNTER
----- Message from Karolyn Stewart, Med Ass't sent at 8/8/2024 11:51 AM PDT -----    ----- Message -----  From: Lakisha Aguilar M.D.  Sent: 8/8/2024  11:50 AM PDT  To: Unr Marilyn Mantilla [facility] breast is normal . Pls let pt know

## 2024-08-08 NOTE — TELEPHONE ENCOUNTER
Phone Number Called: There are no phone numbers on file.     Call outcome: Left detailed message for patient. Informed to call back with any additional questions.    Message: I called patient lvm.

## 2024-08-29 ENCOUNTER — OFFICE VISIT (OUTPATIENT)
Dept: SLEEP MEDICINE | Facility: MEDICAL CENTER | Age: 49
End: 2024-08-29
Attending: STUDENT IN AN ORGANIZED HEALTH CARE EDUCATION/TRAINING PROGRAM
Payer: COMMERCIAL

## 2024-08-29 VITALS
DIASTOLIC BLOOD PRESSURE: 72 MMHG | RESPIRATION RATE: 16 BRPM | WEIGHT: 148 LBS | OXYGEN SATURATION: 97 % | BODY MASS INDEX: 26.22 KG/M2 | SYSTOLIC BLOOD PRESSURE: 110 MMHG | HEIGHT: 63 IN | HEART RATE: 78 BPM

## 2024-08-29 DIAGNOSIS — G47.33 OSA (OBSTRUCTIVE SLEEP APNEA): Primary | ICD-10-CM

## 2024-08-29 PROCEDURE — 99212 OFFICE O/P EST SF 10 MIN: CPT | Performed by: STUDENT IN AN ORGANIZED HEALTH CARE EDUCATION/TRAINING PROGRAM

## 2024-08-29 ASSESSMENT — FIBROSIS 4 INDEX: FIB4 SCORE: 0.94

## 2024-08-29 NOTE — PROGRESS NOTES
[facility] Follow-up Visit    CC: Follow-up for first compliance visit after receiving CPAP machine      HPI:  Sofia Viramontes is a 48 y.o.female  with allergic rhinitis, snoring, daytime tiredness and mild obstructive sleep apnea on CPAP.  Presents today to follow-up regarding management of obstructive sleep apnea after receiving her CPAP machine.    Since last visit she has received her machine.  She is using her machine regularly.  She has had 8 approximately 3 months.  She is using a traditional style fullface mask.  She feels that the facemask will leak at times and blow into her eyes or disrupt her sleep due to lack of seal.  She does not believe she breathes through her mouth at night very much.  She mainly breathes through her nose.    She states with use of the device she does not snore.  This has helped her bed partner.  She states it is difficult to know if it has helped her during the day given that the device will sometimes wake her up due to leak.    She has experienced some bloating at night.  She states this has happened a few nights since she has been on the machine.  Does not happen every night.      DME provider: [employer]  Device: Airsense 11  Mask: Fullface  Aerophagia: Yes at times  Snoring: No   Dry mouth: No   Leak: Yes at times   Skin irritation: slight   Chin strap: No       Sleep History  4/26/2024 HST WatchPAT study showed mild obstructive sleep apnea with an overall AHI of 6.8, RDI of 8.1, minimum oxygen saturation 87%, time at or below 88% saturation 0.2 minutes     Patient Active Problem List    Diagnosis Date Noted    Hemorrhoids 06/26/2023    Piriformis syndrome of right side 05/19/2022    Encounter for general adult medical examination without abnormal findings 04/15/2020    Hyperlipidemia 04/15/2020    Family history of hypercholesterolemia 09/03/2019    Other headache syndrome 12/12/2018    Normocytic anemia 11/02/2018    Thrombocytosis 11/02/2018       Past  Medical History:   Diagnosis Date    Acute mastitis of left breast 2018    Apnea, sleep     Blood clotting disorder (HCC)     hx. left arm post hospitalization    Daytime sleepiness     Insomnia     Morning headache     Snoring     no cpap        Past Surgical History:   Procedure Laterality Date    SKIN ABSCESS INCISION AND DRAINAGE Left 2018    Procedure: SKIN ABSCESS INCISION AND DRAINAGE- BREAST;  Surgeon: Veronica Zuniga M.D.;  Location: SURGERY SAME DAY [facility];  Service: General    GYN SURGERY          PRIMARY C SECTION         Family History   Problem Relation Age of Onset    Hypertension Mother     Sleep Apnea Father     Arthritis Father        Social History     Socioeconomic History    Marital status:      Spouse name: Not on file    Number of children: Not on file    Years of education: Not on file    Highest education level: Not on file   Occupational History    Not on file   Tobacco Use    Smoking status: Never    Smokeless tobacco: Never   Vaping Use    Vaping status: Never Used   Substance and Sexual Activity    Alcohol use: No    Drug use: No    Sexual activity: Not Currently   Other Topics Concern    Not on file   Social History Narrative    ** Merged History Encounter **         ** Merged History Encounter **          Social Determinants of Health     Financial Resource Strain: Not on file   Food Insecurity: Not on file   Transportation Needs: Not on file   Physical Activity: Not on file   Stress: Not on file   Social Connections: Not on file   Intimate Partner Violence: Not on file   Housing Stability: Not on file       Current Outpatient Medications   Medication Sig Dispense Refill    Saccharomyces boulardii (PROBIOTIC) 250 MG Cap Take  by mouth.      fluticasone (FLONASE) 50 MCG/ACT nasal spray ADMINISTER 1 SPRAY INTO AFFECTED NOSTRIL(S) EVERY DAY. 48 mL 1    cetirizine (ZYRTEC) 10 MG Tab TAKE 1 TABLET BY MOUTH EVERY DAY 90 Tablet 1    multivitamin Tab Take 1  "Tablet by mouth every day.      ibuprofen (MOTRIN) 200 MG Tab Take 800 mg by mouth every 6 hours as needed for Mild Pain.       No current facility-administered medications for this visit.        ALLERGIES: Amoxicillin, Clindamycin, Penicillins, Septra [sulfamethoxazole w-trimethoprim], Sulfa drugs, Amoxicillin, Clindamycin, Food, Lactose, [place] flavor, Pcn [penicillins], Penicillins, Septra [sulfamethoxazole w-trimethoprim], and Sulfa drugs    ROS  Constitutional: Denies fevers, Denies weight changes  Ears/Nose/Throat/Mouth: Denies nasal congestion or sore throat   Cardiovascular: Denies chest pain  Respiratory: Denies shortness of breath, Denies cough  Gastrointestinal/Hepatic: Denies nausea, vomiting  Sleep: see HPI      PHYSICAL EXAM  /72 (BP Location: Left arm, Patient Position: Sitting, BP Cuff Size: Adult)   Pulse 78   Resp 16   Ht 1.6 m (5' 3\")   Wt 67.1 kg (148 lb)   SpO2 97%   BMI 26.22 kg/m²   Appearance: Well-nourished, well-developed, no acute distress  Eyes:  No scleral icterus , EOMI  Musculoskeletal:  Grossly normal; gait and station normal; digits and nails normal  Skin:  No rashes, petechiae, cyanosis  Neurologic: without focal signs; oriented to person, time, place, and purpose; judgement intact      Medical Decision Making   Assessment and Plan  Sofia Viramontes is a 48 y.o.female  with allergic rhinitis, snoring, daytime tiredness and mild obstructive sleep apnea on CPAP.  Presents today to follow-up regarding management of obstructive sleep apnea after receiving her CPAP machine.    The medical record was reviewed.    Obstructive sleep apnea  Compliance data reviewed showing 87% usage > 4hours in last 30  days. Average AHI 0.6 events/hour. Pt continues to use and benefit from machine.      Current Settings 4-8    Discussed mask.  Suspect patient will do well with a nasal mask.  Whether it be a traditional nasal mask, nasal cushion or nasal pillow would be up to the patient. "  Suspect patient does not require a fullface mask.  Given occasional bloating sensation we will lower maximum pressure from 8 to 7    PLAN:   -Order placed for mask and supplies   -Order placed for mask fitting nasal pillow  -Maximum pressure decreased at today's visit to 7 cmH2O and EPR increased to 3  -Advised to reach out via MyChart with questions     Has been advised to continue the current CPAP, clean equipment frequently, and get new mask and supplies as allowed by insurance and DME. Recommend an earlier appointment, if significant treatment barriers develop.    Patients with JESSIKA are at increased risk of cardiovascular disease including coronary artery disease, systemic arterial hypertension, pulmonary arterial hypertension, cardiac arrythmias, and stroke. The patient was advised to avoid driving a motor vehicle when drowsy.    Positive airway pressure will favorably impact many of the adverse conditions and effects provoked by JESSIKA.    Have advised the patient to follow up with the appropriate healthcare practitioners for all other medical problems and issues.    Return in about 3 months (around 11/29/2024).      Please note portions of this record was created using voice recognition software. I have made every reasonable attempt to correct obvious errors, but I expect that there are errors of grammar and possibly content I did not discover before finalizing the note.

## 2024-12-09 ENCOUNTER — OFFICE VISIT (OUTPATIENT)
Dept: SLEEP MEDICINE | Facility: MEDICAL CENTER | Age: 49
End: 2024-12-09
Attending: STUDENT IN AN ORGANIZED HEALTH CARE EDUCATION/TRAINING PROGRAM
Payer: COMMERCIAL

## 2024-12-09 VITALS
WEIGHT: 143 LBS | SYSTOLIC BLOOD PRESSURE: 110 MMHG | OXYGEN SATURATION: 98 % | BODY MASS INDEX: 25.34 KG/M2 | RESPIRATION RATE: 16 BRPM | HEART RATE: 70 BPM | HEIGHT: 63 IN | DIASTOLIC BLOOD PRESSURE: 72 MMHG

## 2024-12-09 DIAGNOSIS — G47.33 OSA (OBSTRUCTIVE SLEEP APNEA): ICD-10-CM

## 2024-12-09 PROCEDURE — 99211 OFF/OP EST MAY X REQ PHY/QHP: CPT | Performed by: STUDENT IN AN ORGANIZED HEALTH CARE EDUCATION/TRAINING PROGRAM

## 2024-12-09 PROCEDURE — 3074F SYST BP LT 130 MM HG: CPT | Performed by: STUDENT IN AN ORGANIZED HEALTH CARE EDUCATION/TRAINING PROGRAM

## 2024-12-09 PROCEDURE — 3078F DIAST BP <80 MM HG: CPT | Performed by: STUDENT IN AN ORGANIZED HEALTH CARE EDUCATION/TRAINING PROGRAM

## 2024-12-09 PROCEDURE — 99213 OFFICE O/P EST LOW 20 MIN: CPT | Performed by: STUDENT IN AN ORGANIZED HEALTH CARE EDUCATION/TRAINING PROGRAM

## 2024-12-09 ASSESSMENT — FIBROSIS 4 INDEX: FIB4 SCORE: 0.96

## 2024-12-09 NOTE — PROGRESS NOTES
[facility] Follow-up Visit    CC: Follow-up regarding management of obstructive sleep apnea      HPI:  Sofia Viramontes is a 49 y.o.female  with ventriculitis, and mild obstructive sleep apnea on CPAP.  Presents today to follow-up regarding management of obstructive sleep apnea.    She continues to use her machine regularly.  She states with using her machine she does not snore.  Since last visit she did change to a nasal pillow top connecting mask which she prefers.  She does have some bloating but she states it is not bothersome in the morning.  Has no other acute complaints at this time.    She did have questions regarding nasal congestion and use of CPAP such as with a cold.      DME provider: [employer]  Device: Airsense 11  Mask: nasal pillow, heated  Aerophagia: decreased but not bothersome.   Snoring: No   Dry mouth: No   Leak: Yes at times   Skin irritation: slight   Chin strap: No         Sleep History  4/26/2024 HST WatchPAT study showed mild obstructive sleep apnea with an overall AHI of 6.8, RDI of 8.1, minimum oxygen saturation 87%, time at or below 88% saturation 0.2 minutes     Patient Active Problem List    Diagnosis Date Noted    Hemorrhoids 06/26/2023    Piriformis syndrome of right side 05/19/2022    Encounter for general adult medical examination without abnormal findings 04/15/2020    Hyperlipidemia 04/15/2020    Family history of hypercholesterolemia 09/03/2019    Other headache syndrome 12/12/2018    Normocytic anemia 11/02/2018    Thrombocytosis 11/02/2018       Past Medical History:   Diagnosis Date    Acute mastitis of left breast 11/02/2018    Apnea, sleep     Blood clotting disorder (HCC)     hx. left arm post hospitalization    Daytime sleepiness     Insomnia     Morning headache     Snoring     no cpap        Past Surgical History:   Procedure Laterality Date    SKIN ABSCESS INCISION AND DRAINAGE Left 12/20/2018    Procedure: SKIN ABSCESS INCISION AND DRAINAGE- BREAST;   Surgeon: Veronica Zuniga M.D.;  Location: SURGERY SAME DAY [facility];  Service: General    GYN SURGERY          PRIMARY C SECTION         Family History   Problem Relation Age of Onset    Hypertension Mother     Sleep Apnea Father     Arthritis Father        Social History     Socioeconomic History    Marital status:      Spouse name: Not on file    Number of children: Not on file    Years of education: Not on file    Highest education level: Not on file   Occupational History    Not on file   Tobacco Use    Smoking status: Never    Smokeless tobacco: Never   Vaping Use    Vaping status: Never Used   Substance and Sexual Activity    Alcohol use: No    Drug use: No    Sexual activity: Not Currently   Other Topics Concern    Not on file   Social History Narrative    ** Merged History Encounter **         ** Merged History Encounter **          Social Drivers of Health     Financial Resource Strain: Not on file   Food Insecurity: Not on file   Transportation Needs: Not on file   Physical Activity: Not on file   Stress: Not on file   Social Connections: Not on file   Intimate Partner Violence: Not on file   Housing Stability: Not on file       Current Outpatient Medications   Medication Sig Dispense Refill    Saccharomyces boulardii (PROBIOTIC) 250 MG Cap Take  by mouth.      fluticasone (FLONASE) 50 MCG/ACT nasal spray ADMINISTER 1 SPRAY INTO AFFECTED NOSTRIL(S) EVERY DAY. 48 mL 1    cetirizine (ZYRTEC) 10 MG Tab TAKE 1 TABLET BY MOUTH EVERY DAY 90 Tablet 1    multivitamin Tab Take 1 Tablet by mouth every day.      ibuprofen (MOTRIN) 200 MG Tab Take 800 mg by mouth every 6 hours as needed for Mild Pain.       No current facility-administered medications for this visit.        ALLERGIES: Amoxicillin, Clindamycin, Penicillins, Septra [sulfamethoxazole w-trimethoprim], Sulfa drugs, Amoxicillin, Clindamycin, Food, Lactose, [place] flavor, Pcn [penicillins], Penicillins, Septra [sulfamethoxazole  "w-trimethoprim], and Sulfa drugs    ROS  Constitutional: Denies fevers, Denies weight changes  Ears/Nose/Throat/Mouth: Denies nasal congestion or sore throat   Cardiovascular: Denies chest pain  Respiratory: Denies shortness of breath, Denies cough  Gastrointestinal/Hepatic: Denies nausea, vomiting  Sleep: see HPI      PHYSICAL EXAM  /72 (BP Location: Left arm, Patient Position: Sitting, BP Cuff Size: Adult)   Pulse 70   Resp 16   Ht 1.6 m (5' 3\")   Wt 64.9 kg (143 lb)   SpO2 98%   BMI 25.33 kg/m²   Appearance: Well-nourished, well-developed, no acute distress  Eyes:  No scleral icterus , EOMI  Musculoskeletal:  Grossly normal; gait and station normal; digits and nails normal  Skin:  No rashes, petechiae, cyanosis  Neurologic: without focal signs; oriented to person, time, place, and purpose; judgement intact      Medical Decision Making   Assessment and Plan  Sofia Viramontes is a 49 y.o.female  with ventriculitis, and mild obstructive sleep apnea on CPAP.  Presents today to follow-up regarding management of obstructive sleep apnea.    The medical record was reviewed.    Obstructive sleep apnea  Compliance data reviewed showing 83% usage > 4hours in last 30  days. Average AHI 0.6 events/hour. Pt continues to use and benefit from machine.      Current Settings auto CPAP 4-6    Discussed that when she has cold she may consider trying a fullface mask during that time.  Discussed potential use of Afrin to help with decongestion if she would like to use her nasal pillow mask.  Advised to not use Afrin more than 2 to 3 days.    PLAN:   -Order placed for mask and supplies   -Advised to reach out via MyChart with questions     Has been advised to continue the current CPAP, clean equipment frequently, and get new mask and supplies as allowed by insurance and DME. Recommend an earlier appointment, if significant treatment barriers develop.    Patients with JESSIKA are at increased risk of cardiovascular " disease including coronary artery disease, systemic arterial hypertension, pulmonary arterial hypertension, cardiac arrythmias, and stroke. The patient was advised to avoid driving a motor vehicle when drowsy.      Return in about 1 year (around 12/9/2025).      Please note portions of this record was created using voice recognition software. I have made every reasonable attempt to correct obvious errors, but I expect that there are errors of grammar and possibly content I did not discover before finalizing the note.

## 2025-05-30 ENCOUNTER — OFFICE VISIT (OUTPATIENT)
Dept: INTERNAL MEDICINE | Facility: OTHER | Age: 50
End: 2025-05-30
Payer: COMMERCIAL

## 2025-05-30 VITALS
SYSTOLIC BLOOD PRESSURE: 98 MMHG | DIASTOLIC BLOOD PRESSURE: 62 MMHG | WEIGHT: 136 LBS | OXYGEN SATURATION: 98 % | HEIGHT: 63 IN | TEMPERATURE: 97.4 F | BODY MASS INDEX: 24.1 KG/M2 | HEART RATE: 73 BPM

## 2025-05-30 DIAGNOSIS — R41.840 IMPAIRED CONCENTRATION: ICD-10-CM

## 2025-05-30 DIAGNOSIS — N64.4 BREAST PAIN: ICD-10-CM

## 2025-05-30 DIAGNOSIS — N63.10 MASS OF RIGHT BREAST, UNSPECIFIED QUADRANT: Primary | ICD-10-CM

## 2025-05-30 RX ORDER — LORATADINE 10 MG/1
10 TABLET ORAL DAILY
COMMUNITY

## 2025-05-30 ASSESSMENT — FIBROSIS 4 INDEX: FIB4 SCORE: 0.96

## 2025-05-30 ASSESSMENT — PAIN SCALES - GENERAL: PAINLEVEL_OUTOF10: NO PAIN

## 2025-05-30 ASSESSMENT — PATIENT HEALTH QUESTIONNAIRE - PHQ9: CLINICAL INTERPRETATION OF PHQ2 SCORE: 0

## 2025-05-30 NOTE — PROGRESS NOTES
"Subjective     Sofia Viramontes is a 46 y.o. 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 (no history anaphylaxis, states now tolerates small amounts of mangoes without symptoms), mild lactose intolerance, snoring with pending sleep study, Right-sided piriformis syndrome with likely concurrent right mild osteoarthritis/labral tear, overall tight musculature in the right hip,  here for question of attention focus issues and also new bilateral breast pain    Please see assessment and plan                  ROS               Objective     BP 98/62 (BP Location: Right arm, Patient Position: Sitting, BP Cuff Size: Adult)   Pulse 73   Temp 36.3 °C (97.4 °F) (Temporal)   Ht 1.6 m (5' 3\")   Wt 61.7 kg (136 lb)   LMP 05/15/2025 (Approximate)   SpO2 98%   Breastfeeding No   BMI 24.09 kg/m²      Physical Exam    General: Pleasant woman in no apparent distress, alert, responding appropriately.  Neuro:  AxOx4, 3/3 immediate and delayed recall  Genitourinary: Charisse MA chaperone present.  Patient has symmetric breasts, no retraction, no discharge, on palpation there is a tissue prominence/questionable mass on the medial aspect of the right nipple just outside of the areola, approximately 1 to 2 cm wide although extends towards the areola without a clear boundary point.  No other masses appreciated.  Left breast without masses.  No axillary lymphadenopathy bilaterally  Psych: Speech not pressured, no flight of ideas, no psychomotor slowing or agitation, dressed appropriately,                     Assessment & Plan     Concentration issues likely secondary to busy life and situational stressors  -sleep makes worse, has chagnes last 2-3 years.  No rec drug or alcohool.  Some caffeine, did not seem to notice significant change with this.  Phq2 neg, notes " multiple situational stressors including relatively new partner, childcare duties, potentially changes related to work., no si/hi.  Stress exacerbates the symptoms s. No lack of sleep, but some higher levels, mildly elevated during the daytime.  - Depression screen is negative, [place] screen may be positive although does not seem clearly to be so, clear exacerbation with situational stressors.  Very notably, patient reports that activities that she enjoys such as reading she is able to focus without any issue.  Her mini mental exam showed perfect immediate and delayed word recall, she was alert and responding appropriately throughout the interview.  Collectively, it seems much less likely to be ADHD as patient had wondered about and much more that situational stressors exacerbated by a busy lifestyle are making it more challenging to stay focused on activities she is not as interested in at times.    Plan  - Discussed possibility of referral for formal neuropsychiatric testing versus monitoring, patient prefers to monitor at present.  Offered to refer her in the future if she would like or discuss stress management strategies if she would like, patient states she will let me know if she would like to proceed with any of these options.    Breast pain, suspect related to a new bra, but needs further evaluation  -mostly at night, b/l breasts, no massess noticed, x 2 weeks, new bra around the same time, no nipple changes or discharge, no fever.  First time this is occurred.  No significant change with her cycle noted, although her cycles have been sporadic lately potentially related to a perimenopausal state.  On exam she does have a thickening/prominence on the right lateral aspect of the right nipple and an area where she is tender.  - Given that she has a ultrasound and mammogram in August of last year that is normal, has a clear inciting event (new bra), and is reporting pain, collectively seems less likely to be  malignancy.  However, does have dense breasts per last mammogram and ultrasound.  Does have a palpable mass.    Plan  -Diagnostic mammogram bilateral ordered  - Asked patient to please follow-up with her gynecologist for her routine management (normally gets mammograms and Paps through her gynecologist) and also after these results particularly if abnormal for next steps.  I will contact the patient in the interim with any abnormalities.  Appreciate gynecology support        From Prior Visit, Not Addressed Today:    For next visit      Hx thrombocytosis and mild nc anemia  -resoved on most recent cbc, anemial also resovled.  ____ sx.  Discussed possiblity of repeat, pt    Healthcare Maintenance    Colonoscopy: neg 2023, due 2033  HIV: neg 2022  Syphilis:neg 2022  Hep B:hep b SA neg 2022  Hep C:neg 2022 (outside records reviewed on patient's cell phone)  GC/chlam neg recently also, counseled on recheck only if new sexual contact with unknown status.  Offered to review safe sex practices if needed, patient reports no questions.  Diabetes: BG nl 2023  ASCVD: The 10-year ASCVD risk score (Rin DK, et al., 2019) is: 0.6%   no statin needed  Smoking: never  Vaping: no  Alcohol Use: no  Recreational Drugs: no  Healthy Eating/Exercise: previously extensively discussed current evidence-based dietary practices, reviewed vegetarian inclined diet recommendations, discussed glycemic index, healthy eating handout given.  Also answered some questions about dairy consumption based on recent research.  Discussed that in general if eating a balanced diet no supplements needed.  Depression and Anxiety: phq2 neg, situational stressors (moving, death of friend, buying new home) but states is coping okay, no SI/HI.  Did share anxiety resources handout with patient as patient was interested, offered to discuss further at any time.  Immunizations: utd    Mammogram: neg 2023, repeat pending per GYN.  Us breast neg 2024  PAP: prior abnl  but 5/2024 pap neg with neg hpv, followed by gyn, appreciate support, defer further management to GYN  DEXA: not yet  ADLs: no issue  Fall Risk: no issue  Goals of Care: full code, POLST form given.  Patient states that she is planning to meet with an  to review further..      Mild JESSIKA  -sx c/w jessika  -home study with AHI 6.8, thus likely mild JESSIKA.  Patient reports started CPAP  per sleep medicine.  -defer to sleep med for further management, appreciate support    ? Prior thyromegaly  -no thyroid symptoms, tsh nl 7/2023.  ? Prior L mild thyroid prominence on previous exam.  We did not have a chance to palpate her thyroid today given time was spend reviewing preventave topics and discussing diet - patient will return to clinic at her convenince for brief check in for thyroid re-palpation, if normal will follow clinically.    Addendum: patient returned for thyroid exam after the visit, no thyromegaly on repeat exam, continues with no thyroid symptoms, no further intervention needed, will follow clinically.      Possible Tight hip musculature causing intermittent pain  -no alarm sx. no focal issues on exam  -suspect  mild hip muscle tightness which may be residually contributing to her symptoms.  Hips appear to be in a neutral position.  Significantly improved from prior visit with resolution of piriformis syndrome, R psoas now normalized.    Plan  -Offered to repeat physical therapy with slightly different therapeutic approach for current symptoms, patient prefers to try home physical therapy.  Handout given for possible exercises that can help with hip mobilization.  Also gave recommendations for possible more intensive mobilization via Dr. Cedillo mobility book, to be used with caution  -patient reports going to a chiropractor, discussed mentioning possibility of discussion with him to see if can assist with hip mobility  -emphasized the importance of neutral alignment, including topics such as using both  straps on a backpack instead of just one  -patient will message to update on progress - if still no improvement with above could consider formal sports medicine/OMM referral.    B/l axillary swelling, possibly secondary to viral infection, improving  -resolved    seasonal allergies s/p immunotherapy as a child  -not directly addressed today, but patient reported doing well on Flonase during recent refill request.  Can follow clinically at future visit.      History of anemia, history of thrombocytosis  -Had a normocytic anemia and thrombocytosis many years ago on the labs that appears was not repeated, perhaps around the time of her mastitis biopsy.  We will repeat CBC to trend.  Asymptomatic today.  7/1 - cbc with diff nl, monitor clinically.    calvin allergy - mild, reports is able to tolerate small amounts without any symptoms at all, never had anaphylaxis.  Discussed possibility of carrying epipen, patient politely declines at present given mild nature of symptoms.  States has not had more significant symptoms for 20 years.  Will plan to evaluate more fully at future visit, cautioned any sob/throat swelling  or severe lightheadendes after eating to go to ED for evaluation.    Discussion on vitamins/supplements  -Patient reports taking cranberry pills, Azo supplements for urinary symptoms/prevention, and also questions the utility of a multivitamin.  Discussed that if she is eating a balanced diet there is no need for a multivitamin per current literature.  Discussed that Azo pills simply mask symptoms of urinary tract infection and do not treat or prevent them, and encouraged her to discontinue use.  Discussed that my understanding is that cranberry juice has not been shown to have a significant intervention in terms of reducing UTIs, and that hydration itself is more effective, although I will need to review this to see if any new or literature exists.      Return to clinic in 1 mo for annual exam/prior lab  annual exam due 6/2026, offered to see patient sooner if any issues arise.  Patient reports no other issues to discuss.

## 2025-07-03 ENCOUNTER — OFFICE VISIT (OUTPATIENT)
Dept: INTERNAL MEDICINE | Facility: OTHER | Age: 50
End: 2025-07-03
Payer: COMMERCIAL

## 2025-07-03 VITALS
HEIGHT: 63 IN | OXYGEN SATURATION: 98 % | WEIGHT: 136.4 LBS | TEMPERATURE: 98.1 F | SYSTOLIC BLOOD PRESSURE: 99 MMHG | HEART RATE: 81 BPM | BODY MASS INDEX: 24.17 KG/M2 | DIASTOLIC BLOOD PRESSURE: 60 MMHG

## 2025-07-03 DIAGNOSIS — Z00.00 ENCOUNTER FOR ANNUAL PHYSICAL EXAM: Primary | ICD-10-CM

## 2025-07-03 PROCEDURE — 3074F SYST BP LT 130 MM HG: CPT | Performed by: INTERNAL MEDICINE

## 2025-07-03 PROCEDURE — 1126F AMNT PAIN NOTED NONE PRSNT: CPT | Performed by: INTERNAL MEDICINE

## 2025-07-03 PROCEDURE — 3078F DIAST BP <80 MM HG: CPT | Performed by: INTERNAL MEDICINE

## 2025-07-03 PROCEDURE — 99396 PREV VISIT EST AGE 40-64: CPT | Performed by: INTERNAL MEDICINE

## 2025-07-03 ASSESSMENT — PAIN SCALES - GENERAL: PAINLEVEL_OUTOF10: NO PAIN

## 2025-07-03 NOTE — PROGRESS NOTES
"Subjective     Sofia Viramontes is a 46 y.o. 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    Please see assessment and plan                  ROS               Objective     BP 99/60 (BP Location: Left arm, Patient Position: Sitting, BP Cuff Size: Adult)   Pulse 81   Temp 36.7 °C (98.1 °F) (Temporal)   Ht 1.6 m (5' 3\")   Wt 61.9 kg (136 lb 6.4 oz)   SpO2 98%   Breastfeeding No   BMI 24.16 kg/m²      Physical Exam    General: Pleasant woman in no apparent distress, alert, responding appropriately.                       Assessment & Plan         Healthcare Maintenance    Colonoscopy: neg 2023, due 2033  HIV: neg 2022  Syphilis:neg 2022  Hep B:hep b SA neg 2022  Hep C:neg 2022 (outside records reviewed on patient's cell phone)  GC/chlam neg recently also, counseled on recheck only if new sexual contact with unknown status.  Offered to review safe sex practices if needed, patient reports no questions.  Patient reported no new infectious exposures today  Diabetes: BG nl 2023, A1c 5.2.  nl bmi.  Follow clinically  ASCVD: The 10-year ASCVD risk score (Rin DK, et al., 2019) is: 0.6%  prvent 0.5%.  no sx   no statin needed.  Ldl 102.    Smoking: never  Vaping: no  Alcohol Use: no  Recreational Drugs: no  Healthy Eating/Exercise: previously extensively discussed current evidence-based dietary practices, reviewed vegetarian inclined diet recommendations, discussed glycemic index, healthy eating handout given.  Also answered some questions about dairy consumption based on recent " research.  Discussed that in general if eating a balanced diet no supplements needed.  Today reports no issues with diet, also reports is exercising regularly - yoga, weight lifting.  Encouraged continuing, recommend minimum of 30 minutes of physical activity most days of the week.  Depression and Anxiety: phq2 neg today, prior situational stressors and  Did share anxiety resources handout with patient as patient was interested, offered to discuss further at any time.  Today states situational stressors are manageable, will follow clinically  Immunizations: utd, discussed pros and cons of flu shot and COVID vaccines and differences between the, could consider immunization with both in the fall if patient is interested although did review recommendations that we will likely change recommended COVID immunization groups this fall to not include her    Mammogram: neg 2023 [facility] breast neg 2024. No f, no change in sx since mammogram ordered last visit for breast pain.  Diagnostic mammogram pending,, will follow up with gyn also  PAP: prior abnl but 5/2024 pap neg with neg hpv, followed by gyn, appreciate support, defer further management to GYN  DEXA: not yet, does not offer clear risk factors  ADLs: no issue  Fall Risk: no issues  Goals of Care: full code from prior discussion, POLST form previously given.  Patient previously states that she is planning to meet with an  to review further..  Counseled slightly more today regarding goals of care, discussed the benefits for all adults to have a plan sure they not be able to express their wishes for healthcare (such as if they were incapacitated from a car accident).  Reviewed the patient fortunately is low risk in general for a health related event, but still is a good idea for all adults to consider this topic.    History of anemia, history of thrombocytosis  -Had a normocytic anemia and thrombocytosis many years ago on labs, perhaps around the time of her mastitis  biopsy.  Asymptomatic on prior assessment repeat cbc w diff nl.  Today: no s/s bleeding, no fatigue, no b sx, donastes blood regularly per report.  Discussed possibility of checking CBC 1 more time to ensure stability versus following clinically, patient elects to which is reasonable.    Low bp  - systolic 99, patient is a thin healthy woman  -no sx  -Suspect may have mild dehydration component also  -Encourage 3 L of fluid daily, patient reports drinking less than this currently.  Follow clinically    Supplement use  - Patient reports is on a vitamin supplement.  Counseled on the risks of non-FDA reviewed supplements, and recommended cessation.      From Prior Visit, Not Addressed Today:      Concentration issues likely secondary to busy life and situational stressors  -sleep makes worse, has chagnes last 2-3 years.  No rec drug or alcohool.  Some caffeine, did not seem to notice significant change with this.  Phq2 neg, notes multiple situational stressors including relatively new partner, childcare duties, potentially changes related to work., no si/hi.  Stress exacerbates the symptoms s. No lack of sleep, but some higher levels, mildly elevated during the daytime.  - Depression screen is negative, [place] screen may be positive although does not seem clearly to be so, clear exacerbation with situational stressors.  Very notably, patient reports that activities that she enjoys such as reading she is able to focus without any issue.  Her mini mental exam showed perfect immediate and delayed word recall, she was alert and responding appropriately throughout the interview.  Collectively, it seems much less likely to be ADHD as patient had wondered about and much more that situational stressors exacerbated by a busy lifestyle are making it more challenging to stay focused on activities she is not as interested in at times.    Plan  - Discussed possibility of referral for formal neuropsychiatric testing versus  monitoring, patient prefers to monitor at present.  Offered to refer her in the future if she would like or discuss stress management strategies if she would like, patient states she will let me know if she would like to proceed with any of these options.    Breast pain, suspect related to a new bra, but needs further evaluation  -mostly at night, b/l breasts, no massess noticed, x 2 weeks, new bra around the same time, no nipple changes or discharge, no fever.  First time this is occurred.  No significant change with her cycle noted, although her cycles have been sporadic lately potentially related to a perimenopausal state.  On exam she does have a thickening/prominence on the right lateral aspect of the right nipple and an area where she is tender.  - Given that she has a ultrasound and mammogram in August of last year that is normal, has a clear inciting event (new bra), and is reporting pain, collectively seems less likely to be malignancy.  However, does have dense breasts per last mammogram and ultrasound.  Does have a palpable mass.    Plan  -Diagnostic mammogram bilateral ordered  - Asked patient to please follow-up with her gynecologist for her routine management (normally gets mammograms and Paps through her gynecologist) and also after these results particularly if abnormal for next steps.  I will contact the patient in the interim with any abnormalities.  Appreciate gynecology support              Mild JESSIKA  -sx c/w jessika  -home study with AHI 6.8, thus likely mild JESSIKA.  Patient reports started CPAP  per sleep medicine.  -defer to sleep med for further management, appreciate support    ? Prior thyromegaly  -no thyroid symptoms, tsh nl 7/2023.  ? Prior L mild thyroid prominence on previous exam.  We did not have a chance to palpate her thyroid today given time was spend reviewing preventave topics and discussing diet - patient will return to clinic at her convenince for brief check in for thyroid  re-palpation, if normal will follow clinically.    Addendum: patient returned for thyroid exam after the visit, no thyromegaly on repeat exam, continues with no thyroid symptoms, no further intervention needed, will follow clinically.      Possible Tight hip musculature causing intermittent pain  -no alarm sx. no focal issues on exam  -suspect  mild hip muscle tightness which may be residually contributing to her symptoms.  Hips appear to be in a neutral position.  Significantly improved from prior visit with resolution of piriformis syndrome, R psoas now normalized.    Plan  -Offered to repeat physical therapy with slightly different therapeutic approach for current symptoms, patient prefers to try home physical therapy.  Handout given for possible exercises that can help with hip mobilization.  Also gave recommendations for possible more intensive mobilization via Dr. Cedillo mobility book, to be used with caution  -patient reports going to a chiropractor, discussed mentioning possibility of discussion with him to see if can assist with hip mobility  -emphasized the importance of neutral alignment, including topics such as using both straps on a backpack instead of just one  -patient will message to update on progress - if still no improvement with above could consider formal sports medicine/OM[facility] referral.    B/l axillary swelling, possibly secondary to viral infection, improving  -resolved    seasonal allergies s/p immunotherapy as a child  -not directly addressed today, but patient reported doing well on Flonase during recent refill request.  Can follow clinically at future visit.          calvin allergy - mild, reports is able to tolerate small amounts without any symptoms at all, never had anaphylaxis.  Discussed possibility of carrying epipen, patient politely declines at present given mild nature of symptoms.  States has not had more significant symptoms for 20 years.  Will plan to evaluate more fully at  future visit, cautioned any sob/throat swelling  or severe lightheadendes after eating to go to ED for evaluation.    Discussion on vitamins/supplements  -Patient reports taking cranberry pills, Azo supplements for urinary symptoms/prevention, and also questions the utility of a multivitamin.  Discussed that if she is eating a balanced diet there is no need for a multivitamin per current literature.  Discussed that Azo pills simply mask symptoms of urinary tract infection and do not treat or prevent them, and encouraged her to discontinue use.  Discussed that my understanding is that cranberry juice has not been shown to have a significant intervention in terms of reducing UTIs, and that hydration itself is more effective, although I will need to review this to see if any new or literature exists.      Return to clinic prn.  annual exam due 7/2026, offered to see patient sooner if any issues arise.  Patient reports no other issues to discuss.

## 2025-08-12 ENCOUNTER — HOSPITAL ENCOUNTER (OUTPATIENT)
Facility: MEDICAL CENTER | Age: 50
End: 2025-08-12
Attending: INTERNAL MEDICINE
Payer: COMMERCIAL

## 2025-08-12 ENCOUNTER — HOSPITAL ENCOUNTER (OUTPATIENT)
Dept: RADIOLOGY | Facility: MEDICAL CENTER | Age: 50
End: 2025-08-12

## 2025-08-12 VITALS — WEIGHT: 131 LBS | BODY MASS INDEX: 23.21 KG/M2 | HEIGHT: 63 IN

## 2025-08-12 DIAGNOSIS — N64.4 BREAST PAIN: ICD-10-CM

## 2025-08-12 DIAGNOSIS — N63.10 MASS OF RIGHT BREAST, UNSPECIFIED QUADRANT: ICD-10-CM

## 2025-08-12 PROCEDURE — G0279 TOMOSYNTHESIS, MAMMO: HCPCS

## 2025-08-27 DIAGNOSIS — Z00.6 CLINICAL TRIAL PARTICIPANT: ICD-10-CM

## 2025-09-22 ENCOUNTER — APPOINTMENT (OUTPATIENT)
Dept: LAB | Facility: MEDICAL CENTER | Age: 50
End: 2025-09-22
Attending: FAMILY MEDICINE
Payer: COMMERCIAL

## (undated) DEVICE — KIT  I.V. START (100EA/CA)

## (undated) DEVICE — NEPTUNE 4 PORT MANIFOLD - (20/PK)

## (undated) DEVICE — SPONGE GAUZESTER 4 X 4 4PLY - (128PK/CA)

## (undated) DEVICE — KIT ANESTHESIA W/CIRCUIT & 3/LT BAG W/FILTER (20EA/CA)

## (undated) DEVICE — LACTATED RINGERS INJ 1000 ML - (14EA/CA 60CA/PF)

## (undated) DEVICE — CANISTER SUCTION 3000ML MECHANICAL FILTER AUTO SHUTOFF MEDI-VAC NONSTERILE LF DISP  (40EA/CA)

## (undated) DEVICE — SWAB ANAEROBIC SPEC.COLLECTOR - (25/PK 4PK/CA 100EA/CA)

## (undated) DEVICE — CATHETER IV 20 GA X 1-1/4 ---SURG.& SDS ONLY--- (50EA/BX)

## (undated) DEVICE — PACK MINOR BASIN - (2EA/CA)

## (undated) DEVICE — SENSOR SPO2 NEO LNCS ADHESIVE (20/BX) SEE USER NOTES

## (undated) DEVICE — TUBING CLEARLINK DUO-VENT - C-FLO (48EA/CA)

## (undated) DEVICE — SUCTION INSTRUMENT YANKAUER BULBOUS TIP W/O VENT (50EA/CA)

## (undated) DEVICE — ELECTRODE 850 FOAM ADHESIVE - HYDROGEL RADIOTRNSPRNT (50/PK)

## (undated) DEVICE — SUTURE GENERAL

## (undated) DEVICE — STERI STRIP COMPOUND BENZOIN - TINCTURE 0.6ML WITH APPLICATOR (40EA/BX)

## (undated) DEVICE — SODIUM CHL IRRIGATION 0.9% 1000ML (12EA/CA)

## (undated) DEVICE — ELECTRODE DUAL RETURN W/ CORD - (50/PK)

## (undated) DEVICE — CANISTER SUCTION RIGID RED 1500CC (40EA/CA)

## (undated) DEVICE — SET LEADWIRE 5 LEAD BEDSIDE DISPOSABLE ECG (1SET OF 5/EA)

## (undated) DEVICE — TUBE CONNECTING SUCTION - CLEAR PLASTIC STERILE 72 IN (50EA/CA)

## (undated) DEVICE — GLOVE BIOGEL SZ 6.5 SURGICAL PF LTX (50PR/BX 4BX/CA)

## (undated) DEVICE — MASK ANESTHESIA ADULT  - (100/CA)

## (undated) DEVICE — TUBE, CULTURE AEROBIC

## (undated) DEVICE — CHLORAPREP 26 ML APPLICATOR - ORANGE TINT(25/CA)

## (undated) DEVICE — HEAD HOLDER JUNIOR/ADULT

## (undated) DEVICE — PROTECTOR ULNA NERVE - (36PR/CA)

## (undated) DEVICE — DRAPE LAPAROTOMY T SHEET - (12EA/CA)

## (undated) DEVICE — GLOVE BIOGEL SZ 8.5 SURGICAL PF LTX - (50PR/BX 4BX/CA)